# Patient Record
Sex: MALE | Race: WHITE | NOT HISPANIC OR LATINO | Employment: FULL TIME | URBAN - METROPOLITAN AREA
[De-identification: names, ages, dates, MRNs, and addresses within clinical notes are randomized per-mention and may not be internally consistent; named-entity substitution may affect disease eponyms.]

---

## 2019-10-29 ENCOUNTER — HOSPITAL ENCOUNTER (EMERGENCY)
Facility: HOSPITAL | Age: 34
Discharge: HOME | End: 2019-10-29
Attending: EMERGENCY MEDICINE | Admitting: EMERGENCY MEDICINE

## 2019-10-29 VITALS
WEIGHT: 155 LBS | DIASTOLIC BLOOD PRESSURE: 94 MMHG | RESPIRATION RATE: 16 BRPM | HEART RATE: 135 BPM | OXYGEN SATURATION: 96 % | HEIGHT: 69 IN | BODY MASS INDEX: 22.96 KG/M2 | TEMPERATURE: 97.8 F | SYSTOLIC BLOOD PRESSURE: 128 MMHG

## 2019-10-29 DIAGNOSIS — G56.31 SATURDAY NIGHT PARALYSIS OF RIGHT UPPER EXTREMITY: Primary | ICD-10-CM

## 2019-10-29 LAB
ALBUMIN SERPL-MCNC: 4.7 G/DL (ref 3.5–5.2)
ALBUMIN/GLOB SERPL: 1.6 G/DL
ALP SERPL-CCNC: 66 U/L (ref 39–117)
ALT SERPL W P-5'-P-CCNC: 27 U/L (ref 1–41)
ANION GAP SERPL CALCULATED.3IONS-SCNC: 13.9 MMOL/L (ref 5–15)
AST SERPL-CCNC: 24 U/L (ref 1–40)
BASOPHILS # BLD AUTO: 0.06 10*3/MM3 (ref 0–0.2)
BASOPHILS NFR BLD AUTO: 1.2 % (ref 0–1.5)
BILIRUB SERPL-MCNC: 0.7 MG/DL (ref 0.2–1.2)
BUN BLD-MCNC: 9 MG/DL (ref 6–20)
BUN/CREAT SERPL: 10 (ref 7–25)
CALCIUM SPEC-SCNC: 10.5 MG/DL (ref 8.6–10.5)
CHLORIDE SERPL-SCNC: 98 MMOL/L (ref 98–107)
CK SERPL-CCNC: 165 U/L (ref 20–200)
CO2 SERPL-SCNC: 26.1 MMOL/L (ref 22–29)
CREAT BLD-MCNC: 0.9 MG/DL (ref 0.76–1.27)
DEPRECATED RDW RBC AUTO: 47.2 FL (ref 37–54)
EOSINOPHIL # BLD AUTO: 0.05 10*3/MM3 (ref 0–0.4)
EOSINOPHIL NFR BLD AUTO: 1 % (ref 0.3–6.2)
ERYTHROCYTE [DISTWIDTH] IN BLOOD BY AUTOMATED COUNT: 13.9 % (ref 12.3–15.4)
GFR SERPL CREATININE-BSD FRML MDRD: 97 ML/MIN/1.73
GLOBULIN UR ELPH-MCNC: 3 GM/DL
GLUCOSE BLD-MCNC: 103 MG/DL (ref 65–99)
HCT VFR BLD AUTO: 45.2 % (ref 37.5–51)
HGB BLD-MCNC: 15.5 G/DL (ref 13–17.7)
IMM GRANULOCYTES # BLD AUTO: 0.02 10*3/MM3 (ref 0–0.05)
IMM GRANULOCYTES NFR BLD AUTO: 0.4 % (ref 0–0.5)
LYMPHOCYTES # BLD AUTO: 2.04 10*3/MM3 (ref 0.7–3.1)
LYMPHOCYTES NFR BLD AUTO: 39.5 % (ref 19.6–45.3)
MCH RBC QN AUTO: 31.6 PG (ref 26.6–33)
MCHC RBC AUTO-ENTMCNC: 34.3 G/DL (ref 31.5–35.7)
MCV RBC AUTO: 92.2 FL (ref 79–97)
MONOCYTES # BLD AUTO: 0.49 10*3/MM3 (ref 0.1–0.9)
MONOCYTES NFR BLD AUTO: 9.5 % (ref 5–12)
NEUTROPHILS # BLD AUTO: 2.51 10*3/MM3 (ref 1.7–7)
NEUTROPHILS NFR BLD AUTO: 48.4 % (ref 42.7–76)
NRBC BLD AUTO-RTO: 0 /100 WBC (ref 0–0.2)
PLATELET # BLD AUTO: 260 10*3/MM3 (ref 140–450)
PMV BLD AUTO: 9.2 FL (ref 6–12)
POTASSIUM BLD-SCNC: 4.1 MMOL/L (ref 3.5–5.2)
PROT SERPL-MCNC: 7.7 G/DL (ref 6–8.5)
RBC # BLD AUTO: 4.9 10*6/MM3 (ref 4.14–5.8)
SODIUM BLD-SCNC: 138 MMOL/L (ref 136–145)
WBC NRBC COR # BLD: 5.17 10*3/MM3 (ref 3.4–10.8)

## 2019-10-29 PROCEDURE — 82550 ASSAY OF CK (CPK): CPT | Performed by: EMERGENCY MEDICINE

## 2019-10-29 PROCEDURE — 99283 EMERGENCY DEPT VISIT LOW MDM: CPT

## 2019-10-29 PROCEDURE — 85025 COMPLETE CBC W/AUTO DIFF WBC: CPT | Performed by: EMERGENCY MEDICINE

## 2019-10-29 PROCEDURE — 80053 COMPREHEN METABOLIC PANEL: CPT | Performed by: EMERGENCY MEDICINE

## 2019-10-29 RX ORDER — MIRTAZAPINE 15 MG/1
15 TABLET, FILM COATED ORAL NIGHTLY
COMMUNITY

## 2020-07-14 ENCOUNTER — TELEPHONE (OUTPATIENT)
Dept: URGENT CARE | Facility: CLINIC | Age: 35
End: 2020-07-14

## 2020-07-14 NOTE — TELEPHONE ENCOUNTER
Pt advised of negative COVID results and current CDC quarantine guidelines.  Voices understanding.    ----- Message from Hernando Naidu MD sent at 7/14/2020  7:37 AM EDT -----  Neg test

## 2021-04-23 ENCOUNTER — HOSPITAL ENCOUNTER (EMERGENCY)
Facility: HOSPITAL | Age: 36
Discharge: HOME | End: 2021-04-23
Attending: EMERGENCY MEDICINE | Admitting: EMERGENCY MEDICINE

## 2021-04-23 VITALS
TEMPERATURE: 97.6 F | DIASTOLIC BLOOD PRESSURE: 92 MMHG | OXYGEN SATURATION: 97 % | HEIGHT: 69 IN | BODY MASS INDEX: 21.92 KG/M2 | WEIGHT: 148 LBS | SYSTOLIC BLOOD PRESSURE: 120 MMHG | RESPIRATION RATE: 18 BRPM | HEART RATE: 99 BPM

## 2021-04-23 DIAGNOSIS — R11.2 INTRACTABLE VOMITING WITH NAUSEA, UNSPECIFIED VOMITING TYPE: Primary | ICD-10-CM

## 2021-04-23 DIAGNOSIS — E87.6 HYPOKALEMIA: ICD-10-CM

## 2021-04-23 LAB
ALBUMIN SERPL-MCNC: 5 G/DL (ref 3.5–5.2)
ALBUMIN/GLOB SERPL: 1.8 G/DL
ALP SERPL-CCNC: 90 U/L (ref 39–117)
ALT SERPL W P-5'-P-CCNC: 19 U/L (ref 1–41)
ANION GAP SERPL CALCULATED.3IONS-SCNC: 17.1 MMOL/L (ref 5–15)
AST SERPL-CCNC: 17 U/L (ref 1–40)
BASOPHILS # BLD AUTO: 0.05 10*3/MM3 (ref 0–0.2)
BASOPHILS NFR BLD AUTO: 0.4 % (ref 0–1.5)
BILIRUB SERPL-MCNC: 1.2 MG/DL (ref 0–1.2)
BUN SERPL-MCNC: 9 MG/DL (ref 6–20)
BUN/CREAT SERPL: 10.7 (ref 7–25)
CALCIUM SPEC-SCNC: 9.6 MG/DL (ref 8.6–10.5)
CHLORIDE SERPL-SCNC: 103 MMOL/L (ref 98–107)
CO2 SERPL-SCNC: 24.9 MMOL/L (ref 22–29)
CREAT SERPL-MCNC: 0.84 MG/DL (ref 0.76–1.27)
DEPRECATED RDW RBC AUTO: 44.5 FL (ref 37–54)
EOSINOPHIL # BLD AUTO: 0.06 10*3/MM3 (ref 0–0.4)
EOSINOPHIL NFR BLD AUTO: 0.5 % (ref 0.3–6.2)
ERYTHROCYTE [DISTWIDTH] IN BLOOD BY AUTOMATED COUNT: 13.3 % (ref 12.3–15.4)
GFR SERPL CREATININE-BSD FRML MDRD: 105 ML/MIN/1.73
GLOBULIN UR ELPH-MCNC: 2.8 GM/DL
GLUCOSE SERPL-MCNC: 72 MG/DL (ref 65–99)
HCT VFR BLD AUTO: 50 % (ref 37.5–51)
HGB BLD-MCNC: 17.1 G/DL (ref 13–17.7)
IMM GRANULOCYTES # BLD AUTO: 0.1 10*3/MM3 (ref 0–0.05)
IMM GRANULOCYTES NFR BLD AUTO: 0.8 % (ref 0–0.5)
LYMPHOCYTES # BLD AUTO: 1.42 10*3/MM3 (ref 0.7–3.1)
LYMPHOCYTES NFR BLD AUTO: 10.9 % (ref 19.6–45.3)
MAGNESIUM SERPL-MCNC: 2 MG/DL (ref 1.6–2.6)
MCH RBC QN AUTO: 31.6 PG (ref 26.6–33)
MCHC RBC AUTO-ENTMCNC: 34.2 G/DL (ref 31.5–35.7)
MCV RBC AUTO: 92.4 FL (ref 79–97)
MONOCYTES # BLD AUTO: 0.5 10*3/MM3 (ref 0.1–0.9)
MONOCYTES NFR BLD AUTO: 3.9 % (ref 5–12)
NEUTROPHILS NFR BLD AUTO: 10.85 10*3/MM3 (ref 1.7–7)
NEUTROPHILS NFR BLD AUTO: 83.5 % (ref 42.7–76)
NRBC BLD AUTO-RTO: 0 /100 WBC (ref 0–0.2)
PLATELET # BLD AUTO: 249 10*3/MM3 (ref 140–450)
PMV BLD AUTO: 9.7 FL (ref 6–12)
POTASSIUM SERPL-SCNC: 3.3 MMOL/L (ref 3.5–5.2)
PROT SERPL-MCNC: 7.8 G/DL (ref 6–8.5)
RBC # BLD AUTO: 5.41 10*6/MM3 (ref 4.14–5.8)
SODIUM SERPL-SCNC: 145 MMOL/L (ref 136–145)
WBC # BLD AUTO: 12.98 10*3/MM3 (ref 3.4–10.8)

## 2021-04-23 PROCEDURE — 99283 EMERGENCY DEPT VISIT LOW MDM: CPT

## 2021-04-23 PROCEDURE — 85025 COMPLETE CBC W/AUTO DIFF WBC: CPT | Performed by: EMERGENCY MEDICINE

## 2021-04-23 PROCEDURE — 80053 COMPREHEN METABOLIC PANEL: CPT | Performed by: EMERGENCY MEDICINE

## 2021-04-23 PROCEDURE — 96374 THER/PROPH/DIAG INJ IV PUSH: CPT

## 2021-04-23 PROCEDURE — 83735 ASSAY OF MAGNESIUM: CPT | Performed by: EMERGENCY MEDICINE

## 2021-04-23 PROCEDURE — 25010000002 DIPHENHYDRAMINE PER 50 MG: Performed by: EMERGENCY MEDICINE

## 2021-04-23 RX ORDER — ONDANSETRON 4 MG/1
4 TABLET, ORALLY DISINTEGRATING ORAL 4 TIMES DAILY PRN
Qty: 12 TABLET | Refills: 0 | Status: SHIPPED | OUTPATIENT
Start: 2021-04-23 | End: 2022-02-09 | Stop reason: SDUPTHER

## 2021-04-23 RX ORDER — PRAMOXINE HYDROCHLORIDE 10 MG/G
AEROSOL, FOAM TOPICAL
COMMUNITY

## 2021-04-23 RX ORDER — SODIUM CHLORIDE 0.9 % (FLUSH) 0.9 %
10 SYRINGE (ML) INJECTION AS NEEDED
Status: DISCONTINUED | OUTPATIENT
Start: 2021-04-23 | End: 2021-04-23 | Stop reason: HOSPADM

## 2021-04-23 RX ORDER — DIPHENHYDRAMINE HYDROCHLORIDE 50 MG/ML
25 INJECTION INTRAMUSCULAR; INTRAVENOUS ONCE
Status: COMPLETED | OUTPATIENT
Start: 2021-04-23 | End: 2021-04-23

## 2021-04-23 RX ORDER — POTASSIUM CHLORIDE 750 MG/1
40 TABLET, FILM COATED, EXTENDED RELEASE ORAL AS NEEDED
Status: DISCONTINUED | OUTPATIENT
Start: 2021-04-23 | End: 2021-04-23 | Stop reason: HOSPADM

## 2021-04-23 RX ORDER — POTASSIUM CHLORIDE 20 MEQ/1
20 TABLET, EXTENDED RELEASE ORAL DAILY
Qty: 3 TABLET | Refills: 0 | Status: ON HOLD | OUTPATIENT
Start: 2021-04-23 | End: 2022-04-25

## 2021-04-23 RX ORDER — CYCLOBENZAPRINE HCL 10 MG
10 TABLET ORAL 3 TIMES DAILY PRN
COMMUNITY

## 2021-04-23 RX ADMIN — POTASSIUM CHLORIDE 40 MEQ: 750 TABLET, EXTENDED RELEASE ORAL at 19:26

## 2021-04-23 RX ADMIN — SODIUM CHLORIDE, POTASSIUM CHLORIDE, SODIUM LACTATE AND CALCIUM CHLORIDE 1000 ML: 600; 310; 30; 20 INJECTION, SOLUTION INTRAVENOUS at 18:21

## 2021-04-23 RX ADMIN — DIPHENHYDRAMINE HYDROCHLORIDE 25 MG: 50 INJECTION, SOLUTION INTRAMUSCULAR; INTRAVENOUS at 18:23

## 2021-04-23 NOTE — ED TRIAGE NOTES
Pt had COVID vaccine 2 days ago states today vomiting, diaphoretic. Pt aving jerking movements in triage.     Patient masked in first look triage. I was wearing mask and goggles.

## 2021-04-23 NOTE — ED PROVIDER NOTES
EMERGENCY DEPARTMENT ENCOUNTER    Room Number:  34/34  Date of encounter:  4/23/2021  PCP: System, Provider Not In  Patient Care Team:  System, Provider Not In as PCP - General   Historian: Patient    HPI:  Chief Complaint: Vomiting, twitching      Context: Mane Gunderson is a 35 y.o. male who presents to the ED c/o vomiting and twitching.  He reports that 2 days ago he received his Pfizer shot.  Yesterday about 24 hours later he started vomiting.  He reports he vomited greater than 10 times.  He reports 6 hours after that he started having twitching and somewhat involuntary movements.  He went to urgent care today who gave him Zofran ODT which seemed to help the nausea.  He reports he has only vomited twice since then.  He reports that the twitching continues.  He denies prior similar episodes.  He denies abdominal pain.    Prior record review: ER visit 10/29/2019 for Saturday night palsy.    PAST MEDICAL HISTORY  Active Ambulatory Problems     Diagnosis Date Noted   • Appendicitis 08/10/2016   • Ruptured appendicitis 08/14/2016     Resolved Ambulatory Problems     Diagnosis Date Noted   • No Resolved Ambulatory Problems     Past Medical History:   Diagnosis Date   • ADD (attention deficit disorder)    • Dyslexia    • Pancreatitis    • Posttraumatic stress disorder          PAST SURGICAL HISTORY  Past Surgical History:   Procedure Laterality Date   • APPENDECTOMY N/A 8/10/2016    Procedure: APPENDECTOMY LAPAROSCOPIC;  Surgeon: Bird Vazquez Jr., MD;  Location: [facility];  Service:          FAMILY HISTORY  History reviewed. No pertinent family history.      SOCIAL HISTORY  Social History     Socioeconomic History   • Marital status: Single     Spouse name: Not on file   • Number of children: Not on file   • Years of education: Not on file   • Highest education level: Not on file   Tobacco Use   • Smoking status: Current Every Day Smoker     Packs/day: 1.00     Types: Electronic Cigarette   • Smokeless  tobacco: Never Used   Vaping Use   • Vaping Use: Every day   Substance and Sexual Activity   • Alcohol use: Yes   • Drug use: No   • Sexual activity: Defer         ALLERGIES  Haldol [haloperidol lactate] and Codeine        REVIEW OF SYSTEMS  Review of Systems   No abdominal pain, no diarrhea, no constipation, positive nausea, positive vomiting, negative shortness of breath, negative focal weakness or numbness  All systems reviewed and negative except for those discussed in HPI.       PHYSICAL EXAM    I have reviewed the triage vital signs and nursing notes.    ED Triage Vitals [04/23/21 1748]   Temp Heart Rate Resp BP SpO2   97.6 °F (36.4 °C) 99 18 125/82 97 %      Temp src Heart Rate Source Patient Position BP Location FiO2 (%)   Tympanic -- -- -- --       Physical Exam  GENERAL: Awake, alert, no acute distress  SKIN: Warm, dry  HENT: Normocephalic, atraumatic  EYES: no scleral icterus  CV: regular rhythm, regular rate  RESPIRATORY: normal effort, lungs clear  ABDOMEN: soft, nontender, nondistended  MUSCULOSKELETAL: no deformity, some involuntary twitching of the upper extremities and head.  No twitching of the cheek with tapping on the cheek.  NEURO: alert, moves all extremities, follows commands          LAB RESULTS  Recent Results (from the past 24 hour(s))   Comprehensive Metabolic Panel    Collection Time: 04/23/21  6:21 PM    Specimen: Blood   Result Value Ref Range    Glucose 72 65 - 99 mg/dL    BUN 9 6 - 20 mg/dL    Creatinine 0.84 0.76 - 1.27 mg/dL    Sodium 145 136 - 145 mmol/L    Potassium 3.3 (L) 3.5 - 5.2 mmol/L    Chloride 103 98 - 107 mmol/L    CO2 24.9 22.0 - 29.0 mmol/L    Calcium 9.6 8.6 - 10.5 mg/dL    Total Protein 7.8 6.0 - 8.5 g/dL    Albumin 5.00 3.50 - 5.20 g/dL    ALT (SGPT) 19 1 - 41 U/L    AST (SGOT) 17 1 - 40 U/L    Alkaline Phosphatase 90 39 - 117 U/L    Total Bilirubin 1.2 0.0 - 1.2 mg/dL    eGFR Non African Amer 105 >60 mL/min/1.73    Globulin 2.8 gm/dL    A/G Ratio 1.8 g/dL     BUN/Creatinine Ratio 10.7 7.0 - 25.0    Anion Gap 17.1 (H) 5.0 - 15.0 mmol/L   Magnesium    Collection Time: 04/23/21  6:21 PM    Specimen: Blood   Result Value Ref Range    Magnesium 2.0 1.6 - 2.6 mg/dL   CBC Auto Differential    Collection Time: 04/23/21  6:21 PM    Specimen: Blood   Result Value Ref Range    WBC 12.98 (H) 3.40 - 10.80 10*3/mm3    RBC 5.41 4.14 - 5.80 10*6/mm3    Hemoglobin 17.1 13.0 - 17.7 g/dL    Hematocrit 50.0 37.5 - 51.0 %    MCV 92.4 79.0 - 97.0 fL    MCH 31.6 26.6 - 33.0 pg    MCHC 34.2 31.5 - 35.7 g/dL    RDW 13.3 12.3 - 15.4 %    RDW-SD 44.5 37.0 - 54.0 fl    MPV 9.7 6.0 - 12.0 fL    Platelets 249 140 - 450 10*3/mm3    Neutrophil % 83.5 (H) 42.7 - 76.0 %    Lymphocyte % 10.9 (L) 19.6 - 45.3 %    Monocyte % 3.9 (L) 5.0 - 12.0 %    Eosinophil % 0.5 0.3 - 6.2 %    Basophil % 0.4 0.0 - 1.5 %    Immature Grans % 0.8 (H) 0.0 - 0.5 %    Neutrophils, Absolute 10.85 (H) 1.70 - 7.00 10*3/mm3    Lymphocytes, Absolute 1.42 0.70 - 3.10 10*3/mm3    Monocytes, Absolute 0.50 0.10 - 0.90 10*3/mm3    Eosinophils, Absolute 0.06 0.00 - 0.40 10*3/mm3    Basophils, Absolute 0.05 0.00 - 0.20 10*3/mm3    Immature Grans, Absolute 0.10 (H) 0.00 - 0.05 10*3/mm3    nRBC 0.0 0.0 - 0.2 /100 WBC       Ordered the above labs and independently reviewed the results.        RADIOLOGY  No Radiology Exams Resulted Within Past 24 Hours    I ordered the above noted radiological studies. Reviewed by me and discussed with radiologist.  See dictation for official radiology interpretation.      PROCEDURES    Procedures      MEDICATIONS GIVEN IN ER    Medications   sodium chloride 0.9 % flush 10 mL (has no administration in time range)   potassium chloride (K-DUR,KLOR-CON) ER tablet 40 mEq (40 mEq Oral Given 4/23/21 1926)   lactated ringers bolus 1,000 mL (1,000 mL Intravenous New Bag 4/23/21 1821)   diphenhydrAMINE (BENADRYL) injection 25 mg (25 mg Intravenous Given 4/23/21 1823)         PROGRESS, DATA ANALYSIS, CONSULTS, AND  MEDICAL DECISION MAKING    All labs have been independently reviewed by me.  All radiology studies have been reviewed by me and discussed with radiologist dictating the report.   EKG's independently viewed and interpreted by me.  Discussion below represents my analysis of pertinent findings related to patient's condition, differential diagnosis, treatment plan and final disposition.        ED Course as of Apr 23 1927 Fri Apr 23, 2021 1816 Plan to check his electrolytes, provide IV hydration.  Giving IV Benadryl in case this is a vaccine reaction.    [TR]   1916 Potassium slightly low.   Potassium(!): 3.3 [TR]   1920 He reports his nausea and vomiting has resolved.  Ordering potassium to replace his low potassium.  His twitching has improved significantly.  He is asking for oral fluids.    [TR]      ED Course User Index  [TR] Jacoby Leahy MD           PPE: Both the patient and I wore a surgical mask throughout the entire patient encounter. I wore protective goggles.       AS OF 19:27 EDT VITALS:    BP - 120/92  HR - 99  TEMP - 97.6 °F (36.4 °C) (Tympanic)  O2 SATS - 97%        DIAGNOSIS  Final diagnoses:   Intractable vomiting with nausea, unspecified vomiting type   Hypokalemia         DISPOSITION  ED Disposition     ED Disposition Condition Comment    Discharge Stable                 Jacoby Leahy MD  04/23/21 1927

## 2021-04-23 NOTE — ED NOTES
Patient was placed in face mask in first look.  Patient was wearing a face mask throughout our encounter.  I wore protective eye protection throughout the encounter.  Hand hygiene was performed before and after patient encounter.        Thuy Coronel RN  04/23/21 5027

## 2021-04-24 NOTE — ED NOTES
Pt ambulatory c steady gait, AAOx4, ABC's intact, NAD noted at this time. Pt discharged c belongings and educational packet. PPE worn by this RN c pt wearing mask during encounters.      Reid Chen, DANIELLA  04/23/21 2000

## 2021-09-10 ENCOUNTER — APPOINTMENT (OUTPATIENT)
Dept: CT IMAGING | Facility: HOSPITAL | Age: 36
End: 2021-09-10

## 2021-09-10 ENCOUNTER — HOSPITAL ENCOUNTER (EMERGENCY)
Facility: HOSPITAL | Age: 36
Discharge: HOME | End: 2021-09-10
Attending: EMERGENCY MEDICINE | Admitting: EMERGENCY MEDICINE

## 2021-09-10 VITALS
HEIGHT: 69 IN | RESPIRATION RATE: 16 BRPM | WEIGHT: 148 LBS | SYSTOLIC BLOOD PRESSURE: 134 MMHG | BODY MASS INDEX: 21.92 KG/M2 | OXYGEN SATURATION: 97 % | DIASTOLIC BLOOD PRESSURE: 91 MMHG | HEART RATE: 95 BPM | TEMPERATURE: 98.4 F

## 2021-09-10 DIAGNOSIS — K85.20 ALCOHOL-INDUCED ACUTE PANCREATITIS, UNSPECIFIED COMPLICATION STATUS: ICD-10-CM

## 2021-09-10 DIAGNOSIS — F10.10 ALCOHOL ABUSE: Primary | ICD-10-CM

## 2021-09-10 LAB
ALBUMIN SERPL-MCNC: 5.8 G/DL (ref 3.5–5.2)
ALBUMIN/GLOB SERPL: 2.1 G/DL
ALP SERPL-CCNC: 82 U/L (ref 39–117)
ALT SERPL W P-5'-P-CCNC: 43 U/L (ref 1–41)
ANION GAP SERPL CALCULATED.3IONS-SCNC: 14.2 MMOL/L (ref 5–15)
AST SERPL-CCNC: 46 U/L (ref 1–40)
BASOPHILS # BLD AUTO: 0.08 10*3/MM3 (ref 0–0.2)
BASOPHILS NFR BLD AUTO: 0.9 % (ref 0–1.5)
BILIRUB SERPL-MCNC: 1.7 MG/DL (ref 0–1.2)
BILIRUB UR QL STRIP: NEGATIVE
BUN SERPL-MCNC: 9 MG/DL (ref 6–20)
BUN/CREAT SERPL: 10.3 (ref 7–25)
CALCIUM SPEC-SCNC: 10.2 MG/DL (ref 8.6–10.5)
CHLORIDE SERPL-SCNC: 97 MMOL/L (ref 98–107)
CLARITY UR: CLEAR
CO2 SERPL-SCNC: 24.8 MMOL/L (ref 22–29)
COLOR UR: YELLOW
CREAT SERPL-MCNC: 0.87 MG/DL (ref 0.76–1.27)
DEPRECATED RDW RBC AUTO: 45.8 FL (ref 37–54)
EOSINOPHIL # BLD AUTO: 0.32 10*3/MM3 (ref 0–0.4)
EOSINOPHIL NFR BLD AUTO: 3.7 % (ref 0.3–6.2)
ERYTHROCYTE [DISTWIDTH] IN BLOOD BY AUTOMATED COUNT: 13.4 % (ref 12.3–15.4)
GFR SERPL CREATININE-BSD FRML MDRD: 100 ML/MIN/1.73
GLOBULIN UR ELPH-MCNC: 2.8 GM/DL
GLUCOSE SERPL-MCNC: 119 MG/DL (ref 65–99)
GLUCOSE UR STRIP-MCNC: NEGATIVE MG/DL
HCT VFR BLD AUTO: 47.7 % (ref 37.5–51)
HGB BLD-MCNC: 16.6 G/DL (ref 13–17.7)
HGB UR QL STRIP.AUTO: NEGATIVE
HOLD SPECIMEN: NORMAL
HOLD SPECIMEN: NORMAL
IMM GRANULOCYTES # BLD AUTO: 0.04 10*3/MM3 (ref 0–0.05)
IMM GRANULOCYTES NFR BLD AUTO: 0.5 % (ref 0–0.5)
INR PPP: 0.96 (ref 0.9–1.1)
KETONES UR QL STRIP: NEGATIVE
LEUKOCYTE ESTERASE UR QL STRIP.AUTO: NEGATIVE
LIPASE SERPL-CCNC: 136 U/L (ref 13–60)
LYMPHOCYTES # BLD AUTO: 2.6 10*3/MM3 (ref 0.7–3.1)
LYMPHOCYTES NFR BLD AUTO: 29.7 % (ref 19.6–45.3)
MAGNESIUM SERPL-MCNC: 2.2 MG/DL (ref 1.6–2.6)
MCH RBC QN AUTO: 32 PG (ref 26.6–33)
MCHC RBC AUTO-ENTMCNC: 34.8 G/DL (ref 31.5–35.7)
MCV RBC AUTO: 92.1 FL (ref 79–97)
MONOCYTES # BLD AUTO: 0.69 10*3/MM3 (ref 0.1–0.9)
MONOCYTES NFR BLD AUTO: 7.9 % (ref 5–12)
NEUTROPHILS NFR BLD AUTO: 5.02 10*3/MM3 (ref 1.7–7)
NEUTROPHILS NFR BLD AUTO: 57.3 % (ref 42.7–76)
NITRITE UR QL STRIP: NEGATIVE
NRBC BLD AUTO-RTO: 0 /100 WBC (ref 0–0.2)
PH UR STRIP.AUTO: 6.5 [PH] (ref 5–8)
PLATELET # BLD AUTO: 251 10*3/MM3 (ref 140–450)
PMV BLD AUTO: 9.5 FL (ref 6–12)
POTASSIUM SERPL-SCNC: 3.4 MMOL/L (ref 3.5–5.2)
PROT SERPL-MCNC: 8.6 G/DL (ref 6–8.5)
PROT UR QL STRIP: NEGATIVE
PROTHROMBIN TIME: 12.6 SECONDS (ref 11.7–14.2)
RBC # BLD AUTO: 5.18 10*6/MM3 (ref 4.14–5.8)
SARS-COV-2 ORF1AB RESP QL NAA+PROBE: NOT DETECTED
SODIUM SERPL-SCNC: 136 MMOL/L (ref 136–145)
SP GR UR STRIP: <=1.005 (ref 1–1.03)
UROBILINOGEN UR QL STRIP: NORMAL
WBC # BLD AUTO: 8.75 10*3/MM3 (ref 3.4–10.8)
WHOLE BLOOD HOLD SPECIMEN: NORMAL
WHOLE BLOOD HOLD SPECIMEN: NORMAL

## 2021-09-10 PROCEDURE — 96374 THER/PROPH/DIAG INJ IV PUSH: CPT

## 2021-09-10 PROCEDURE — 25010000002 HYDROMORPHONE PER 4 MG: Performed by: EMERGENCY MEDICINE

## 2021-09-10 PROCEDURE — 25010000002 IOPAMIDOL 61 % SOLUTION: Performed by: EMERGENCY MEDICINE

## 2021-09-10 PROCEDURE — 83690 ASSAY OF LIPASE: CPT | Performed by: EMERGENCY MEDICINE

## 2021-09-10 PROCEDURE — 25010000002 ONDANSETRON PER 1 MG: Performed by: EMERGENCY MEDICINE

## 2021-09-10 PROCEDURE — 85025 COMPLETE CBC W/AUTO DIFF WBC: CPT | Performed by: NURSE PRACTITIONER

## 2021-09-10 PROCEDURE — 83735 ASSAY OF MAGNESIUM: CPT | Performed by: EMERGENCY MEDICINE

## 2021-09-10 PROCEDURE — 81003 URINALYSIS AUTO W/O SCOPE: CPT

## 2021-09-10 PROCEDURE — 99283 EMERGENCY DEPT VISIT LOW MDM: CPT

## 2021-09-10 PROCEDURE — U0004 COV-19 TEST NON-CDC HGH THRU: HCPCS | Performed by: NURSE PRACTITIONER

## 2021-09-10 PROCEDURE — 74177 CT ABD & PELVIS W/CONTRAST: CPT

## 2021-09-10 PROCEDURE — 85610 PROTHROMBIN TIME: CPT | Performed by: NURSE PRACTITIONER

## 2021-09-10 PROCEDURE — 96375 TX/PRO/DX INJ NEW DRUG ADDON: CPT

## 2021-09-10 PROCEDURE — 80053 COMPREHEN METABOLIC PANEL: CPT | Performed by: EMERGENCY MEDICINE

## 2021-09-10 PROCEDURE — C9803 HOPD COVID-19 SPEC COLLECT: HCPCS | Performed by: NURSE PRACTITIONER

## 2021-09-10 RX ORDER — HYDROCODONE BITARTRATE AND ACETAMINOPHEN 5; 325 MG/1; MG/1
1 TABLET ORAL EVERY 6 HOURS PRN
Qty: 10 TABLET | Refills: 0 | Status: SHIPPED | OUTPATIENT
Start: 2021-09-10 | End: 2022-02-09 | Stop reason: SDUPTHER

## 2021-09-10 RX ORDER — ONDANSETRON 2 MG/ML
4 INJECTION INTRAMUSCULAR; INTRAVENOUS ONCE
Status: COMPLETED | OUTPATIENT
Start: 2021-09-10 | End: 2021-09-10

## 2021-09-10 RX ORDER — HYDROMORPHONE HYDROCHLORIDE 1 MG/ML
0.5 INJECTION, SOLUTION INTRAMUSCULAR; INTRAVENOUS; SUBCUTANEOUS ONCE
Status: COMPLETED | OUTPATIENT
Start: 2021-09-10 | End: 2021-09-10

## 2021-09-10 RX ADMIN — ONDANSETRON 4 MG: 2 INJECTION INTRAMUSCULAR; INTRAVENOUS at 12:22

## 2021-09-10 RX ADMIN — IOPAMIDOL 85 ML: 612 INJECTION, SOLUTION INTRAVENOUS at 12:14

## 2021-09-10 RX ADMIN — HYDROMORPHONE HYDROCHLORIDE 0.5 MG: 1 INJECTION, SOLUTION INTRAMUSCULAR; INTRAVENOUS; SUBCUTANEOUS at 12:23

## 2021-09-10 NOTE — ED NOTES
Epigastric abd pain with Nausea and diarrhea  x4 days.   Hx of pancreatitis. Pt admits to ETOH use. Last drink 2 days ago.      Verónica Hughes, RN  09/10/21 6437

## 2021-09-10 NOTE — ED PROVIDER NOTES
EMERGENCY DEPARTMENT ENCOUNTER    Room Number:  18/18  PCP: Dominique Ontiveros APRN  Historian: Patient  History Limited By: Nothing      HPI  Chief Complaint: Abdominal pain  Context: Mane Gunderson is a 36 y.o. male who presents to the ED c/o abdominal pain.  Patient has history of alcohol abuse.  States he has been drinking for the last 3 weeks.  States his last drink was over 48 hours ago.  Patient states has been having increasing abdominal pain since then.  Pain is worse with eating and drinking.  Patient states he has had a history of pancreatitis 5 years ago and this feels similar.  Patient also has a history of appendicitis.  Patient has had 1 episode of vomiting and multiple episodes of diarrhea.  States he has been able to keep down ice chips today.  Has had no black stools or dark tarry stools.  Has had problems with withdrawal 5 years ago but states he is not having any withdrawal symptoms at this point.      Location: Epigastric  Radiation: None  Character: Burning  Duration: 4 to 5 days  Severity: Moderate  Progression: Not improving  Aggravating Factors: Eating and drinking  Alleviating Factors: N.p.o.        MEDICAL RECORD REVIEW    Patient last seen here April 2021 for intractable vomiting          PAST MEDICAL HISTORY  Active Ambulatory Problems     Diagnosis Date Noted   • Appendicitis 08/10/2016   • Ruptured appendicitis 08/14/2016     Resolved Ambulatory Problems     Diagnosis Date Noted   • No Resolved Ambulatory Problems     Past Medical History:   Diagnosis Date   • ADD (attention deficit disorder)    • Alcohol abuse    • Dyslexia    • Pancreatitis    • Posttraumatic stress disorder          PAST SURGICAL HISTORY  Past Surgical History:   Procedure Laterality Date   • APPENDECTOMY N/A 8/10/2016    Procedure: APPENDECTOMY LAPAROSCOPIC;  Surgeon: Bird Vazquez Jr., MD;  Location: [facility] OR;  Service:          FAMILY HISTORY  History reviewed. No pertinent family history.      SOCIAL  HISTORY  Social History     Socioeconomic History   • Marital status: Single     Spouse name: Not on file   • Number of children: Not on file   • Years of education: Not on file   • Highest education level: Not on file   Tobacco Use   • Smoking status: Current Every Day Smoker     Packs/day: 1.00     Types: Electronic Cigarette   • Smokeless tobacco: Never Used   Vaping Use   • Vaping Use: Every day   Substance and Sexual Activity   • Alcohol use: Yes     Comment: ONE PINT DAILY   • Drug use: No   • Sexual activity: Defer         ALLERGIES  Haldol [haloperidol lactate] and Codeine        REVIEW OF SYSTEMS  Review of Systems   Constitutional: Negative for activity change, appetite change and fever.   HENT: Negative for congestion and sore throat.    Eyes: Negative.    Respiratory: Negative for cough and shortness of breath.    Cardiovascular: Negative for chest pain and leg swelling.   Gastrointestinal: Positive for abdominal pain, nausea and vomiting. Negative for diarrhea.   Endocrine: Negative.    Genitourinary: Negative for decreased urine volume and dysuria.   Musculoskeletal: Negative for neck pain.   Skin: Negative for rash and wound.   Allergic/Immunologic: Negative.    Neurological: Negative for weakness, numbness and headaches.   Hematological: Negative.    Psychiatric/Behavioral: The patient is nervous/anxious.    All other systems reviewed and are negative.           PHYSICAL EXAM  ED Triage Vitals   Temp Heart Rate Resp BP SpO2   09/10/21 1026 09/10/21 1026 09/10/21 1026 09/10/21 1053 09/10/21 1026   98.4 °F (36.9 °C) 111 18 107/49 98 %      Temp src Heart Rate Source Patient Position BP Location FiO2 (%)   -- -- -- -- --              Physical Exam  Vitals and nursing note reviewed.   Constitutional:       General: He is not in acute distress.  HENT:      Head: Normocephalic and atraumatic.   Eyes:      Pupils: Pupils are equal, round, and reactive to light.   Cardiovascular:      Rate and Rhythm: Normal  rate and regular rhythm.      Heart sounds: Normal heart sounds.   Pulmonary:      Effort: Pulmonary effort is normal. No respiratory distress.      Breath sounds: Normal breath sounds.   Abdominal:      Palpations: Abdomen is soft.      Tenderness: There is abdominal tenderness in the epigastric area. There is no guarding or rebound.   Musculoskeletal:         General: Normal range of motion.      Cervical back: Normal range of motion and neck supple.   Skin:     General: Skin is warm and dry.   Neurological:      Mental Status: He is alert and oriented to person, place, and time.      Sensory: Sensation is intact.      Motor: No weakness.   Psychiatric:         Mood and Affect: Mood and affect normal.       Patient was wearing a face mask when I entered the room and they continued to wear a mask throughout their stay in the ED.  I wore PPE, including  gloves, face mask with shield or face mask with goggles whenever I was in the room with patient.       LAB RESULTS  Recent Results (from the past 24 hour(s))   Green Top (Gel)    Collection Time: 09/10/21 10:52 AM   Result Value Ref Range    Extra Tube Hold for add-ons.    Lavender Top    Collection Time: 09/10/21 10:52 AM   Result Value Ref Range    Extra Tube hold for add-on    Gold Top - SST    Collection Time: 09/10/21 10:52 AM   Result Value Ref Range    Extra Tube Hold for add-ons.    Light Blue Top    Collection Time: 09/10/21 10:52 AM   Result Value Ref Range    Extra Tube hold for add-on    Protime-INR    Collection Time: 09/10/21 10:52 AM    Specimen: Blood   Result Value Ref Range    Protime 12.6 11.7 - 14.2 Seconds    INR 0.96 0.90 - 1.10   CBC Auto Differential    Collection Time: 09/10/21 10:52 AM    Specimen: Blood   Result Value Ref Range    WBC 8.75 3.40 - 10.80 10*3/mm3    RBC 5.18 4.14 - 5.80 10*6/mm3    Hemoglobin 16.6 13.0 - 17.7 g/dL    Hematocrit 47.7 37.5 - 51.0 %    MCV 92.1 79.0 - 97.0 fL    MCH 32.0 26.6 - 33.0 pg    MCHC 34.8 31.5 - 35.7  g/dL    RDW 13.4 12.3 - 15.4 %    RDW-SD 45.8 37.0 - 54.0 fl    MPV 9.5 6.0 - 12.0 fL    Platelets 251 140 - 450 10*3/mm3    Neutrophil % 57.3 42.7 - 76.0 %    Lymphocyte % 29.7 19.6 - 45.3 %    Monocyte % 7.9 5.0 - 12.0 %    Eosinophil % 3.7 0.3 - 6.2 %    Basophil % 0.9 0.0 - 1.5 %    Immature Grans % 0.5 0.0 - 0.5 %    Neutrophils, Absolute 5.02 1.70 - 7.00 10*3/mm3    Lymphocytes, Absolute 2.60 0.70 - 3.10 10*3/mm3    Monocytes, Absolute 0.69 0.10 - 0.90 10*3/mm3    Eosinophils, Absolute 0.32 0.00 - 0.40 10*3/mm3    Basophils, Absolute 0.08 0.00 - 0.20 10*3/mm3    Immature Grans, Absolute 0.04 0.00 - 0.05 10*3/mm3    nRBC 0.0 0.0 - 0.2 /100 WBC   Comprehensive Metabolic Panel    Collection Time: 09/10/21 10:52 AM    Specimen: Blood   Result Value Ref Range    Glucose 119 (H) 65 - 99 mg/dL    BUN 9 6 - 20 mg/dL    Creatinine 0.87 0.76 - 1.27 mg/dL    Sodium 136 136 - 145 mmol/L    Potassium 3.4 (L) 3.5 - 5.2 mmol/L    Chloride 97 (L) 98 - 107 mmol/L    CO2 24.8 22.0 - 29.0 mmol/L    Calcium 10.2 8.6 - 10.5 mg/dL    Total Protein 8.6 (H) 6.0 - 8.5 g/dL    Albumin 5.80 (H) 3.50 - 5.20 g/dL    ALT (SGPT) 43 (H) 1 - 41 U/L    AST (SGOT) 46 (H) 1 - 40 U/L    Alkaline Phosphatase 82 39 - 117 U/L    Total Bilirubin 1.7 (H) 0.0 - 1.2 mg/dL    eGFR Non African Amer 100 >60 mL/min/1.73    Globulin 2.8 gm/dL    A/G Ratio 2.1 g/dL    BUN/Creatinine Ratio 10.3 7.0 - 25.0    Anion Gap 14.2 5.0 - 15.0 mmol/L   Lipase    Collection Time: 09/10/21 10:52 AM    Specimen: Blood   Result Value Ref Range    Lipase 136 (H) 13 - 60 U/L   Magnesium    Collection Time: 09/10/21 10:52 AM    Specimen: Blood   Result Value Ref Range    Magnesium 2.2 1.6 - 2.6 mg/dL   Urinalysis With Culture If Indicated - Urine, Clean Catch    Collection Time: 09/10/21 10:56 AM    Specimen: Urine, Clean Catch   Result Value Ref Range    Color, UA Yellow Yellow, Straw    Appearance, UA Clear Clear    pH, UA 6.5 5.0 - 8.0    Specific Gravity, UA <=1.005 1.005  - 1.030    Glucose, UA Negative Negative    Ketones, UA Negative Negative    Bilirubin, UA Negative Negative    Blood, UA Negative Negative    Protein, UA Negative Negative    Leuk Esterase, UA Negative Negative    Nitrite, UA Negative Negative    Urobilinogen, UA 0.2 E.U./dL 0.2 - 1.0 E.U./dL       Ordered the above labs and reviewed the results.        RADIOLOGY  CT Abdomen Pelvis With Contrast   Preliminary Result   1. There is likely mild acute pancreatitis. There is no peripancreatic   fluid and there are no vascular complications of pancreatitis.   2. Significantly distended urinary bladder may be of no clinical   significance. Please correlate clinically for possible cystitis.       Discussed with Dr. Jeronimo.               Ordered the above noted radiological studies. Reviewed by me in PACS. Discussed with Dr. Mathew (radiologist) regarding CT/MRI scan results.          PROCEDURES  Procedures            MEDICATIONS GIVEN IN ER  Medications   HYDROmorphone (DILAUDID) injection 0.5 mg (0.5 mg Intravenous Given 9/10/21 1223)   ondansetron (ZOFRAN) injection 4 mg (4 mg Intravenous Given 9/10/21 1222)   iopamidol (ISOVUE-300) 61 % injection 100 mL (85 mL Intravenous Given by Other 9/10/21 1214)             PROGRESS AND CONSULTS  ED Course as of Sep 10 1438   Fri Sep 10, 2021   1346 13:46 EDT  Patient presents for upper abdominal pain and does appear to have mild pancreatitis.  Patient is greater than 48 hours from his last drink.  States he does not feel like he is withdrawing.  Does have some mild liver enzyme elevations.  Patient given pain medication here.  Discussed options with patient.  Patient would like to be discharged home.  Will be given small amount of pain medication nausea medication for home.  Patient is clear liquids and advance diet slowly.  Instructed that I will be here tomorrow and he is to return if symptoms worsen.    [SL]      ED Course User Index  [SL] Saurabh Jeronimo MD            MEDICAL DECISION MAKING      MDM  Number of Diagnoses or Management Options     Amount and/or Complexity of Data Reviewed  Clinical lab tests: reviewed and ordered (Mild elevation of LFTs and lipase)  Tests in the radiology section of CPT®: reviewed and ordered (CT with mild pancreatitis)  Decide to obtain previous medical records or to obtain history from someone other than the patient: yes               DIAGNOSIS  Final diagnoses:   Alcohol abuse   Alcohol-induced acute pancreatitis, unspecified complication status           DISPOSITION  DISCHARGE    Patient discharged in stable condition.    Reviewed implications of results, diagnosis, meds, responsibility to follow up, warning signs and symptoms of possible worsening, potential complications and reasons to return to ER, including worsening pain.    Patient/Family voiced understanding of above instructions.    Discussed plan for discharge, as there is no emergent indication for admission. Patient referred to primary care provider for BP management due to today's BP. Pt/family is agreeable and understands need for follow up and repeat testing.  Pt is aware that discharge does not mean that nothing is wrong but it indicates no emergency is present that requires admission and they must continue care with follow-up as given below or physician of their choice.     FOLLOW-UP  Dominique Ontiveros, APRN  [address] [id number] [phone]    Schedule an appointment as soon as possible for a visit            Medication List      New Prescriptions    HYDROcodone-acetaminophen 5-325 MG per tablet  Commonly known as: NORCO  Take 1 tablet by mouth Every 6 (Six) Hours As Needed for Moderate Pain .           Where to Get Your Medications      These medications were sent to [id number] - [place], [place] - [address] AT [facility] [phone] [facility] [phone]   [address], [facility]  [address]    Phone: [phone]   · HYDROcodone-acetaminophen 5-325 MG per tablet             Latest Documented Vital Signs:  As of 14:38 EDT  BP- 134/91 HR- 95 Temp- 98.4 °F (36.9 °C) O2 sat- 97%                     Saurabh Jeronimo MD  09/10/21 4724

## 2021-09-12 ENCOUNTER — NURSE TRIAGE (OUTPATIENT)
Dept: CALL CENTER | Facility: HOSPITAL | Age: 36
End: 2021-09-12

## 2021-09-12 NOTE — TELEPHONE ENCOUNTER
Caller was seen in the ER and diagnosed with mild pancreatitis and given pain med at [facility] as there were no beds for admission.  He states that the ER MD told him he would refill the [place] prescription if needed.  Explained to caller that he would have to discuss it with Dr. Jeronimo if he was working today.  If there is another provider on today he would have to be reevaluated.    Reason for Disposition  • Prescription refill request for a CONTROLLED substance (e.g., narcotics, ADHD medicines)    Additional Information  • Negative: [1] Intentional drug overdose AND [2] suicidal thoughts or ideas  • Negative: Drug overdose and triager unable to answer question  • Negative: Caller requesting information unrelated to medicine  • Negative: Caller requesting information about COVID-19 Vaccine  • Negative: Caller requesting information about Emergency Contraception  • Negative: Caller requesting information about Combined Birth Control Pills  • Negative: Caller requesting information about Progestin Birth Control Pills  • Negative: Caller requesting information about Post-Op pain or medicines  • Negative: Caller requesting a prescription antibiotic (such as Penicillin) for Strep throat and has a positive culture result  • Negative: Caller requesting a prescription anti-viral med (such as Tamiflu) and has influenza (flu)  symptoms  • Negative: Immunization reaction suspected  • Negative: Rash while taking a medicine or within 3 days of stopping it  • Negative: [1] Asthma and [2] having symptoms of asthma (cough, wheezing, etc.)  • Negative: [1] Symptom of illness (e.g., headache, abdominal pain, earache, vomiting) AND [2] more than mild  • Negative: Breastfeeding questions about mother's medicines and diet  • Negative: MORE THAN A DOUBLE DOSE of a prescription or over-the-counter (OTC) drug  • Negative: [1] DOUBLE DOSE (an extra dose or lesser amount) of prescription drug AND [2] any symptoms (e.g., dizziness, nausea, pain,  "sleepiness)  • Negative: [1] DOUBLE DOSE (an extra dose or lesser amount) of over-the-counter (OTC) drug AND [2] any symptoms (e.g., dizziness, nausea, pain, sleepiness)  • Negative: Took another person's prescription drug  • Negative: [1] DOUBLE DOSE (an extra dose or lesser amount) of prescription drug AND [2] NO symptoms (Exception: a double dose of antibiotics)  • Negative: Diabetes drug error or overdose (e.g., took wrong type of insulin or took extra dose)  • Negative: [1] Prescription refill request for ESSENTIAL medicine (i.e., likelihood of harm to patient if not taken) AND [2] triager unable to refill per department policy  • Negative: [1] Prescription not at pharmacy AND [2] was prescribed by PCP recently (Exception: triager has access to EMR and prescription is recorded there. Go to Home [facility] and confirm for pharmacy.)  • Negative: [1] Pharmacy calling with prescription question AND [2] triager unable to answer question  • Negative: [1] Caller has URGENT medicine question about med that PCP or specialist prescribed AND [2] triager unable to answer question  • Negative: Medicine patch causing local rash or itching  • Negative: [1] Caller has medicine question about med NOT prescribed by PCP AND [2] triager unable to answer question (e.g., compatibility with other med, storage)  • Negative: Prescription request for new medicine (not a refill)    Answer Assessment - Initial Assessment Questions  1. NAME of MEDICATION: \"What medicine are you calling about?\"      norco  2. QUESTION: \"What is your question?\" (e.g., medication refill, side effect)      He said he would refill it if I ran out  3. PRESCRIBING HCP: \"Who prescribed it?\" Reason: if prescribed by specialist, call should be referred to that group.      Dr. Jeronimo  4. SYMPTOMS: \"Do you have any symptoms?\"      yes  5. SEVERITY: If symptoms are present, ask \"Are they mild, moderate or severe?\"      Mild to mod  6. PREGNANCY:  \"Is there any chance " "that you are pregnant?\" \"When was your last menstrual period?\"      na    Protocols used: MEDICATION QUESTION CALL-ADULT-      "

## 2022-02-09 ENCOUNTER — HOSPITAL ENCOUNTER (EMERGENCY)
Facility: HOSPITAL | Age: 37
Discharge: HOME | End: 2022-02-09
Attending: EMERGENCY MEDICINE | Admitting: EMERGENCY MEDICINE

## 2022-02-09 VITALS
HEART RATE: 86 BPM | DIASTOLIC BLOOD PRESSURE: 102 MMHG | WEIGHT: 165 LBS | RESPIRATION RATE: 18 BRPM | HEIGHT: 69 IN | TEMPERATURE: 97.5 F | OXYGEN SATURATION: 94 % | SYSTOLIC BLOOD PRESSURE: 145 MMHG | BODY MASS INDEX: 24.44 KG/M2

## 2022-02-09 DIAGNOSIS — K85.20 ALCOHOL-INDUCED ACUTE PANCREATITIS, UNSPECIFIED COMPLICATION STATUS: Primary | ICD-10-CM

## 2022-02-09 LAB
ALBUMIN SERPL-MCNC: 4.8 G/DL (ref 3.5–5.2)
ALBUMIN/GLOB SERPL: 1.7 G/DL
ALP SERPL-CCNC: 126 U/L (ref 39–117)
ALT SERPL W P-5'-P-CCNC: 28 U/L (ref 1–41)
ANION GAP SERPL CALCULATED.3IONS-SCNC: 12 MMOL/L (ref 5–15)
AST SERPL-CCNC: 29 U/L (ref 1–40)
BASOPHILS # BLD AUTO: 0.06 10*3/MM3 (ref 0–0.2)
BASOPHILS NFR BLD AUTO: 0.5 % (ref 0–1.5)
BILIRUB SERPL-MCNC: 2.4 MG/DL (ref 0–1.2)
BUN SERPL-MCNC: 10 MG/DL (ref 6–20)
BUN/CREAT SERPL: 9.9 (ref 7–25)
CALCIUM SPEC-SCNC: 9.6 MG/DL (ref 8.6–10.5)
CHLORIDE SERPL-SCNC: 94 MMOL/L (ref 98–107)
CO2 SERPL-SCNC: 23 MMOL/L (ref 22–29)
CREAT SERPL-MCNC: 1.01 MG/DL (ref 0.76–1.27)
DEPRECATED RDW RBC AUTO: 40.6 FL (ref 37–54)
EOSINOPHIL # BLD AUTO: 0.12 10*3/MM3 (ref 0–0.4)
EOSINOPHIL NFR BLD AUTO: 1.1 % (ref 0.3–6.2)
ERYTHROCYTE [DISTWIDTH] IN BLOOD BY AUTOMATED COUNT: 12.6 % (ref 12.3–15.4)
GFR SERPL CREATININE-BSD FRML MDRD: 84 ML/MIN/1.73
GLOBULIN UR ELPH-MCNC: 2.8 GM/DL
GLUCOSE SERPL-MCNC: 111 MG/DL (ref 65–99)
HCT VFR BLD AUTO: 48.8 % (ref 37.5–51)
HGB BLD-MCNC: 17.8 G/DL (ref 13–17.7)
IMM GRANULOCYTES # BLD AUTO: 0.06 10*3/MM3 (ref 0–0.05)
IMM GRANULOCYTES NFR BLD AUTO: 0.5 % (ref 0–0.5)
LIPASE SERPL-CCNC: 852 U/L (ref 13–60)
LYMPHOCYTES # BLD AUTO: 1.6 10*3/MM3 (ref 0.7–3.1)
LYMPHOCYTES NFR BLD AUTO: 14.6 % (ref 19.6–45.3)
MCH RBC QN AUTO: 32.9 PG (ref 26.6–33)
MCHC RBC AUTO-ENTMCNC: 36.5 G/DL (ref 31.5–35.7)
MCV RBC AUTO: 90.2 FL (ref 79–97)
MONOCYTES # BLD AUTO: 1.06 10*3/MM3 (ref 0.1–0.9)
MONOCYTES NFR BLD AUTO: 9.7 % (ref 5–12)
NEUTROPHILS NFR BLD AUTO: 73.6 % (ref 42.7–76)
NEUTROPHILS NFR BLD AUTO: 8.06 10*3/MM3 (ref 1.7–7)
NRBC BLD AUTO-RTO: 0 /100 WBC (ref 0–0.2)
PLATELET # BLD AUTO: 342 10*3/MM3 (ref 140–450)
PMV BLD AUTO: 8.9 FL (ref 6–12)
POTASSIUM SERPL-SCNC: 3.7 MMOL/L (ref 3.5–5.2)
PROT SERPL-MCNC: 7.6 G/DL (ref 6–8.5)
RBC # BLD AUTO: 5.41 10*6/MM3 (ref 4.14–5.8)
SODIUM SERPL-SCNC: 129 MMOL/L (ref 136–145)
WBC NRBC COR # BLD: 10.96 10*3/MM3 (ref 3.4–10.8)

## 2022-02-09 PROCEDURE — 83690 ASSAY OF LIPASE: CPT | Performed by: EMERGENCY MEDICINE

## 2022-02-09 PROCEDURE — 25010000002 ONDANSETRON PER 1 MG: Performed by: EMERGENCY MEDICINE

## 2022-02-09 PROCEDURE — 96375 TX/PRO/DX INJ NEW DRUG ADDON: CPT

## 2022-02-09 PROCEDURE — 96374 THER/PROPH/DIAG INJ IV PUSH: CPT

## 2022-02-09 PROCEDURE — 85025 COMPLETE CBC W/AUTO DIFF WBC: CPT | Performed by: EMERGENCY MEDICINE

## 2022-02-09 PROCEDURE — 80053 COMPREHEN METABOLIC PANEL: CPT | Performed by: EMERGENCY MEDICINE

## 2022-02-09 PROCEDURE — 36415 COLL VENOUS BLD VENIPUNCTURE: CPT

## 2022-02-09 PROCEDURE — 99283 EMERGENCY DEPT VISIT LOW MDM: CPT

## 2022-02-09 PROCEDURE — 25010000002 MORPHINE PER 10 MG: Performed by: EMERGENCY MEDICINE

## 2022-02-09 RX ORDER — ONDANSETRON 2 MG/ML
4 INJECTION INTRAMUSCULAR; INTRAVENOUS ONCE
Status: COMPLETED | OUTPATIENT
Start: 2022-02-09 | End: 2022-02-09

## 2022-02-09 RX ORDER — SODIUM CHLORIDE 0.9 % (FLUSH) 0.9 %
10 SYRINGE (ML) INJECTION AS NEEDED
Status: DISCONTINUED | OUTPATIENT
Start: 2022-02-09 | End: 2022-02-09 | Stop reason: HOSPADM

## 2022-02-09 RX ORDER — MORPHINE SULFATE 2 MG/ML
4 INJECTION, SOLUTION INTRAMUSCULAR; INTRAVENOUS ONCE
Status: COMPLETED | OUTPATIENT
Start: 2022-02-09 | End: 2022-02-09

## 2022-02-09 RX ORDER — HYDROCODONE BITARTRATE AND ACETAMINOPHEN 5; 325 MG/1; MG/1
1 TABLET ORAL EVERY 6 HOURS PRN
Qty: 6 TABLET | Refills: 0 | Status: ON HOLD | OUTPATIENT
Start: 2022-02-09 | End: 2022-04-25

## 2022-02-09 RX ORDER — ONDANSETRON 4 MG/1
4 TABLET, ORALLY DISINTEGRATING ORAL 4 TIMES DAILY PRN
Qty: 12 TABLET | Refills: 0 | Status: ON HOLD | OUTPATIENT
Start: 2022-02-09 | End: 2022-04-25

## 2022-02-09 RX ADMIN — MORPHINE SULFATE 4 MG: 2 INJECTION, SOLUTION INTRAMUSCULAR; INTRAVENOUS at 16:54

## 2022-02-09 RX ADMIN — ONDANSETRON 4 MG: 2 INJECTION INTRAMUSCULAR; INTRAVENOUS at 16:54

## 2022-02-09 NOTE — ED PROVIDER NOTES
EMERGENCY DEPARTMENT ENCOUNTER    Room Number:  33/33  Date seen:  2/9/2022  PCP: Dominique Ontiveros APRN  Historian: Patient      HPI:  Chief Complaint: Abdominal pain  A complete HPI/ROS/PMH/PSH/SH/FH are unobtainable due to: Nothing  Context: Mane Gunderson is a 36 y.o. male who presents to the ED c/o abdominal pain and concern for possible pancreatitis.  Patient reports he does have a history of alcohol induced pancreatitis and he has been drinking a lot recently.  He is not necessarily a daily drinker but he does drink heavily at times.  He began having abdominal pain yesterday and he also had vomiting yesterday.  He stopped drinking yesterday afternoon and he has only been eating bland foods such as frozen galen and rice cakes today.  He has not been vomiting today but he has persistent mid epigastric abdominal pain.  He denies diarrhea.  He has not had a bowel movement today.  He denies bloody emesis.  No fever or chills.  He denies any significant alcohol withdrawal previously.  He is status post appendectomy.            PAST MEDICAL HISTORY  Active Ambulatory Problems     Diagnosis Date Noted   • Appendicitis 08/10/2016   • Ruptured appendicitis 08/14/2016     Resolved Ambulatory Problems     Diagnosis Date Noted   • No Resolved Ambulatory Problems     Past Medical History:   Diagnosis Date   • ADD (attention deficit disorder)    • Alcohol abuse    • Dyslexia    • Pancreatitis    • Posttraumatic stress disorder          PAST SURGICAL HISTORY  Past Surgical History:   Procedure Laterality Date   • APPENDECTOMY N/A 8/10/2016    Procedure: APPENDECTOMY LAPAROSCOPIC;  Surgeon: Bird Vazquez Jr., MD;  Location: [facility];  Service:          FAMILY HISTORY  No family history on file.      SOCIAL HISTORY  Social History     Socioeconomic History   • Marital status: Single   Tobacco Use   • Smoking status: Current Every Day Smoker     Packs/day: 1.00     Types: Electronic Cigarette   • Smokeless tobacco: Never  Used   Vaping Use   • Vaping Use: Every day   Substance and Sexual Activity   • Alcohol use: Yes     Comment: ONE PINT DAILY   • Drug use: No   • Sexual activity: Defer         ALLERGIES  Haldol [haloperidol lactate] and Codeine        REVIEW OF SYSTEMS  Review of Systems   Review of all 14 systems is negative other than stated in the HPI above.      PHYSICAL EXAM  ED Triage Vitals   Temp Heart Rate Resp BP SpO2   02/09/22 1532 02/09/22 1532 02/09/22 1532 02/09/22 1621 02/09/22 1532   97.5 °F (36.4 °C) 88 18 (!) 145/106 99 %      Temp src Heart Rate Source Patient Position BP Location FiO2 (%)   -- -- 02/09/22 1621 02/09/22 1621 --     Lying Left arm          GENERAL: Awake and alert, no acute distress  HENT: nares patent  EYES: no scleral icterus  CV: regular rhythm, normal rate  RESPIRATORY: normal effort  ABDOMEN: soft, mild midepigastric tenderness without rebound or guarding, abdomen nondistended  MUSCULOSKELETAL: no deformity  NEURO: alert, moves all extremities, follows commands  PSYCH:  calm, cooperative  SKIN: warm, dry    Vital signs and nursing notes reviewed.          LAB RESULTS  Recent Results (from the past 24 hour(s))   Lipase    Collection Time: 02/09/22  4:18 PM    Specimen: Blood   Result Value Ref Range    Lipase 852 (H) 13 - 60 U/L   Comprehensive Metabolic Panel    Collection Time: 02/09/22  4:18 PM    Specimen: Blood   Result Value Ref Range    Glucose 111 (H) 65 - 99 mg/dL    BUN 10 6 - 20 mg/dL    Creatinine 1.01 0.76 - 1.27 mg/dL    Sodium 129 (L) 136 - 145 mmol/L    Potassium 3.7 3.5 - 5.2 mmol/L    Chloride 94 (L) 98 - 107 mmol/L    CO2 23.0 22.0 - 29.0 mmol/L    Calcium 9.6 8.6 - 10.5 mg/dL    Total Protein 7.6 6.0 - 8.5 g/dL    Albumin 4.80 3.50 - 5.20 g/dL    ALT (SGPT) 28 1 - 41 U/L    AST (SGOT) 29 1 - 40 U/L    Alkaline Phosphatase 126 (H) 39 - 117 U/L    Total Bilirubin 2.4 (H) 0.0 - 1.2 mg/dL    eGFR Non African Amer 84 >60 mL/min/1.73    Globulin 2.8 gm/dL    A/G Ratio 1.7 g/dL     BUN/Creatinine Ratio 9.9 7.0 - 25.0    Anion Gap 12.0 5.0 - 15.0 mmol/L   CBC Auto Differential    Collection Time: 02/09/22  4:18 PM    Specimen: Blood   Result Value Ref Range    WBC 10.96 (H) 3.40 - 10.80 10*3/mm3    RBC 5.41 4.14 - 5.80 10*6/mm3    Hemoglobin 17.8 (H) 13.0 - 17.7 g/dL    Hematocrit 48.8 37.5 - 51.0 %    MCV 90.2 79.0 - 97.0 fL    MCH 32.9 26.6 - 33.0 pg    MCHC 36.5 (H) 31.5 - 35.7 g/dL    RDW 12.6 12.3 - 15.4 %    RDW-SD 40.6 37.0 - 54.0 fl    MPV 8.9 6.0 - 12.0 fL    Platelets 342 140 - 450 10*3/mm3    Neutrophil % 73.6 42.7 - 76.0 %    Lymphocyte % 14.6 (L) 19.6 - 45.3 %    Monocyte % 9.7 5.0 - 12.0 %    Eosinophil % 1.1 0.3 - 6.2 %    Basophil % 0.5 0.0 - 1.5 %    Immature Grans % 0.5 0.0 - 0.5 %    Neutrophils, Absolute 8.06 (H) 1.70 - 7.00 10*3/mm3    Lymphocytes, Absolute 1.60 0.70 - 3.10 10*3/mm3    Monocytes, Absolute 1.06 (H) 0.10 - 0.90 10*3/mm3    Eosinophils, Absolute 0.12 0.00 - 0.40 10*3/mm3    Basophils, Absolute 0.06 0.00 - 0.20 10*3/mm3    Immature Grans, Absolute 0.06 (H) 0.00 - 0.05 10*3/mm3    nRBC 0.0 0.0 - 0.2 /100 WBC       Ordered the above labs and reviewed the results.        RADIOLOGY  No Radiology Exams Resulted Within Past 24 Hours    Ordered the above noted radiological studies. Reviewed by me in PACS.            PROCEDURES  Procedures              MEDICATIONS GIVEN IN ER  Medications   sodium chloride 0.9 % flush 10 mL (has no administration in time range)   morphine injection 4 mg (4 mg Intravenous Given 2/9/22 6980)   ondansetron (ZOFRAN) injection 4 mg (4 mg Intravenous Given 2/9/22 6051)                   MEDICAL DECISION MAKING, PROGRESS, and CONSULTS    All labs have been independently reviewed by me.  All radiology studies have been reviewed by me and discussed with radiologist dictating the report.   EKG's independently viewed and interpreted by me.  Discussion below represents my analysis of pertinent findings related to patient's condition,  differential diagnosis, treatment plan and final disposition.      Differential diagnosis includes but is not limited to:  Pancreatitis  Gastritis  Biliary colic  Cholecystitis  Colitis  Gastroenteritis      ED Course as of 02/09/22 1819 Wed Feb 09, 2022 1723 Patient reassessed.  His pain is well controlled at this time.  I explained that he does not fact have pancreatitis.  I recommended admission for observation, IV fluids, pain control.  He would prefer to go home.  I explained that I can prescribe him pain and nausea medication and have him utilize a clear liquid diet for the next 2 days but this may still result in him having worsening symptoms requiring reevaluation in the emergency department or even later admission to the hospital.  He is aware of this and is agreeable with plan.  Return precautions were discussed. [JR]   1818 Lipase(!): 852 [JR]   1818 WBC(!): 10.96 [JR]   1818 Hemoglobin(!): 17.8 [JR]   1818 Sodium(!): 129 [JR]   1818 Glucose(!): 111 [JR]   1818 Alkaline Phosphatase(!): 126 [JR]   1818 AST (SGOT): 29 [JR]   1818 ALT (SGPT): 28 [JR]      ED Course User Index  [JR] Ramy Jackson MD            I wore an N95 mask, face shield, and gloves during this patient encounter.  Patient also wearing a surgical mask.  Hand hygeine performed before and after seeing the patient.    DIAGNOSIS  Final diagnoses:   Alcohol-induced acute pancreatitis, unspecified complication status         DISPOSITION  DISCHARGE    Patient discharged in stable condition.    Reviewed implications of results, diagnosis, meds, responsibility to follow up, warning signs and symptoms of possible worsening, potential complications and reasons to return to ER.    Patient/Family voiced understanding of above instructions.    Discussed plan for discharge, as there is no emergent indication for admission. Patient referred to primary care provider for BP management due to today's BP. Pt/family is agreeable and understands  need for follow up and repeat testing.  Pt is aware that discharge does not mean that nothing is wrong but it indicates no emergency is present that requires admission and they must continue care with follow-up as given below or physician of their choice.     FOLLOW-UP  Dominique Ontiveros APRN  [address] [id number] [phone]    Schedule an appointment as soon as possible for a visit            Where to Get Your Medications      These medications were sent to [id number] - [place], [place] - [address] AT [facility] - [phone]  - [phone]   [address], [facility] [address]    Phone: [phone]   · HYDROcodone-acetaminophen 5-325 MG per tablet  · ondansetron ODT 4 MG disintegrating tablet        Medication List      No changes were made to your prescriptions during this visit.                   Latest Documented Vital Signs:  As of 18:18 EST  BP- (!) 145/102 HR- 86 Temp- 97.5 °F (36.4 °C) O2 sat- 94%        --    Please note that portions of this were completed with a voice recognition program.          Ramy Jackson MD  02/09/22 2233

## 2022-02-09 NOTE — ED NOTES
N/V, abd pain, constipation since yesterday. Hx of pancreatitis. ETOH use. Last drink yesterday 1800.     Patient was placed in face mask during triage process. Patient was wearing facemask when I entered the room and throughout our encounter. I wore full protective equipment throughout this patient encounter including a face mask, eye protection, and gloves. Hand hygiene was performed before donning protective equipment and again following doffing of PPE after leaving the room.       Verónica Hughes, RN  02/09/22 1190

## 2022-02-09 NOTE — DISCHARGE INSTRUCTIONS
Please abstain from alcohol and drink only clear liquids for the next 1 to 2 days until your symptoms have improved.  You may take the pain and nausea medication as needed.  If symptoms worsen, return to the ER for repeat evaluation.  
No

## 2022-04-25 ENCOUNTER — HOSPITAL ENCOUNTER (INPATIENT)
Facility: HOSPITAL | Age: 37
LOS: 5 days | Discharge: HOME | End: 2022-04-30
Attending: EMERGENCY MEDICINE | Admitting: INTERNAL MEDICINE

## 2022-04-25 ENCOUNTER — APPOINTMENT (OUTPATIENT)
Dept: CT IMAGING | Facility: HOSPITAL | Age: 37
End: 2022-04-25

## 2022-04-25 DIAGNOSIS — F10.939 ALCOHOL WITHDRAWAL SYNDROME WITH COMPLICATION: ICD-10-CM

## 2022-04-25 DIAGNOSIS — K85.20 ALCOHOL-INDUCED ACUTE PANCREATITIS, UNSPECIFIED COMPLICATION STATUS: Primary | ICD-10-CM

## 2022-04-25 DIAGNOSIS — K85.20 ALCOHOL-INDUCED ACUTE PANCREATITIS WITHOUT INFECTION OR NECROSIS: ICD-10-CM

## 2022-04-25 PROBLEM — F41.9 ANXIETY: Status: ACTIVE | Noted: 2022-04-25

## 2022-04-25 PROBLEM — G47.00 INSOMNIA: Status: ACTIVE | Noted: 2022-04-25

## 2022-04-25 PROBLEM — E87.6 HYPOKALEMIA: Status: ACTIVE | Noted: 2022-04-25

## 2022-04-25 PROBLEM — K92.0 HEMATEMESIS WITH NAUSEA: Status: ACTIVE | Noted: 2022-04-25

## 2022-04-25 PROBLEM — F10.932 ALCOHOL WITHDRAWAL SYNDROME WITH PERCEPTUAL DISTURBANCE: Status: ACTIVE | Noted: 2022-04-25

## 2022-04-25 PROBLEM — K64.9 HEMORRHOIDS: Status: ACTIVE | Noted: 2021-02-18

## 2022-04-25 PROBLEM — E83.42 HYPOMAGNESEMIA: Status: ACTIVE | Noted: 2022-04-25

## 2022-04-25 PROBLEM — F90.9 ATTENTION DEFICIT HYPERACTIVITY DISORDER (ADHD): Status: ACTIVE | Noted: 2022-04-25

## 2022-04-25 PROBLEM — M54.9 CHRONIC BACK PAIN: Status: ACTIVE | Noted: 2021-02-18

## 2022-04-25 PROBLEM — G89.29 CHRONIC BACK PAIN: Status: ACTIVE | Noted: 2021-02-18

## 2022-04-25 PROBLEM — G43.909 MIGRAINE HEADACHE: Status: ACTIVE | Noted: 2021-02-18

## 2022-04-25 LAB
ALBUMIN SERPL-MCNC: 4.8 G/DL (ref 3.5–5.2)
ALBUMIN/GLOB SERPL: 2.4 G/DL
ALP SERPL-CCNC: 118 U/L (ref 39–117)
ALT SERPL W P-5'-P-CCNC: 46 U/L (ref 1–41)
AMPHET+METHAMPHET UR QL: NEGATIVE
ANION GAP SERPL CALCULATED.3IONS-SCNC: 14 MMOL/L (ref 5–15)
ANION GAP SERPL CALCULATED.3IONS-SCNC: 19 MMOL/L (ref 5–15)
AST SERPL-CCNC: 49 U/L (ref 1–40)
BARBITURATES UR QL SCN: NEGATIVE
BASOPHILS # BLD AUTO: 0.04 10*3/MM3 (ref 0–0.2)
BASOPHILS NFR BLD AUTO: 0.2 % (ref 0–1.5)
BENZODIAZ UR QL SCN: NEGATIVE
BILIRUB SERPL-MCNC: 1.4 MG/DL (ref 0–1.2)
BUN SERPL-MCNC: 6 MG/DL (ref 6–20)
BUN SERPL-MCNC: 8 MG/DL (ref 6–20)
BUN/CREAT SERPL: 9.2 (ref 7–25)
BUN/CREAT SERPL: 9.8 (ref 7–25)
CALCIUM SPEC-SCNC: 8.6 MG/DL (ref 8.6–10.5)
CALCIUM SPEC-SCNC: 9.1 MG/DL (ref 8.6–10.5)
CANNABINOIDS SERPL QL: POSITIVE
CHLORIDE SERPL-SCNC: 94 MMOL/L (ref 98–107)
CHLORIDE SERPL-SCNC: 99 MMOL/L (ref 98–107)
CO2 SERPL-SCNC: 20 MMOL/L (ref 22–29)
CO2 SERPL-SCNC: 22 MMOL/L (ref 22–29)
COCAINE UR QL: NEGATIVE
CREAT SERPL-MCNC: 0.65 MG/DL (ref 0.76–1.27)
CREAT SERPL-MCNC: 0.82 MG/DL (ref 0.76–1.27)
DEPRECATED RDW RBC AUTO: 42.8 FL (ref 37–54)
DEPRECATED RDW RBC AUTO: 45.9 FL (ref 37–54)
EGFRCR SERPLBLD CKD-EPI 2021: 117.5 ML/MIN/1.73
EGFRCR SERPLBLD CKD-EPI 2021: 126 ML/MIN/1.73
EOSINOPHIL # BLD AUTO: 0.02 10*3/MM3 (ref 0–0.4)
EOSINOPHIL NFR BLD AUTO: 0.1 % (ref 0.3–6.2)
ERYTHROCYTE [DISTWIDTH] IN BLOOD BY AUTOMATED COUNT: 13.1 % (ref 12.3–15.4)
ERYTHROCYTE [DISTWIDTH] IN BLOOD BY AUTOMATED COUNT: 13.3 % (ref 12.3–15.4)
ETHANOL BLD-MCNC: <10 MG/DL (ref 0–10)
ETHANOL UR QL: <0.01 %
GLOBULIN UR ELPH-MCNC: 2 GM/DL
GLUCOSE SERPL-MCNC: 120 MG/DL (ref 65–99)
GLUCOSE SERPL-MCNC: 139 MG/DL (ref 65–99)
HCT VFR BLD AUTO: 38.5 % (ref 37.5–51)
HCT VFR BLD AUTO: 43.5 % (ref 37.5–51)
HGB BLD-MCNC: 13.8 G/DL (ref 13–17.7)
HGB BLD-MCNC: 15.3 G/DL (ref 13–17.7)
HOLD SPECIMEN: NORMAL
IMM GRANULOCYTES # BLD AUTO: 0.08 10*3/MM3 (ref 0–0.05)
IMM GRANULOCYTES NFR BLD AUTO: 0.5 % (ref 0–0.5)
LIPASE SERPL-CCNC: 789 U/L (ref 13–60)
LYMPHOCYTES # BLD AUTO: 1.28 10*3/MM3 (ref 0.7–3.1)
LYMPHOCYTES NFR BLD AUTO: 7.6 % (ref 19.6–45.3)
MAGNESIUM SERPL-MCNC: 1.3 MG/DL (ref 1.6–2.6)
MCH RBC QN AUTO: 31.9 PG (ref 26.6–33)
MCH RBC QN AUTO: 32.8 PG (ref 26.6–33)
MCHC RBC AUTO-ENTMCNC: 35.2 G/DL (ref 31.5–35.7)
MCHC RBC AUTO-ENTMCNC: 35.8 G/DL (ref 31.5–35.7)
MCV RBC AUTO: 89.1 FL (ref 79–97)
MCV RBC AUTO: 93.1 FL (ref 79–97)
METHADONE UR QL SCN: NEGATIVE
MONOCYTES # BLD AUTO: 1.01 10*3/MM3 (ref 0.1–0.9)
MONOCYTES NFR BLD AUTO: 6 % (ref 5–12)
NEUTROPHILS NFR BLD AUTO: 14.38 10*3/MM3 (ref 1.7–7)
NEUTROPHILS NFR BLD AUTO: 85.6 % (ref 42.7–76)
NRBC BLD AUTO-RTO: 0 /100 WBC (ref 0–0.2)
OPIATES UR QL: NEGATIVE
OXYCODONE UR QL SCN: NEGATIVE
PLATELET # BLD AUTO: 210 10*3/MM3 (ref 140–450)
PLATELET # BLD AUTO: 257 10*3/MM3 (ref 140–450)
PMV BLD AUTO: 8.9 FL (ref 6–12)
PMV BLD AUTO: 9.1 FL (ref 6–12)
POTASSIUM SERPL-SCNC: 3.2 MMOL/L (ref 3.5–5.2)
POTASSIUM SERPL-SCNC: 3.3 MMOL/L (ref 3.5–5.2)
PROT SERPL-MCNC: 6.8 G/DL (ref 6–8.5)
QT INTERVAL: 445 MS
RBC # BLD AUTO: 4.32 10*6/MM3 (ref 4.14–5.8)
RBC # BLD AUTO: 4.67 10*6/MM3 (ref 4.14–5.8)
SARS-COV-2 RNA RESP QL NAA+PROBE: NOT DETECTED
SODIUM SERPL-SCNC: 133 MMOL/L (ref 136–145)
SODIUM SERPL-SCNC: 135 MMOL/L (ref 136–145)
WBC NRBC COR # BLD: 12.64 10*3/MM3 (ref 3.4–10.8)
WBC NRBC COR # BLD: 16.81 10*3/MM3 (ref 3.4–10.8)
WHOLE BLOOD HOLD SPECIMEN: NORMAL

## 2022-04-25 PROCEDURE — 25010000002 HYDROMORPHONE PER 4 MG: Performed by: NURSE PRACTITIONER

## 2022-04-25 PROCEDURE — 36415 COLL VENOUS BLD VENIPUNCTURE: CPT

## 2022-04-25 PROCEDURE — 25010000002 ONDANSETRON PER 1 MG: Performed by: PHYSICIAN ASSISTANT

## 2022-04-25 PROCEDURE — 25010000002 THIAMINE PER 100 MG: Performed by: PHYSICIAN ASSISTANT

## 2022-04-25 PROCEDURE — 80053 COMPREHEN METABOLIC PANEL: CPT | Performed by: PHYSICIAN ASSISTANT

## 2022-04-25 PROCEDURE — 99285 EMERGENCY DEPT VISIT HI MDM: CPT

## 2022-04-25 PROCEDURE — 93005 ELECTROCARDIOGRAM TRACING: CPT | Performed by: PHYSICIAN ASSISTANT

## 2022-04-25 PROCEDURE — 25010000002 IOPAMIDOL 61 % SOLUTION: Performed by: EMERGENCY MEDICINE

## 2022-04-25 PROCEDURE — 25010000002 HYDROMORPHONE 1 MG/ML SOLUTION: Performed by: EMERGENCY MEDICINE

## 2022-04-25 PROCEDURE — 83690 ASSAY OF LIPASE: CPT | Performed by: PHYSICIAN ASSISTANT

## 2022-04-25 PROCEDURE — 90791 PSYCH DIAGNOSTIC EVALUATION: CPT

## 2022-04-25 PROCEDURE — 25010000002 PROCHLORPERAZINE 10 MG/2ML SOLUTION: Performed by: NURSE PRACTITIONER

## 2022-04-25 PROCEDURE — 74177 CT ABD & PELVIS W/CONTRAST: CPT

## 2022-04-25 PROCEDURE — 25010000002 LORAZEPAM PER 2 MG: Performed by: EMERGENCY MEDICINE

## 2022-04-25 PROCEDURE — 80307 DRUG TEST PRSMV CHEM ANLYZR: CPT | Performed by: PHYSICIAN ASSISTANT

## 2022-04-25 PROCEDURE — 85025 COMPLETE CBC W/AUTO DIFF WBC: CPT | Performed by: PHYSICIAN ASSISTANT

## 2022-04-25 PROCEDURE — 93010 ELECTROCARDIOGRAM REPORT: CPT | Performed by: INTERNAL MEDICINE

## 2022-04-25 PROCEDURE — U0003 INFECTIOUS AGENT DETECTION BY NUCLEIC ACID (DNA OR RNA); SEVERE ACUTE RESPIRATORY SYNDROME CORONAVIRUS 2 (SARS-COV-2) (CORONAVIRUS DISEASE [COVID-19]), AMPLIFIED PROBE TECHNIQUE, MAKING USE OF HIGH THROUGHPUT TECHNOLOGIES AS DESCRIBED BY CMS-2020-01-R: HCPCS | Performed by: PHYSICIAN ASSISTANT

## 2022-04-25 PROCEDURE — 0 MAGNESIUM SULFATE 4 GM/100ML SOLUTION: Performed by: INTERNAL MEDICINE

## 2022-04-25 PROCEDURE — 82077 ASSAY SPEC XCP UR&BREATH IA: CPT | Performed by: PHYSICIAN ASSISTANT

## 2022-04-25 PROCEDURE — 25010000002 LORAZEPAM PER 2 MG: Performed by: NURSE PRACTITIONER

## 2022-04-25 PROCEDURE — 63710000001 PROMETHAZINE PER 25 MG: Performed by: EMERGENCY MEDICINE

## 2022-04-25 PROCEDURE — 83735 ASSAY OF MAGNESIUM: CPT | Performed by: PHYSICIAN ASSISTANT

## 2022-04-25 PROCEDURE — 85027 COMPLETE CBC AUTOMATED: CPT | Performed by: NURSE PRACTITIONER

## 2022-04-25 RX ORDER — PROPRANOLOL HYDROCHLORIDE 20 MG/1
20 TABLET ORAL 3 TIMES DAILY
COMMUNITY

## 2022-04-25 RX ORDER — PANTOPRAZOLE SODIUM 40 MG/10ML
40 INJECTION, POWDER, LYOPHILIZED, FOR SOLUTION INTRAVENOUS
Status: DISCONTINUED | OUTPATIENT
Start: 2022-04-25 | End: 2022-04-29

## 2022-04-25 RX ORDER — LORAZEPAM 2 MG/ML
1 INJECTION INTRAMUSCULAR
Status: DISCONTINUED | OUTPATIENT
Start: 2022-04-25 | End: 2022-04-30 | Stop reason: HOSPADM

## 2022-04-25 RX ORDER — VENLAFAXINE HYDROCHLORIDE 150 MG/1
150 CAPSULE, EXTENDED RELEASE ORAL EVERY MORNING
COMMUNITY

## 2022-04-25 RX ORDER — PROMETHAZINE HYDROCHLORIDE 25 MG/1
25 TABLET ORAL ONCE
Status: COMPLETED | OUTPATIENT
Start: 2022-04-25 | End: 2022-04-25

## 2022-04-25 RX ORDER — SODIUM CHLORIDE 0.9 % (FLUSH) 0.9 %
10 SYRINGE (ML) INJECTION AS NEEDED
Status: DISCONTINUED | OUTPATIENT
Start: 2022-04-25 | End: 2022-04-30 | Stop reason: HOSPADM

## 2022-04-25 RX ORDER — CALCIUM CARBONATE 200(500)MG
2 TABLET,CHEWABLE ORAL 2 TIMES DAILY PRN
Status: DISCONTINUED | OUTPATIENT
Start: 2022-04-25 | End: 2022-04-30 | Stop reason: HOSPADM

## 2022-04-25 RX ORDER — HYDRALAZINE HYDROCHLORIDE 25 MG/1
25 TABLET, FILM COATED ORAL EVERY 6 HOURS PRN
Status: DISCONTINUED | OUTPATIENT
Start: 2022-04-25 | End: 2022-04-30 | Stop reason: HOSPADM

## 2022-04-25 RX ORDER — NITROGLYCERIN 0.4 MG/1
0.4 TABLET SUBLINGUAL
Status: DISCONTINUED | OUTPATIENT
Start: 2022-04-25 | End: 2022-04-30 | Stop reason: HOSPADM

## 2022-04-25 RX ORDER — DEXTROAMPHETAMINE SACCHARATE, AMPHETAMINE ASPARTATE, DEXTROAMPHETAMINE SULFATE AND AMPHETAMINE SULFATE 1.25; 1.25; 1.25; 1.25 MG/1; MG/1; MG/1; MG/1
15 TABLET ORAL EVERY 12 HOURS SCHEDULED
Status: DISCONTINUED | OUTPATIENT
Start: 2022-04-25 | End: 2022-04-25

## 2022-04-25 RX ORDER — FOLIC ACID 1 MG/1
1 TABLET ORAL DAILY
Status: COMPLETED | OUTPATIENT
Start: 2022-04-26 | End: 2022-04-28

## 2022-04-25 RX ORDER — HYOSCYAMINE SULFATE 0.125 MG
125 TABLET,DISINTEGRATING ORAL EVERY 4 HOURS PRN
Status: DISCONTINUED | OUTPATIENT
Start: 2022-04-25 | End: 2022-04-30 | Stop reason: HOSPADM

## 2022-04-25 RX ORDER — LORAZEPAM 2 MG/ML
2 INJECTION INTRAMUSCULAR
Status: DISCONTINUED | OUTPATIENT
Start: 2022-04-25 | End: 2022-04-30 | Stop reason: HOSPADM

## 2022-04-25 RX ORDER — LORAZEPAM 2 MG/ML
2 INJECTION INTRAMUSCULAR AS NEEDED
Status: ACTIVE | OUTPATIENT
Start: 2022-04-25 | End: 2022-04-25

## 2022-04-25 RX ORDER — SODIUM CHLORIDE 0.9 % (FLUSH) 0.9 %
10 SYRINGE (ML) INJECTION EVERY 12 HOURS SCHEDULED
Status: DISCONTINUED | OUTPATIENT
Start: 2022-04-25 | End: 2022-04-30 | Stop reason: HOSPADM

## 2022-04-25 RX ORDER — ACETAMINOPHEN 160 MG/5ML
650 SOLUTION ORAL EVERY 4 HOURS PRN
Status: DISCONTINUED | OUTPATIENT
Start: 2022-04-25 | End: 2022-04-30 | Stop reason: HOSPADM

## 2022-04-25 RX ORDER — LORAZEPAM 1 MG/1
1 TABLET ORAL
Status: DISCONTINUED | OUTPATIENT
Start: 2022-04-25 | End: 2022-04-30 | Stop reason: HOSPADM

## 2022-04-25 RX ORDER — LORAZEPAM 2 MG/ML
2 INJECTION INTRAMUSCULAR ONCE
Status: COMPLETED | OUTPATIENT
Start: 2022-04-25 | End: 2022-04-25

## 2022-04-25 RX ORDER — DOCUSATE SODIUM 100 MG/1
100 CAPSULE, LIQUID FILLED ORAL 2 TIMES DAILY
Status: DISCONTINUED | OUTPATIENT
Start: 2022-04-26 | End: 2022-04-30 | Stop reason: HOSPADM

## 2022-04-25 RX ORDER — DEXTROAMPHETAMINE SACCHARATE, AMPHETAMINE ASPARTATE, DEXTROAMPHETAMINE SULFATE AND AMPHETAMINE SULFATE 1.25; 1.25; 1.25; 1.25 MG/1; MG/1; MG/1; MG/1
7.5 TABLET ORAL EVERY 12 HOURS SCHEDULED
Status: DISCONTINUED | OUTPATIENT
Start: 2022-04-25 | End: 2022-04-28

## 2022-04-25 RX ORDER — HYOSCYAMINE SULFATE 0.125 MG
125 TABLET,DISINTEGRATING ORAL EVERY 4 HOURS PRN
COMMUNITY

## 2022-04-25 RX ORDER — PROCHLORPERAZINE EDISYLATE 5 MG/ML
5 INJECTION INTRAMUSCULAR; INTRAVENOUS EVERY 6 HOURS PRN
Status: DISCONTINUED | OUTPATIENT
Start: 2022-04-25 | End: 2022-04-30 | Stop reason: HOSPADM

## 2022-04-25 RX ORDER — PRAMOXINE HYDROCHLORIDE 10 MG/G
AEROSOL, FOAM TOPICAL
Status: DISCONTINUED | OUTPATIENT
Start: 2022-04-25 | End: 2022-04-30 | Stop reason: HOSPADM

## 2022-04-25 RX ORDER — ONDANSETRON 2 MG/ML
8 INJECTION INTRAMUSCULAR; INTRAVENOUS ONCE
Status: COMPLETED | OUTPATIENT
Start: 2022-04-25 | End: 2022-04-25

## 2022-04-25 RX ORDER — VENLAFAXINE HYDROCHLORIDE 150 MG/1
150 CAPSULE, EXTENDED RELEASE ORAL EVERY MORNING
Status: DISCONTINUED | OUTPATIENT
Start: 2022-04-26 | End: 2022-04-30 | Stop reason: HOSPADM

## 2022-04-25 RX ORDER — POTASSIUM CHLORIDE 750 MG/1
40 TABLET, FILM COATED, EXTENDED RELEASE ORAL AS NEEDED
Status: DISCONTINUED | OUTPATIENT
Start: 2022-04-25 | End: 2022-04-30 | Stop reason: HOSPADM

## 2022-04-25 RX ORDER — CYCLOBENZAPRINE HCL 10 MG
10 TABLET ORAL 3 TIMES DAILY PRN
Status: DISCONTINUED | OUTPATIENT
Start: 2022-04-25 | End: 2022-04-30 | Stop reason: HOSPADM

## 2022-04-25 RX ORDER — ACETAMINOPHEN 325 MG/1
650 TABLET ORAL EVERY 4 HOURS PRN
Status: DISCONTINUED | OUTPATIENT
Start: 2022-04-25 | End: 2022-04-30 | Stop reason: HOSPADM

## 2022-04-25 RX ORDER — POTASSIUM CHLORIDE 1.5 G/1.77G
40 POWDER, FOR SOLUTION ORAL AS NEEDED
Status: DISCONTINUED | OUTPATIENT
Start: 2022-04-25 | End: 2022-04-30 | Stop reason: HOSPADM

## 2022-04-25 RX ORDER — SODIUM CHLORIDE 9 MG/ML
50 INJECTION, SOLUTION INTRAVENOUS CONTINUOUS
Status: DISCONTINUED | OUTPATIENT
Start: 2022-04-25 | End: 2022-04-30 | Stop reason: HOSPADM

## 2022-04-25 RX ORDER — MAGNESIUM SULFATE HEPTAHYDRATE 40 MG/ML
4 INJECTION, SOLUTION INTRAVENOUS ONCE
Status: COMPLETED | OUTPATIENT
Start: 2022-04-25 | End: 2022-04-25

## 2022-04-25 RX ORDER — POTASSIUM CHLORIDE 7.45 MG/ML
10 INJECTION INTRAVENOUS
Status: DISCONTINUED | OUTPATIENT
Start: 2022-04-25 | End: 2022-04-30 | Stop reason: HOSPADM

## 2022-04-25 RX ORDER — PROPRANOLOL HYDROCHLORIDE 20 MG/1
20 TABLET ORAL 3 TIMES DAILY
Status: DISCONTINUED | OUTPATIENT
Start: 2022-04-25 | End: 2022-04-30 | Stop reason: HOSPADM

## 2022-04-25 RX ORDER — ACETAMINOPHEN 650 MG/1
650 SUPPOSITORY RECTAL EVERY 4 HOURS PRN
Status: DISCONTINUED | OUTPATIENT
Start: 2022-04-25 | End: 2022-04-30 | Stop reason: HOSPADM

## 2022-04-25 RX ORDER — LORAZEPAM 1 MG/1
2 TABLET ORAL
Status: DISCONTINUED | OUTPATIENT
Start: 2022-04-25 | End: 2022-04-30 | Stop reason: HOSPADM

## 2022-04-25 RX ORDER — DIPHENOXYLATE HYDROCHLORIDE AND ATROPINE SULFATE 2.5; .025 MG/1; MG/1
1 TABLET ORAL DAILY
Status: COMPLETED | OUTPATIENT
Start: 2022-04-26 | End: 2022-04-28

## 2022-04-25 RX ORDER — HYDROMORPHONE HYDROCHLORIDE 1 MG/ML
0.5 INJECTION, SOLUTION INTRAMUSCULAR; INTRAVENOUS; SUBCUTANEOUS
Status: DISCONTINUED | OUTPATIENT
Start: 2022-04-25 | End: 2022-04-27

## 2022-04-25 RX ORDER — MIRTAZAPINE 15 MG/1
15 TABLET, FILM COATED ORAL NIGHTLY
Status: DISCONTINUED | OUTPATIENT
Start: 2022-04-25 | End: 2022-04-30 | Stop reason: HOSPADM

## 2022-04-25 RX ADMIN — HYDRALAZINE HYDROCHLORIDE 25 MG: 25 TABLET, FILM COATED ORAL at 23:18

## 2022-04-25 RX ADMIN — MAGNESIUM SULFATE 4 G: 4 INJECTION INTRAVENOUS at 16:26

## 2022-04-25 RX ADMIN — LORAZEPAM 1 MG: 2 INJECTION INTRAMUSCULAR; INTRAVENOUS at 21:57

## 2022-04-25 RX ADMIN — PROPRANOLOL HYDROCHLORIDE 20 MG: 20 TABLET ORAL at 16:26

## 2022-04-25 RX ADMIN — LORAZEPAM 1 MG: 2 INJECTION INTRAMUSCULAR; INTRAVENOUS at 13:35

## 2022-04-25 RX ADMIN — HYDROMORPHONE HYDROCHLORIDE 0.5 MG: 1 INJECTION, SOLUTION INTRAMUSCULAR; INTRAVENOUS; SUBCUTANEOUS at 14:27

## 2022-04-25 RX ADMIN — ONDANSETRON 8 MG: 2 INJECTION INTRAMUSCULAR; INTRAVENOUS at 02:43

## 2022-04-25 RX ADMIN — HYDROMORPHONE HYDROCHLORIDE 0.5 MG: 1 INJECTION, SOLUTION INTRAMUSCULAR; INTRAVENOUS; SUBCUTANEOUS at 07:50

## 2022-04-25 RX ADMIN — PROMETHAZINE HYDROCHLORIDE 25 MG: 25 TABLET ORAL at 05:31

## 2022-04-25 RX ADMIN — DEXTROAMPHETAMINE SACCHARATE, AMPHETAMINE ASPARTATE, DEXTROAMPHETAMINE SULFATE AND AMPHETAMINE SULFATE 7.5 MG: 1.25; 1.25; 1.25; 1.25 TABLET ORAL at 20:13

## 2022-04-25 RX ADMIN — PANTOPRAZOLE SODIUM 40 MG: 40 INJECTION, POWDER, FOR SOLUTION INTRAVENOUS at 16:26

## 2022-04-25 RX ADMIN — PROCHLORPERAZINE EDISYLATE 5 MG: 5 INJECTION INTRAMUSCULAR; INTRAVENOUS at 15:00

## 2022-04-25 RX ADMIN — PROCHLORPERAZINE EDISYLATE 5 MG: 5 INJECTION INTRAMUSCULAR; INTRAVENOUS at 08:05

## 2022-04-25 RX ADMIN — HYDROMORPHONE HYDROCHLORIDE 0.5 MG: 1 INJECTION, SOLUTION INTRAMUSCULAR; INTRAVENOUS; SUBCUTANEOUS at 09:49

## 2022-04-25 RX ADMIN — HYDROMORPHONE HYDROCHLORIDE 0.5 MG: 1 INJECTION, SOLUTION INTRAMUSCULAR; INTRAVENOUS; SUBCUTANEOUS at 05:45

## 2022-04-25 RX ADMIN — LORAZEPAM 2 MG: 2 INJECTION INTRAMUSCULAR; INTRAVENOUS at 02:11

## 2022-04-25 RX ADMIN — HYDROMORPHONE HYDROCHLORIDE 1 MG: 1 INJECTION, SOLUTION INTRAMUSCULAR; INTRAVENOUS; SUBCUTANEOUS at 03:40

## 2022-04-25 RX ADMIN — HYDROMORPHONE HYDROCHLORIDE 1 MG: 1 INJECTION, SOLUTION INTRAMUSCULAR; INTRAVENOUS; SUBCUTANEOUS at 02:45

## 2022-04-25 RX ADMIN — SODIUM CHLORIDE 100 ML/HR: 9 INJECTION, SOLUTION INTRAVENOUS at 06:42

## 2022-04-25 RX ADMIN — LORAZEPAM 1 MG: 2 INJECTION INTRAMUSCULAR; INTRAVENOUS at 16:36

## 2022-04-25 RX ADMIN — THIAMINE HYDROCHLORIDE 1000 ML/HR: 100 INJECTION, SOLUTION INTRAMUSCULAR; INTRAVENOUS at 03:04

## 2022-04-25 RX ADMIN — HYDROMORPHONE HYDROCHLORIDE 0.5 MG: 1 INJECTION, SOLUTION INTRAMUSCULAR; INTRAVENOUS; SUBCUTANEOUS at 20:13

## 2022-04-25 RX ADMIN — HYDRALAZINE HYDROCHLORIDE 25 MG: 25 TABLET, FILM COATED ORAL at 17:27

## 2022-04-25 RX ADMIN — Medication 10 ML: at 08:05

## 2022-04-25 RX ADMIN — HYDROMORPHONE HYDROCHLORIDE 0.5 MG: 1 INJECTION, SOLUTION INTRAMUSCULAR; INTRAVENOUS; SUBCUTANEOUS at 11:49

## 2022-04-25 RX ADMIN — Medication 10 ML: at 20:14

## 2022-04-25 RX ADMIN — PROPRANOLOL HYDROCHLORIDE 20 MG: 20 TABLET ORAL at 20:12

## 2022-04-25 RX ADMIN — MIRTAZAPINE 15 MG: 15 TABLET, FILM COATED ORAL at 20:13

## 2022-04-25 RX ADMIN — IOPAMIDOL 85 ML: 612 INJECTION, SOLUTION INTRAVENOUS at 03:04

## 2022-04-25 RX ADMIN — SODIUM CHLORIDE, POTASSIUM CHLORIDE, SODIUM LACTATE AND CALCIUM CHLORIDE 1000 ML: 600; 310; 30; 20 INJECTION, SOLUTION INTRAVENOUS at 04:36

## 2022-04-25 RX ADMIN — LORAZEPAM 2 MG: 2 INJECTION INTRAMUSCULAR; INTRAVENOUS at 05:31

## 2022-04-26 ENCOUNTER — INPATIENT HOSPITAL (OUTPATIENT)
Dept: URBAN - METROPOLITAN AREA HOSPITAL 113 | Facility: HOSPITAL | Age: 37
End: 2022-04-26

## 2022-04-26 DIAGNOSIS — R10.84 GENERALIZED ABDOMINAL PAIN: ICD-10-CM

## 2022-04-26 DIAGNOSIS — K59.00 CONSTIPATION, UNSPECIFIED: ICD-10-CM

## 2022-04-26 DIAGNOSIS — K85.20 ALCOHOL INDUCED ACUTE PANCREATITIS WITHOUT NECROSIS OR INFEC: ICD-10-CM

## 2022-04-26 LAB
ALBUMIN SERPL-MCNC: 3.9 G/DL (ref 3.5–5.2)
ALBUMIN/GLOB SERPL: 1.9 G/DL
ALP SERPL-CCNC: 106 U/L (ref 39–117)
ALT SERPL W P-5'-P-CCNC: 26 U/L (ref 1–41)
ANION GAP SERPL CALCULATED.3IONS-SCNC: 10 MMOL/L (ref 5–15)
AST SERPL-CCNC: 29 U/L (ref 1–40)
BILIRUB SERPL-MCNC: 1.4 MG/DL (ref 0–1.2)
BUN SERPL-MCNC: 6 MG/DL (ref 6–20)
BUN/CREAT SERPL: 8.2 (ref 7–25)
CALCIUM SPEC-SCNC: 8.7 MG/DL (ref 8.6–10.5)
CHLORIDE SERPL-SCNC: 99 MMOL/L (ref 98–107)
CO2 SERPL-SCNC: 26 MMOL/L (ref 22–29)
CREAT SERPL-MCNC: 0.73 MG/DL (ref 0.76–1.27)
DEPRECATED RDW RBC AUTO: 41.3 FL (ref 37–54)
EGFRCR SERPLBLD CKD-EPI 2021: 121.7 ML/MIN/1.73
ERYTHROCYTE [DISTWIDTH] IN BLOOD BY AUTOMATED COUNT: 12.7 % (ref 12.3–15.4)
GLOBULIN UR ELPH-MCNC: 2.1 GM/DL
GLUCOSE SERPL-MCNC: 86 MG/DL (ref 65–99)
HCT VFR BLD AUTO: 38.6 % (ref 37.5–51)
HGB BLD-MCNC: 13.8 G/DL (ref 13–17.7)
MAGNESIUM SERPL-MCNC: 2.3 MG/DL (ref 1.6–2.6)
MCH RBC QN AUTO: 31.8 PG (ref 26.6–33)
MCHC RBC AUTO-ENTMCNC: 35.8 G/DL (ref 31.5–35.7)
MCV RBC AUTO: 88.9 FL (ref 79–97)
PHOSPHATE SERPL-MCNC: 2.8 MG/DL (ref 2.5–4.5)
PLATELET # BLD AUTO: 170 10*3/MM3 (ref 140–450)
PMV BLD AUTO: 9.1 FL (ref 6–12)
POTASSIUM SERPL-SCNC: 4.7 MMOL/L (ref 3.5–5.2)
PROT SERPL-MCNC: 6 G/DL (ref 6–8.5)
RBC # BLD AUTO: 4.34 10*6/MM3 (ref 4.14–5.8)
SODIUM SERPL-SCNC: 135 MMOL/L (ref 136–145)
WBC NRBC COR # BLD: 7.45 10*3/MM3 (ref 3.4–10.8)

## 2022-04-26 PROCEDURE — 99222 1ST HOSP IP/OBS MODERATE 55: CPT | Performed by: INTERNAL MEDICINE

## 2022-04-26 PROCEDURE — 80053 COMPREHEN METABOLIC PANEL: CPT | Performed by: INTERNAL MEDICINE

## 2022-04-26 PROCEDURE — 25010000002 LORAZEPAM PER 2 MG: Performed by: NURSE PRACTITIONER

## 2022-04-26 PROCEDURE — 85027 COMPLETE CBC AUTOMATED: CPT | Performed by: INTERNAL MEDICINE

## 2022-04-26 PROCEDURE — 25010000002 PROCHLORPERAZINE 10 MG/2ML SOLUTION: Performed by: NURSE PRACTITIONER

## 2022-04-26 PROCEDURE — 25010000002 HYDROMORPHONE PER 4 MG: Performed by: NURSE PRACTITIONER

## 2022-04-26 PROCEDURE — 84100 ASSAY OF PHOSPHORUS: CPT | Performed by: INTERNAL MEDICINE

## 2022-04-26 PROCEDURE — 83735 ASSAY OF MAGNESIUM: CPT | Performed by: INTERNAL MEDICINE

## 2022-04-26 RX ADMIN — DEXTROAMPHETAMINE SACCHARATE, AMPHETAMINE ASPARTATE, DEXTROAMPHETAMINE SULFATE AND AMPHETAMINE SULFATE 7.5 MG: 1.25; 1.25; 1.25; 1.25 TABLET ORAL at 20:58

## 2022-04-26 RX ADMIN — MIRTAZAPINE 15 MG: 15 TABLET, FILM COATED ORAL at 20:58

## 2022-04-26 RX ADMIN — SODIUM CHLORIDE 125 ML/HR: 9 INJECTION, SOLUTION INTRAVENOUS at 04:35

## 2022-04-26 RX ADMIN — HYDRALAZINE HYDROCHLORIDE 25 MG: 25 TABLET, FILM COATED ORAL at 23:40

## 2022-04-26 RX ADMIN — HYDROMORPHONE HYDROCHLORIDE 0.5 MG: 1 INJECTION, SOLUTION INTRAMUSCULAR; INTRAVENOUS; SUBCUTANEOUS at 14:24

## 2022-04-26 RX ADMIN — DOCUSATE SODIUM 100 MG: 100 CAPSULE, LIQUID FILLED ORAL at 20:59

## 2022-04-26 RX ADMIN — DOCUSATE SODIUM 100 MG: 100 CAPSULE, LIQUID FILLED ORAL at 00:50

## 2022-04-26 RX ADMIN — HYDROMORPHONE HYDROCHLORIDE 0.5 MG: 1 INJECTION, SOLUTION INTRAMUSCULAR; INTRAVENOUS; SUBCUTANEOUS at 04:23

## 2022-04-26 RX ADMIN — POTASSIUM CHLORIDE 40 MEQ: 10 TABLET, EXTENDED RELEASE ORAL at 00:50

## 2022-04-26 RX ADMIN — LORAZEPAM 1 MG: 2 INJECTION INTRAMUSCULAR; INTRAVENOUS at 23:40

## 2022-04-26 RX ADMIN — POTASSIUM CHLORIDE 40 MEQ: 10 TABLET, EXTENDED RELEASE ORAL at 04:27

## 2022-04-26 RX ADMIN — Medication 10 ML: at 08:46

## 2022-04-26 RX ADMIN — PANTOPRAZOLE SODIUM 40 MG: 40 INJECTION, POWDER, FOR SOLUTION INTRAVENOUS at 17:24

## 2022-04-26 RX ADMIN — DEXTROAMPHETAMINE SACCHARATE, AMPHETAMINE ASPARTATE, DEXTROAMPHETAMINE SULFATE AND AMPHETAMINE SULFATE 7.5 MG: 1.25; 1.25; 1.25; 1.25 TABLET ORAL at 08:45

## 2022-04-26 RX ADMIN — PANTOPRAZOLE SODIUM 40 MG: 40 INJECTION, POWDER, FOR SOLUTION INTRAVENOUS at 06:50

## 2022-04-26 RX ADMIN — PROPRANOLOL HYDROCHLORIDE 20 MG: 20 TABLET ORAL at 08:45

## 2022-04-26 RX ADMIN — FOLIC ACID 1 MG: 1 TABLET ORAL at 08:46

## 2022-04-26 RX ADMIN — Medication 1 TABLET: at 08:46

## 2022-04-26 RX ADMIN — HYDRALAZINE HYDROCHLORIDE 25 MG: 25 TABLET, FILM COATED ORAL at 17:19

## 2022-04-26 RX ADMIN — HYDROMORPHONE HYDROCHLORIDE 0.5 MG: 1 INJECTION, SOLUTION INTRAMUSCULAR; INTRAVENOUS; SUBCUTANEOUS at 07:02

## 2022-04-26 RX ADMIN — PROPRANOLOL HYDROCHLORIDE 20 MG: 20 TABLET ORAL at 17:24

## 2022-04-26 RX ADMIN — Medication 10 ML: at 20:59

## 2022-04-26 RX ADMIN — HYDROMORPHONE HYDROCHLORIDE 0.5 MG: 1 INJECTION, SOLUTION INTRAMUSCULAR; INTRAVENOUS; SUBCUTANEOUS at 20:59

## 2022-04-26 RX ADMIN — LORAZEPAM 2 MG: 2 INJECTION INTRAMUSCULAR; INTRAVENOUS at 16:10

## 2022-04-26 RX ADMIN — Medication 100 MG: at 08:46

## 2022-04-26 RX ADMIN — DOCUSATE SODIUM 100 MG: 100 CAPSULE, LIQUID FILLED ORAL at 08:46

## 2022-04-26 RX ADMIN — PROPRANOLOL HYDROCHLORIDE 20 MG: 20 TABLET ORAL at 20:58

## 2022-04-26 RX ADMIN — HYDROMORPHONE HYDROCHLORIDE 0.5 MG: 1 INJECTION, SOLUTION INTRAMUSCULAR; INTRAVENOUS; SUBCUTANEOUS at 00:50

## 2022-04-26 RX ADMIN — PROCHLORPERAZINE EDISYLATE 5 MG: 5 INJECTION INTRAMUSCULAR; INTRAVENOUS at 04:23

## 2022-04-26 RX ADMIN — VENLAFAXINE HYDROCHLORIDE 150 MG: 150 CAPSULE, EXTENDED RELEASE ORAL at 06:50

## 2022-04-27 ENCOUNTER — INPATIENT HOSPITAL (OUTPATIENT)
Dept: URBAN - METROPOLITAN AREA HOSPITAL 113 | Facility: HOSPITAL | Age: 37
End: 2022-04-27

## 2022-04-27 DIAGNOSIS — K59.00 CONSTIPATION, UNSPECIFIED: ICD-10-CM

## 2022-04-27 DIAGNOSIS — K85.20 ALCOHOL INDUCED ACUTE PANCREATITIS WITHOUT NECROSIS OR INFEC: ICD-10-CM

## 2022-04-27 DIAGNOSIS — R10.84 GENERALIZED ABDOMINAL PAIN: ICD-10-CM

## 2022-04-27 LAB
ALBUMIN SERPL-MCNC: 3.8 G/DL (ref 3.5–5.2)
ALBUMIN/GLOB SERPL: 1.4 G/DL
ALP SERPL-CCNC: 98 U/L (ref 39–117)
ALT SERPL W P-5'-P-CCNC: 23 U/L (ref 1–41)
ANION GAP SERPL CALCULATED.3IONS-SCNC: 11.2 MMOL/L (ref 5–15)
AST SERPL-CCNC: 27 U/L (ref 1–40)
BILIRUB SERPL-MCNC: 0.8 MG/DL (ref 0–1.2)
BUN SERPL-MCNC: 6 MG/DL (ref 6–20)
BUN/CREAT SERPL: 7.9 (ref 7–25)
CALCIUM SPEC-SCNC: 8.7 MG/DL (ref 8.6–10.5)
CHLORIDE SERPL-SCNC: 100 MMOL/L (ref 98–107)
CO2 SERPL-SCNC: 25.8 MMOL/L (ref 22–29)
CREAT SERPL-MCNC: 0.76 MG/DL (ref 0.76–1.27)
DEPRECATED RDW RBC AUTO: 43.7 FL (ref 37–54)
EGFRCR SERPLBLD CKD-EPI 2021: 120.2 ML/MIN/1.73
ERYTHROCYTE [DISTWIDTH] IN BLOOD BY AUTOMATED COUNT: 12.9 % (ref 12.3–15.4)
GLOBULIN UR ELPH-MCNC: 2.8 GM/DL
GLUCOSE SERPL-MCNC: 75 MG/DL (ref 65–99)
HCT VFR BLD AUTO: 37.7 % (ref 37.5–51)
HGB BLD-MCNC: 13 G/DL (ref 13–17.7)
MCH RBC QN AUTO: 32.1 PG (ref 26.6–33)
MCHC RBC AUTO-ENTMCNC: 34.5 G/DL (ref 31.5–35.7)
MCV RBC AUTO: 93.1 FL (ref 79–97)
PLATELET # BLD AUTO: 167 10*3/MM3 (ref 140–450)
PMV BLD AUTO: 9.6 FL (ref 6–12)
POTASSIUM SERPL-SCNC: 3.5 MMOL/L (ref 3.5–5.2)
PROT SERPL-MCNC: 6.6 G/DL (ref 6–8.5)
RBC # BLD AUTO: 4.05 10*6/MM3 (ref 4.14–5.8)
SODIUM SERPL-SCNC: 137 MMOL/L (ref 136–145)
WBC NRBC COR # BLD: 6.25 10*3/MM3 (ref 3.4–10.8)

## 2022-04-27 PROCEDURE — 99232 SBSQ HOSP IP/OBS MODERATE 35: CPT | Performed by: INTERNAL MEDICINE

## 2022-04-27 PROCEDURE — 25010000002 LORAZEPAM PER 2 MG: Performed by: NURSE PRACTITIONER

## 2022-04-27 PROCEDURE — 25010000002 HYDROMORPHONE PER 4 MG: Performed by: NURSE PRACTITIONER

## 2022-04-27 PROCEDURE — 85027 COMPLETE CBC AUTOMATED: CPT | Performed by: INTERNAL MEDICINE

## 2022-04-27 PROCEDURE — 80053 COMPREHEN METABOLIC PANEL: CPT | Performed by: INTERNAL MEDICINE

## 2022-04-27 PROCEDURE — 25010000002 HYDROMORPHONE PER 4 MG: Performed by: INTERNAL MEDICINE

## 2022-04-27 RX ORDER — HYDROMORPHONE HYDROCHLORIDE 1 MG/ML
0.25 INJECTION, SOLUTION INTRAMUSCULAR; INTRAVENOUS; SUBCUTANEOUS
Status: DISCONTINUED | OUTPATIENT
Start: 2022-04-27 | End: 2022-04-30

## 2022-04-27 RX ORDER — CHLORDIAZEPOXIDE HYDROCHLORIDE 25 MG/1
25 CAPSULE, GELATIN COATED ORAL 3 TIMES DAILY
Status: DISCONTINUED | OUTPATIENT
Start: 2022-04-27 | End: 2022-04-27

## 2022-04-27 RX ORDER — CHLORDIAZEPOXIDE HYDROCHLORIDE 25 MG/1
50 CAPSULE, GELATIN COATED ORAL 3 TIMES DAILY
Status: DISCONTINUED | OUTPATIENT
Start: 2022-04-27 | End: 2022-04-28

## 2022-04-27 RX ADMIN — LORAZEPAM 2 MG: 1 TABLET ORAL at 13:42

## 2022-04-27 RX ADMIN — DEXTROAMPHETAMINE SACCHARATE, AMPHETAMINE ASPARTATE, DEXTROAMPHETAMINE SULFATE AND AMPHETAMINE SULFATE 7.5 MG: 1.25; 1.25; 1.25; 1.25 TABLET ORAL at 08:18

## 2022-04-27 RX ADMIN — LORAZEPAM 2 MG: 2 INJECTION INTRAMUSCULAR; INTRAVENOUS at 03:47

## 2022-04-27 RX ADMIN — CHLORDIAZEPOXIDE HYDROCHLORIDE 50 MG: 25 CAPSULE ORAL at 21:29

## 2022-04-27 RX ADMIN — LORAZEPAM 2 MG: 2 INJECTION INTRAMUSCULAR; INTRAVENOUS at 23:41

## 2022-04-27 RX ADMIN — PROPRANOLOL HYDROCHLORIDE 20 MG: 20 TABLET ORAL at 21:29

## 2022-04-27 RX ADMIN — DEXTROAMPHETAMINE SACCHARATE, AMPHETAMINE ASPARTATE, DEXTROAMPHETAMINE SULFATE AND AMPHETAMINE SULFATE 7.5 MG: 1.25; 1.25; 1.25; 1.25 TABLET ORAL at 21:29

## 2022-04-27 RX ADMIN — HYDROMORPHONE HYDROCHLORIDE 0.5 MG: 1 INJECTION, SOLUTION INTRAMUSCULAR; INTRAVENOUS; SUBCUTANEOUS at 03:47

## 2022-04-27 RX ADMIN — MIRTAZAPINE 15 MG: 15 TABLET, FILM COATED ORAL at 21:29

## 2022-04-27 RX ADMIN — HYDRALAZINE HYDROCHLORIDE 25 MG: 25 TABLET, FILM COATED ORAL at 08:18

## 2022-04-27 RX ADMIN — LORAZEPAM 2 MG: 2 INJECTION INTRAMUSCULAR; INTRAVENOUS at 12:02

## 2022-04-27 RX ADMIN — LORAZEPAM 2 MG: 1 TABLET ORAL at 17:47

## 2022-04-27 RX ADMIN — Medication 100 MG: at 08:17

## 2022-04-27 RX ADMIN — PROPRANOLOL HYDROCHLORIDE 20 MG: 20 TABLET ORAL at 08:17

## 2022-04-27 RX ADMIN — DOCUSATE SODIUM 100 MG: 100 CAPSULE, LIQUID FILLED ORAL at 08:17

## 2022-04-27 RX ADMIN — SODIUM CHLORIDE 125 ML/HR: 9 INJECTION, SOLUTION INTRAVENOUS at 00:22

## 2022-04-27 RX ADMIN — LORAZEPAM 2 MG: 2 INJECTION INTRAMUSCULAR; INTRAVENOUS at 21:29

## 2022-04-27 RX ADMIN — Medication 10 ML: at 08:18

## 2022-04-27 RX ADMIN — Medication 1 TABLET: at 08:17

## 2022-04-27 RX ADMIN — CHLORDIAZEPOXIDE HYDROCHLORIDE 50 MG: 25 CAPSULE ORAL at 14:30

## 2022-04-27 RX ADMIN — FOLIC ACID 1 MG: 1 TABLET ORAL at 08:17

## 2022-04-27 RX ADMIN — HYDROMORPHONE HYDROCHLORIDE 0.25 MG: 1 INJECTION, SOLUTION INTRAMUSCULAR; INTRAVENOUS; SUBCUTANEOUS at 15:59

## 2022-04-27 RX ADMIN — CHLORDIAZEPOXIDE HYDROCHLORIDE 25 MG: 25 CAPSULE ORAL at 08:18

## 2022-04-27 RX ADMIN — LORAZEPAM 2 MG: 2 INJECTION INTRAMUSCULAR; INTRAVENOUS at 06:24

## 2022-04-27 RX ADMIN — PANTOPRAZOLE SODIUM 40 MG: 40 INJECTION, POWDER, FOR SOLUTION INTRAVENOUS at 06:24

## 2022-04-27 RX ADMIN — VENLAFAXINE HYDROCHLORIDE 150 MG: 150 CAPSULE, EXTENDED RELEASE ORAL at 06:25

## 2022-04-27 RX ADMIN — PROPRANOLOL HYDROCHLORIDE 20 MG: 20 TABLET ORAL at 15:59

## 2022-04-28 ENCOUNTER — INPATIENT HOSPITAL (OUTPATIENT)
Dept: URBAN - METROPOLITAN AREA HOSPITAL 113 | Facility: HOSPITAL | Age: 37
End: 2022-04-28

## 2022-04-28 DIAGNOSIS — K85.20 ALCOHOL INDUCED ACUTE PANCREATITIS WITHOUT NECROSIS OR INFEC: ICD-10-CM

## 2022-04-28 DIAGNOSIS — K59.00 CONSTIPATION, UNSPECIFIED: ICD-10-CM

## 2022-04-28 DIAGNOSIS — R10.84 GENERALIZED ABDOMINAL PAIN: ICD-10-CM

## 2022-04-28 LAB
ALBUMIN SERPL-MCNC: 3.6 G/DL (ref 3.5–5.2)
ALBUMIN SERPL-MCNC: 3.7 G/DL (ref 3.5–5.2)
ALBUMIN/GLOB SERPL: 1.3 G/DL
ALBUMIN/GLOB SERPL: 1.3 G/DL
ALP SERPL-CCNC: 82 U/L (ref 39–117)
ALP SERPL-CCNC: 87 U/L (ref 39–117)
ALT SERPL W P-5'-P-CCNC: 17 U/L (ref 1–41)
ALT SERPL W P-5'-P-CCNC: 19 U/L (ref 1–41)
ANION GAP SERPL CALCULATED.3IONS-SCNC: 11.6 MMOL/L (ref 5–15)
ANION GAP SERPL CALCULATED.3IONS-SCNC: 8.8 MMOL/L (ref 5–15)
AST SERPL-CCNC: 20 U/L (ref 1–40)
AST SERPL-CCNC: 23 U/L (ref 1–40)
BILIRUB SERPL-MCNC: 0.4 MG/DL (ref 0–1.2)
BILIRUB SERPL-MCNC: 0.5 MG/DL (ref 0–1.2)
BUN SERPL-MCNC: 3 MG/DL (ref 6–20)
BUN SERPL-MCNC: 4 MG/DL (ref 6–20)
BUN/CREAT SERPL: 4.3 (ref 7–25)
BUN/CREAT SERPL: 6 (ref 7–25)
CALCIUM SPEC-SCNC: 8.6 MG/DL (ref 8.6–10.5)
CALCIUM SPEC-SCNC: 8.9 MG/DL (ref 8.6–10.5)
CHLORIDE SERPL-SCNC: 104 MMOL/L (ref 98–107)
CHLORIDE SERPL-SCNC: 105 MMOL/L (ref 98–107)
CO2 SERPL-SCNC: 23.2 MMOL/L (ref 22–29)
CO2 SERPL-SCNC: 23.4 MMOL/L (ref 22–29)
CREAT SERPL-MCNC: 0.67 MG/DL (ref 0.76–1.27)
CREAT SERPL-MCNC: 0.69 MG/DL (ref 0.76–1.27)
DEPRECATED RDW RBC AUTO: 42 FL (ref 37–54)
DEPRECATED RDW RBC AUTO: 43.7 FL (ref 37–54)
EGFRCR SERPLBLD CKD-EPI 2021: 123.8 ML/MIN/1.73
EGFRCR SERPLBLD CKD-EPI 2021: 124.9 ML/MIN/1.73
ERYTHROCYTE [DISTWIDTH] IN BLOOD BY AUTOMATED COUNT: 13 % (ref 12.3–15.4)
ERYTHROCYTE [DISTWIDTH] IN BLOOD BY AUTOMATED COUNT: 13.1 % (ref 12.3–15.4)
GLOBULIN UR ELPH-MCNC: 2.8 GM/DL
GLOBULIN UR ELPH-MCNC: 2.9 GM/DL
GLUCOSE SERPL-MCNC: 121 MG/DL (ref 65–99)
GLUCOSE SERPL-MCNC: 124 MG/DL (ref 65–99)
HCT VFR BLD AUTO: 33.9 % (ref 37.5–51)
HCT VFR BLD AUTO: 36.3 % (ref 37.5–51)
HGB BLD-MCNC: 12.2 G/DL (ref 13–17.7)
HGB BLD-MCNC: 12.6 G/DL (ref 13–17.7)
MCH RBC QN AUTO: 31.7 PG (ref 26.6–33)
MCH RBC QN AUTO: 32.1 PG (ref 26.6–33)
MCHC RBC AUTO-ENTMCNC: 34.7 G/DL (ref 31.5–35.7)
MCHC RBC AUTO-ENTMCNC: 36 G/DL (ref 31.5–35.7)
MCV RBC AUTO: 89.2 FL (ref 79–97)
MCV RBC AUTO: 91.4 FL (ref 79–97)
PLATELET # BLD AUTO: 174 10*3/MM3 (ref 140–450)
PLATELET # BLD AUTO: 178 10*3/MM3 (ref 140–450)
PMV BLD AUTO: 8.8 FL (ref 6–12)
PMV BLD AUTO: 9.3 FL (ref 6–12)
POTASSIUM SERPL-SCNC: 2.9 MMOL/L (ref 3.5–5.2)
POTASSIUM SERPL-SCNC: 3.7 MMOL/L (ref 3.5–5.2)
PROT SERPL-MCNC: 6.4 G/DL (ref 6–8.5)
PROT SERPL-MCNC: 6.6 G/DL (ref 6–8.5)
RBC # BLD AUTO: 3.8 10*6/MM3 (ref 4.14–5.8)
RBC # BLD AUTO: 3.97 10*6/MM3 (ref 4.14–5.8)
SODIUM SERPL-SCNC: 137 MMOL/L (ref 136–145)
SODIUM SERPL-SCNC: 139 MMOL/L (ref 136–145)
WBC NRBC COR # BLD: 4.7 10*3/MM3 (ref 3.4–10.8)
WBC NRBC COR # BLD: 5.45 10*3/MM3 (ref 3.4–10.8)

## 2022-04-28 PROCEDURE — 25010000002 HYDROMORPHONE PER 4 MG: Performed by: INTERNAL MEDICINE

## 2022-04-28 PROCEDURE — 25010000002 LORAZEPAM PER 2 MG: Performed by: NURSE PRACTITIONER

## 2022-04-28 PROCEDURE — 80053 COMPREHEN METABOLIC PANEL: CPT | Performed by: INTERNAL MEDICINE

## 2022-04-28 PROCEDURE — 0 POTASSIUM CHLORIDE 10 MEQ/100ML SOLUTION: Performed by: INTERNAL MEDICINE

## 2022-04-28 PROCEDURE — 85027 COMPLETE CBC AUTOMATED: CPT | Performed by: INTERNAL MEDICINE

## 2022-04-28 PROCEDURE — 99233 SBSQ HOSP IP/OBS HIGH 50: CPT | Performed by: INTERNAL MEDICINE

## 2022-04-28 PROCEDURE — 25010000002 PROCHLORPERAZINE 10 MG/2ML SOLUTION: Performed by: NURSE PRACTITIONER

## 2022-04-28 RX ORDER — DEXTROAMPHETAMINE SACCHARATE, AMPHETAMINE ASPARTATE, DEXTROAMPHETAMINE SULFATE AND AMPHETAMINE SULFATE 1.25; 1.25; 1.25; 1.25 MG/1; MG/1; MG/1; MG/1
7.5 TABLET ORAL DAILY
Status: DISCONTINUED | OUTPATIENT
Start: 2022-04-29 | End: 2022-04-30 | Stop reason: HOSPADM

## 2022-04-28 RX ORDER — CHLORDIAZEPOXIDE HYDROCHLORIDE 25 MG/1
25 CAPSULE, GELATIN COATED ORAL 3 TIMES DAILY
Status: DISCONTINUED | OUTPATIENT
Start: 2022-04-28 | End: 2022-04-29

## 2022-04-28 RX ADMIN — PROPRANOLOL HYDROCHLORIDE 20 MG: 20 TABLET ORAL at 20:38

## 2022-04-28 RX ADMIN — LORAZEPAM 2 MG: 2 INJECTION INTRAMUSCULAR; INTRAVENOUS at 01:38

## 2022-04-28 RX ADMIN — CHLORDIAZEPOXIDE HYDROCHLORIDE 50 MG: 25 CAPSULE ORAL at 14:27

## 2022-04-28 RX ADMIN — VENLAFAXINE HYDROCHLORIDE 150 MG: 150 CAPSULE, EXTENDED RELEASE ORAL at 14:27

## 2022-04-28 RX ADMIN — HYDROMORPHONE HYDROCHLORIDE 0.25 MG: 1 INJECTION, SOLUTION INTRAMUSCULAR; INTRAVENOUS; SUBCUTANEOUS at 20:57

## 2022-04-28 RX ADMIN — FOLIC ACID 1 MG: 1 TABLET ORAL at 14:27

## 2022-04-28 RX ADMIN — HYDROMORPHONE HYDROCHLORIDE 0.25 MG: 1 INJECTION, SOLUTION INTRAMUSCULAR; INTRAVENOUS; SUBCUTANEOUS at 14:26

## 2022-04-28 RX ADMIN — CYCLOBENZAPRINE 10 MG: 10 TABLET, FILM COATED ORAL at 17:46

## 2022-04-28 RX ADMIN — LORAZEPAM 2 MG: 2 INJECTION INTRAMUSCULAR; INTRAVENOUS at 00:10

## 2022-04-28 RX ADMIN — Medication 1 TABLET: at 14:27

## 2022-04-28 RX ADMIN — PROCHLORPERAZINE EDISYLATE 5 MG: 5 INJECTION INTRAMUSCULAR; INTRAVENOUS at 20:51

## 2022-04-28 RX ADMIN — CHLORDIAZEPOXIDE HYDROCHLORIDE 25 MG: 25 CAPSULE ORAL at 20:38

## 2022-04-28 RX ADMIN — PROPRANOLOL HYDROCHLORIDE 20 MG: 20 TABLET ORAL at 14:27

## 2022-04-28 RX ADMIN — PANTOPRAZOLE SODIUM 40 MG: 40 INJECTION, POWDER, FOR SOLUTION INTRAVENOUS at 17:38

## 2022-04-28 RX ADMIN — Medication 10 ML: at 20:39

## 2022-04-28 RX ADMIN — SODIUM CHLORIDE 125 ML/HR: 9 INJECTION, SOLUTION INTRAVENOUS at 01:32

## 2022-04-28 RX ADMIN — SODIUM CHLORIDE 125 ML/HR: 9 INJECTION, SOLUTION INTRAVENOUS at 17:38

## 2022-04-28 RX ADMIN — POTASSIUM CHLORIDE 40 MEQ: 10 TABLET, EXTENDED RELEASE ORAL at 14:27

## 2022-04-28 RX ADMIN — DOCUSATE SODIUM 100 MG: 100 CAPSULE, LIQUID FILLED ORAL at 20:37

## 2022-04-28 RX ADMIN — POTASSIUM CHLORIDE 10 MEQ: 7.46 INJECTION, SOLUTION INTRAVENOUS at 09:18

## 2022-04-28 RX ADMIN — Medication 100 MG: at 14:27

## 2022-04-28 RX ADMIN — CYCLOBENZAPRINE 10 MG: 10 TABLET, FILM COATED ORAL at 01:20

## 2022-04-28 RX ADMIN — POTASSIUM CHLORIDE 40 MEQ: 10 TABLET, EXTENDED RELEASE ORAL at 17:38

## 2022-04-28 RX ADMIN — ACETAMINOPHEN 650 MG: 325 TABLET ORAL at 17:45

## 2022-04-28 RX ADMIN — LORAZEPAM 2 MG: 2 INJECTION INTRAMUSCULAR; INTRAVENOUS at 00:45

## 2022-04-28 RX ADMIN — POTASSIUM CHLORIDE 10 MEQ: 7.46 INJECTION, SOLUTION INTRAVENOUS at 11:36

## 2022-04-28 RX ADMIN — PANTOPRAZOLE SODIUM 40 MG: 40 INJECTION, POWDER, FOR SOLUTION INTRAVENOUS at 06:55

## 2022-04-28 RX ADMIN — MIRTAZAPINE 15 MG: 15 TABLET, FILM COATED ORAL at 20:38

## 2022-04-29 ENCOUNTER — INPATIENT HOSPITAL (OUTPATIENT)
Dept: URBAN - METROPOLITAN AREA HOSPITAL 113 | Facility: HOSPITAL | Age: 37
End: 2022-04-29

## 2022-04-29 DIAGNOSIS — K85.20 ALCOHOL INDUCED ACUTE PANCREATITIS WITHOUT NECROSIS OR INFEC: ICD-10-CM

## 2022-04-29 DIAGNOSIS — K59.00 CONSTIPATION, UNSPECIFIED: ICD-10-CM

## 2022-04-29 DIAGNOSIS — R10.84 GENERALIZED ABDOMINAL PAIN: ICD-10-CM

## 2022-04-29 LAB — TROPONIN T SERPL-MCNC: <0.01 NG/ML (ref 0–0.03)

## 2022-04-29 PROCEDURE — 93010 ELECTROCARDIOGRAM REPORT: CPT | Performed by: INTERNAL MEDICINE

## 2022-04-29 PROCEDURE — 25010000002 HYDROMORPHONE PER 4 MG: Performed by: INTERNAL MEDICINE

## 2022-04-29 PROCEDURE — 93005 ELECTROCARDIOGRAM TRACING: CPT | Performed by: INTERNAL MEDICINE

## 2022-04-29 PROCEDURE — 84484 ASSAY OF TROPONIN QUANT: CPT | Performed by: INTERNAL MEDICINE

## 2022-04-29 PROCEDURE — 99232 SBSQ HOSP IP/OBS MODERATE 35: CPT | Performed by: INTERNAL MEDICINE

## 2022-04-29 RX ORDER — PANTOPRAZOLE SODIUM 40 MG/1
40 TABLET, DELAYED RELEASE ORAL
Status: DISCONTINUED | OUTPATIENT
Start: 2022-04-29 | End: 2022-04-30 | Stop reason: HOSPADM

## 2022-04-29 RX ORDER — CHLORDIAZEPOXIDE HYDROCHLORIDE 25 MG/1
25 CAPSULE, GELATIN COATED ORAL NIGHTLY
Status: DISCONTINUED | OUTPATIENT
Start: 2022-04-29 | End: 2022-04-29

## 2022-04-29 RX ADMIN — SODIUM CHLORIDE 50 ML/HR: 9 INJECTION, SOLUTION INTRAVENOUS at 22:26

## 2022-04-29 RX ADMIN — HYDROMORPHONE HYDROCHLORIDE 0.25 MG: 1 INJECTION, SOLUTION INTRAMUSCULAR; INTRAVENOUS; SUBCUTANEOUS at 10:55

## 2022-04-29 RX ADMIN — CYCLOBENZAPRINE 10 MG: 10 TABLET, FILM COATED ORAL at 22:27

## 2022-04-29 RX ADMIN — HYDROMORPHONE HYDROCHLORIDE 0.25 MG: 1 INJECTION, SOLUTION INTRAMUSCULAR; INTRAVENOUS; SUBCUTANEOUS at 12:53

## 2022-04-29 RX ADMIN — HYDROMORPHONE HYDROCHLORIDE 0.25 MG: 1 INJECTION, SOLUTION INTRAMUSCULAR; INTRAVENOUS; SUBCUTANEOUS at 19:50

## 2022-04-29 RX ADMIN — PROPRANOLOL HYDROCHLORIDE 20 MG: 20 TABLET ORAL at 19:57

## 2022-04-29 RX ADMIN — Medication 10 ML: at 20:00

## 2022-04-29 RX ADMIN — MIRTAZAPINE 15 MG: 15 TABLET, FILM COATED ORAL at 20:00

## 2022-04-29 RX ADMIN — DOCUSATE SODIUM 100 MG: 100 CAPSULE, LIQUID FILLED ORAL at 08:01

## 2022-04-29 RX ADMIN — PROPRANOLOL HYDROCHLORIDE 20 MG: 20 TABLET ORAL at 16:02

## 2022-04-29 RX ADMIN — SODIUM CHLORIDE 125 ML/HR: 9 INJECTION, SOLUTION INTRAVENOUS at 02:06

## 2022-04-29 RX ADMIN — Medication 10 ML: at 08:01

## 2022-04-29 RX ADMIN — HYDROMORPHONE HYDROCHLORIDE 0.25 MG: 1 INJECTION, SOLUTION INTRAMUSCULAR; INTRAVENOUS; SUBCUTANEOUS at 22:42

## 2022-04-29 RX ADMIN — DOCUSATE SODIUM 100 MG: 100 CAPSULE, LIQUID FILLED ORAL at 19:59

## 2022-04-29 RX ADMIN — DEXTROAMPHETAMINE SACCHARATE, AMPHETAMINE ASPARTATE, DEXTROAMPHETAMINE SULFATE AND AMPHETAMINE SULFATE 7.5 MG: 1.25; 1.25; 1.25; 1.25 TABLET ORAL at 08:01

## 2022-04-29 RX ADMIN — PANTOPRAZOLE SODIUM 40 MG: 40 INJECTION, POWDER, FOR SOLUTION INTRAVENOUS at 06:32

## 2022-04-29 RX ADMIN — ACETAMINOPHEN 650 MG: 325 TABLET ORAL at 23:54

## 2022-04-29 RX ADMIN — CYCLOBENZAPRINE 10 MG: 10 TABLET, FILM COATED ORAL at 08:00

## 2022-04-29 RX ADMIN — CHLORDIAZEPOXIDE HYDROCHLORIDE 25 MG: 25 CAPSULE ORAL at 08:00

## 2022-04-29 RX ADMIN — HYDROMORPHONE HYDROCHLORIDE 0.25 MG: 1 INJECTION, SOLUTION INTRAMUSCULAR; INTRAVENOUS; SUBCUTANEOUS at 16:02

## 2022-04-29 RX ADMIN — PROPRANOLOL HYDROCHLORIDE 20 MG: 20 TABLET ORAL at 08:01

## 2022-04-29 RX ADMIN — ANTACID TABLETS 2 TABLET: 500 TABLET, CHEWABLE ORAL at 10:03

## 2022-04-29 RX ADMIN — VENLAFAXINE HYDROCHLORIDE 150 MG: 150 CAPSULE, EXTENDED RELEASE ORAL at 06:32

## 2022-04-29 RX ADMIN — PANTOPRAZOLE SODIUM 40 MG: 40 TABLET, DELAYED RELEASE ORAL at 16:02

## 2022-04-30 ENCOUNTER — READMISSION MANAGEMENT (OUTPATIENT)
Dept: CALL CENTER | Facility: HOSPITAL | Age: 37
End: 2022-04-30

## 2022-04-30 VITALS
HEIGHT: 69 IN | SYSTOLIC BLOOD PRESSURE: 134 MMHG | TEMPERATURE: 97.8 F | BODY MASS INDEX: 24.44 KG/M2 | DIASTOLIC BLOOD PRESSURE: 79 MMHG | OXYGEN SATURATION: 100 % | HEART RATE: 76 BPM | RESPIRATION RATE: 18 BRPM | WEIGHT: 165 LBS

## 2022-04-30 LAB
ALBUMIN SERPL-MCNC: 4.3 G/DL (ref 3.5–5.2)
ALBUMIN/GLOB SERPL: 1.4 G/DL
ALP SERPL-CCNC: 90 U/L (ref 39–117)
ALT SERPL W P-5'-P-CCNC: 28 U/L (ref 1–41)
ANION GAP SERPL CALCULATED.3IONS-SCNC: 14.2 MMOL/L (ref 5–15)
AST SERPL-CCNC: 30 U/L (ref 1–40)
BILIRUB SERPL-MCNC: 0.4 MG/DL (ref 0–1.2)
BUN SERPL-MCNC: 4 MG/DL (ref 6–20)
BUN/CREAT SERPL: 5 (ref 7–25)
CALCIUM SPEC-SCNC: 9.1 MG/DL (ref 8.6–10.5)
CHLORIDE SERPL-SCNC: 98 MMOL/L (ref 98–107)
CO2 SERPL-SCNC: 26.8 MMOL/L (ref 22–29)
CREAT SERPL-MCNC: 0.8 MG/DL (ref 0.76–1.27)
DEPRECATED RDW RBC AUTO: 44.4 FL (ref 37–54)
EGFRCR SERPLBLD CKD-EPI 2021: 118.4 ML/MIN/1.73
ERYTHROCYTE [DISTWIDTH] IN BLOOD BY AUTOMATED COUNT: 12.7 % (ref 12.3–15.4)
GLOBULIN UR ELPH-MCNC: 3 GM/DL
GLUCOSE SERPL-MCNC: 108 MG/DL (ref 65–99)
HCT VFR BLD AUTO: 38 % (ref 37.5–51)
HGB BLD-MCNC: 12.8 G/DL (ref 13–17.7)
MCH RBC QN AUTO: 32 PG (ref 26.6–33)
MCHC RBC AUTO-ENTMCNC: 33.7 G/DL (ref 31.5–35.7)
MCV RBC AUTO: 95 FL (ref 79–97)
PLATELET # BLD AUTO: 195 10*3/MM3 (ref 140–450)
PMV BLD AUTO: 9.2 FL (ref 6–12)
POTASSIUM SERPL-SCNC: 3.3 MMOL/L (ref 3.5–5.2)
PROT SERPL-MCNC: 7.3 G/DL (ref 6–8.5)
QT INTERVAL: 382 MS
RBC # BLD AUTO: 4 10*6/MM3 (ref 4.14–5.8)
SODIUM SERPL-SCNC: 139 MMOL/L (ref 136–145)
WBC NRBC COR # BLD: 4.87 10*3/MM3 (ref 3.4–10.8)

## 2022-04-30 PROCEDURE — 25010000002 HYDROMORPHONE PER 4 MG: Performed by: INTERNAL MEDICINE

## 2022-04-30 PROCEDURE — 80053 COMPREHEN METABOLIC PANEL: CPT | Performed by: STUDENT IN AN ORGANIZED HEALTH CARE EDUCATION/TRAINING PROGRAM

## 2022-04-30 PROCEDURE — 85027 COMPLETE CBC AUTOMATED: CPT | Performed by: STUDENT IN AN ORGANIZED HEALTH CARE EDUCATION/TRAINING PROGRAM

## 2022-04-30 RX ORDER — PANTOPRAZOLE SODIUM 40 MG/1
40 TABLET, DELAYED RELEASE ORAL
Qty: 60 TABLET | Refills: 0 | Status: SHIPPED | OUTPATIENT
Start: 2022-04-30 | End: 2022-05-30

## 2022-04-30 RX ORDER — OXYCODONE AND ACETAMINOPHEN 7.5; 325 MG/1; MG/1
1 TABLET ORAL EVERY 6 HOURS PRN
Status: DISCONTINUED | OUTPATIENT
Start: 2022-04-30 | End: 2022-04-30 | Stop reason: HOSPADM

## 2022-04-30 RX ORDER — POTASSIUM CHLORIDE 750 MG/1
40 TABLET, FILM COATED, EXTENDED RELEASE ORAL ONCE
Status: COMPLETED | OUTPATIENT
Start: 2022-04-30 | End: 2022-04-30

## 2022-04-30 RX ORDER — OXYCODONE HYDROCHLORIDE AND ACETAMINOPHEN 5; 325 MG/1; MG/1
1 TABLET ORAL EVERY 6 HOURS PRN
Qty: 12 TABLET | Refills: 0 | Status: SHIPPED | OUTPATIENT
Start: 2022-04-30 | End: 2022-05-03

## 2022-04-30 RX ORDER — PSEUDOEPHEDRINE HCL 30 MG
100 TABLET ORAL 2 TIMES DAILY
Qty: 60 CAPSULE | Refills: 0 | Status: SHIPPED | OUTPATIENT
Start: 2022-04-30 | End: 2022-05-30

## 2022-04-30 RX ADMIN — PROPRANOLOL HYDROCHLORIDE 20 MG: 20 TABLET ORAL at 08:57

## 2022-04-30 RX ADMIN — VENLAFAXINE HYDROCHLORIDE 150 MG: 150 CAPSULE, EXTENDED RELEASE ORAL at 06:32

## 2022-04-30 RX ADMIN — HYDROMORPHONE HYDROCHLORIDE 0.25 MG: 1 INJECTION, SOLUTION INTRAMUSCULAR; INTRAVENOUS; SUBCUTANEOUS at 05:05

## 2022-04-30 RX ADMIN — HYDROMORPHONE HYDROCHLORIDE 0.25 MG: 1 INJECTION, SOLUTION INTRAMUSCULAR; INTRAVENOUS; SUBCUTANEOUS at 07:14

## 2022-04-30 RX ADMIN — ANTACID TABLETS 2 TABLET: 500 TABLET, CHEWABLE ORAL at 07:11

## 2022-04-30 RX ADMIN — DEXTROAMPHETAMINE SACCHARATE, AMPHETAMINE ASPARTATE, DEXTROAMPHETAMINE SULFATE AND AMPHETAMINE SULFATE 7.5 MG: 1.25; 1.25; 1.25; 1.25 TABLET ORAL at 08:57

## 2022-04-30 RX ADMIN — OXYCODONE HYDROCHLORIDE AND ACETAMINOPHEN 1 TABLET: 7.5; 325 TABLET ORAL at 09:01

## 2022-04-30 RX ADMIN — HYDROMORPHONE HYDROCHLORIDE 0.25 MG: 1 INJECTION, SOLUTION INTRAMUSCULAR; INTRAVENOUS; SUBCUTANEOUS at 01:55

## 2022-04-30 RX ADMIN — DOCUSATE SODIUM 100 MG: 100 CAPSULE, LIQUID FILLED ORAL at 08:57

## 2022-04-30 RX ADMIN — POTASSIUM CHLORIDE 40 MEQ: 10 TABLET, EXTENDED RELEASE ORAL at 08:58

## 2022-04-30 RX ADMIN — PANTOPRAZOLE SODIUM 40 MG: 40 TABLET, DELAYED RELEASE ORAL at 06:31

## 2022-05-02 NOTE — PROGRESS NOTES
"Enter Query Response Below      Query Response:     - clinically significant hematemesis             If applicable, please update the problem list.         Patient: Mane Gunderson        : 1985  Account: [id number]           Admit Date: 2022        Options to Respond to Query:    1. Access the Encounter     a. From the To-Do Side bar, click Respond With Note.     b. Click New Note     c. Answer query within the yellow box.                d. Update the Problem List if applicable.         This patient admitted with alcohol induced acute on chronic pancreatitis was diagnosed with hematemesis. H&P noted hematemesis was described as\" streaky blood after a few episodes of vomiting.\" Treatment included IV protonix.    Please clarify hematemesis as:    - clinically significant hematemesis  - hematemesis of no clinical significance  - other, please specify_______   - unable to clinically determine    By submitting this query, we are merely seeking further clarification of documentation to accurately reflect all conditions that you are monitoring, evaluating, treating or that extend the hospitalization or utilize additional resources of care. Please utilize your independent clinical judgment when addressing the question(s) above.     This query and your response, once completed, will be entered into the legal medical record.    Sincerely,  Lorelei Gonzalez RN  Clinical Documentation Integrity Program  Ph. [phone]  [email or web address]      "

## 2022-05-02 NOTE — PAYOR COMM NOTE
"Reji Otero (36 y.o. Male)     ATTN: DISCHARGE SUMMARY TO REVIEW: [id number]     DEPT: [phone],  [phone]               Date of Birth   1985    Social Security Number       Address   [address] [place] [address]    Home Phone   [phone]    MRN   [id number]       [employer]ian   Religion    Marital Status   Single                            Admission Date   22    Admission Type   Emergency    Admitting Provider   Henrique Smith MD    Attending Provider       Department, Room/Bed   [address], E648/1       Discharge Date   2022    Discharge Disposition   Home or Self Care    Discharge Destination                               Attending Provider: (none)   Allergies: Haldol [Haloperidol Lactate], Codeine    Isolation: None   Infection: None   Code Status: Prior   Advance Care Planning Activity    Ht: 175.3 cm (69\")   Wt: 74.8 kg (165 lb)    Admission Cmt: None   Principal Problem: 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]                 Active Insurance as of 2022     Primary Coverage     Payor Plan Insurance Group Employer/Plan Group    [employer] PPO [id number]     Payor Plan Address Payor Plan Phone Number Payor Plan Fax Number Effective Dates    [address] [phone]  2018 - None Entered    [place] [address]       Subscriber Name Subscriber Birth Date Member ID       REJI OTERO 1985 [id number]                 Emergency Contacts      (Rel.) Home Phone Work Phone Mobile Phone    Dara Otero (Mother) [phone] -- --             Discharge Summary      Alfredo Torres MD at 22 1029              Patient Name: Reji Otero  : 1985  MRN: [id number]    Date of Admission: 2022  Date of Discharge:  2022  Primary Care Physician: Dominique Ontiveros APRN      Chief Complaint:   Abdominal Pain and Vomiting      Discharge Diagnoses "     Active Hospital Problems    Diagnosis  POA   • **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]  Yes   • 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        Hospital Course        Mr. Gunderson is a 36 y.o. with a history of alcohol abuse, previous pancreatitis pain is long-haul, who presents with a couple day history of abdominal pain as well as nausea and vomiting.  Patient was admitted for alcohol induced acute on chronic pancreatitis..  Started on CIWA protocol for alcohol withdrawals, IV fluids, pain management.  Symptomatically improved, however continues to have abdominal pain tat discharge.  Discharged home on p.o. pain meds for 3 more days.  GI, and psychiatry was consulted.  Patient was counseled on ongoing alcohol use can lead to worsening chronic pancreatitis and eventually may need surgery from complications, which can lead to serious comorbidities including death.  Psychiatry was consulted.  Patient was offered residential or intensive outpatient programming for alcohol use cessation, however patient refused referrals at this point.  Patient is  be able to make his own decisions per psychiatry.  Patient stated that he is going to be living with his parents who is going to provide support to him with alcohol cessation.      Notify your primary care provider if you experience chest pain, difficulty breathing, fevers/chills, nausea or vomiting, bleeding in stool, excessive diarrhea, numbness or weakness or tingling in any part of your body.      Day of Discharge     Subjective:  Patient seen this morning.  Continues to complain of abdominal pain, controlled with complex admission.  No nausea, vomiting.  Able to tolerate p.o. food. Had bowel movement yesterday.       Physical Exam:  Temp:  [97.8 °F (36.6  °C)] 97.8 °F (36.6 °C)  Heart Rate:  [69-86] 76  Resp:  [16-18] 18  BP: (134-152)/() 134/79  Body mass index is 24.37 kg/m².  Physical Exam    General: Alert and oriented x3, no acute distress  HEENT: Normocephalic, atraumatic  CV: Regular rate and rhythm, no murmurs rubs or gallops  Lungs: Clear to auscultation bilaterally, no crackles or wheezes  Abdomen: Soft,nondistended, + tenderness to palpation  Extremities: No significant peripheral edema , no cyanosis     Consultants     Consult Orders (all) (From admission, onward)     Start     Ordered    04/28/22 1620  Inpatient Psychiatrist Consult  Once        Specialty:  Psychiatry  Provider:  Michael Angelo III, MD    04/28/22 1620    04/25/22 1613  Inpatient Gastroenterology Consult  Once        Specialty:  Gastroenterology  Provider:  Oleksandr Dillon MD    04/25/22 1613    04/25/22 1457  Inpatient Access Center Consult  Once        Provider:  (Not yet assigned)    04/25/22 1457    04/25/22 0414  [facility] (on-call MD unless specified) Details  Once,   Status:  Canceled        Specialty:  Hospitalist  Provider:  (Not yet assigned)    04/25/22 0413              Procedures     * Surgery not found *      Imaging Results (All)     Procedure Component Value Units Date/Time    CT Abdomen Pelvis With Contrast [[id number]] Collected: 04/25/22 0408     Updated: 04/25/22 0408    Narrative:        Patient: REJI OTERO  Time Out: 04:07  Exam(s): CT ABDOMEN + PELVIS With Contrast     EXAM:    CT Abdomen and Pelvis With Intravenous Contrast    CLINICAL HISTORY:     Reason for exam: abdominal pain. ETOH Hx. R o complications of   pancreatitis.    TECHNIQUE:    Axial computed tomography images of the abdomen and pelvis with   intravenous contrast.  CTDI is 16.8 mGy and DLP is 875.6 mGy-cm.  This CT   exam was performed according to the principle of ALARA (As Low As   Reasonably Achievable) by using one or more of the following dose   reduction techniques: automated  exposure control, adjustment of the mA   and or kV according to patient size, and or use of iterative   reconstruction technique.    COMPARISON:    September 10, 2021    FINDINGS:    Lung bases:  Unremarkable.  No mass.  No consolidation.     ABDOMEN:    Liver:  Unremarkable.  No mass.    Gallbladder and bile ducts:  Unremarkable.  No calcified stones.  No   ductal dilation.    Pancreas:  There is a moderate to large amount of edema surrounding the   pancreas and extending down over the retroperitoneum consistent with   acute pancreatitis.  No pancreatic necrosis or pseudocyst is identified.    Spleen:  Unremarkable.  No splenomegaly.    Adrenals:  Unremarkable.  No mass.    Kidneys and ureters:  Unremarkable.  No solid mass.  No hydronephrosis.    Stomach and bowel:  Unremarkable.  No obstruction.  No mucosal   thickening.     PELVIS:    Appendix:  Bowel loops are nondilated.  The appendix appears to have   been removed.  No acute inflammatory changes are seen involving the bowel.      Bladder:  Unremarkable.  No mass.    Reproductive:  Unremarkable as visualized.     ABDOMEN and PELVIS:    Intraperitoneal space:  There is fatty infiltration of the liver and   hepatomegaly with the liver measuring 19 cm craniocaudad.  There is a   trace amount of free fluid adjacent to the right liver.  There is a small   amount of free fluid in the pelvis.  No free air.    Bones joints:  No acute fracture.  No dislocation.    Soft tissues:  Unremarkable.    Vasculature:  Unremarkable.  No abdominal aortic aneurysm.    Lymph nodes:  Unremarkable.  No enlarged lymph nodes.    IMPRESSION:         There is a moderate to large amount of edema surrounding the pancreas   and extending down over the retroperitoneum consistent with acute   pancreatitis.  No pancreatic necrosis or pseudocyst is identified.      Impression:          Electronically signed by Glen Yoo MD on 04-25-22 at 0407            Pertinent Labs     Results from last  7 days   Lab Units 04/30/22 0554 04/28/22 2215 04/28/22 0527 04/27/22  0541   WBC 10*3/mm3 4.87 5.45 4.70 6.25   HEMOGLOBIN g/dL 12.8* 12.6* 12.2* 13.0   PLATELETS 10*3/mm3 195 174 178 167     Results from last 7 days   Lab Units 04/30/22 0554 04/28/22 2215 04/28/22 0527 04/27/22  0541   SODIUM mmol/L 139 137 139 137   POTASSIUM mmol/L 3.3* 3.7 2.9* 3.5   CHLORIDE mmol/L 98 105 104 100   CO2 mmol/L 26.8 23.2 23.4 25.8   BUN mg/dL 4* 3* 4* 6   CREATININE mg/dL 0.80 0.69* 0.67* 0.76   GLUCOSE mg/dL 108* 121* 124* 75   Estimated Creatinine Clearance: 136.4 mL/min (by C-G formula based on SCr of 0.8 mg/dL).  Results from last 7 days   Lab Units 04/30/22 0554 04/28/22 2215 04/28/22 0527 04/27/22  0541   ALBUMIN g/dL 4.30 3.60 3.70 3.80   BILIRUBIN mg/dL 0.4 0.5 0.4 0.8   ALK PHOS U/L 90 82 87 98   AST (SGOT) U/L 30 23 20 27   ALT (SGPT) U/L 28 17 19 23     Results from last 7 days   Lab Units 04/30/22 0554 04/28/22 2215 04/28/22 0527 04/27/22  0541 04/26/22  0700 04/25/22  0535 04/25/22  0158   CALCIUM mg/dL 9.1 8.9 8.6 8.7 8.7   < > 9.1   ALBUMIN g/dL 4.30 3.60 3.70 3.80 3.90  --  4.80   MAGNESIUM mg/dL  --   --   --   --  2.3  --  1.3*   PHOSPHORUS mg/dL  --   --   --   --  2.8  --   --     < > = values in this interval not displayed.     Results from last 7 days   Lab Units 04/25/22  0158   LIPASE U/L 789*     Results from last 7 days   Lab Units 04/29/22 2019   TROPONIN T ng/mL <0.010           Invalid input(s): LDLCALC      Results from last 7 days   Lab Units 04/25/22  0310   COVID19  Not Detected       Test Results Pending at Discharge       Discharge Details        Discharge Medications      New Medications      Instructions Start Date   docusate sodium 100 MG capsule   100 mg, Oral, 2 Times Daily, Hold for loose stools.      oxyCODONE-acetaminophen 5-325 MG per tablet  Commonly known as: Percocet   1 tablet, Oral, Every 6 Hours PRN      pantoprazole 40 MG EC tablet  Commonly known as: PROTONIX   40  mg, Oral, 2 Times Daily Before Meals         Continue These Medications      Instructions Start Date   amphetamine-dextroamphetamine 10 MG tablet  Commonly known as: ADDERALL   15 mg, Oral, 2 Times Daily      cyclobenzaprine 10 MG tablet  Commonly known as: FLEXERIL   10 mg, Oral, 3 Times Daily PRN      DEPLIN 7.5 PO   1 capsule, Oral, Daily      hyoscyamine sulfate 0.125 MG tablet dispersible disintegrating tablet  Commonly known as: ANASPAZ   125 mcg, Translingual, Every 4 Hours PRN      mirtazapine 15 MG tablet  Commonly known as: REMERON   15 mg, Oral, Nightly      Pramoxine HCl (Perianal) 1 % foam   Rectal, Every 2 Hours PRN      propranolol 20 MG tablet  Commonly known as: INDERAL   20 mg, Oral, 3 Times Daily      venlafaxine  MG 24 hr capsule  Commonly known as: EFFEXOR-XR   150 mg, Oral, Every Morning             Allergies   Allergen Reactions   • Haldol [Haloperidol Lactate] Other (See Comments)     seizure   • Codeine Itching       Discharge Disposition:  Home or Self Care      Discharge Diet:  No active diet order      Discharge Activity:       CODE STATUS:    Code Status and Medical Interventions:   Ordered at: 04/25/22 0508     Code Status (Patient has no pulse and is not breathing):    CPR (Attempt to Resuscitate)     Medical Interventions (Patient has pulse or is breathing):    Full Support       No future appointments.   Follow-up Information     Dominique Ontiveros APRN Follow up in 1 week(s).    Specialty: Family Medicine  Contact information:  [address] [place] [id number] [phone]                         Time Spent on Discharge:  Greater than 30 minutes      Alfredo Torres MD  [facility]  04/30/22  17:08 EDT        Electronically signed by Alfredo Torres MD at 04/30/22 0173         Janet Terry, RN       Case Management   Case Management/Social Work      Signed   Date of Service:  04/30/22 1020   Creation Time:   04/30/22 1031              Signed                Show:Clear all  []Manual[x]Template[]Copied    Added by:  [x]Janet Terry RN      []Wyattver for details    Case Management Discharge Note        Final Note: Pt discharged home.   CHRISTIANO Terry RN            Selected Continued Care - Discharged on 4/30/2022 Admission date: 4/25/2022 - Discharge disposition: Home or Self Care     Destination    No services have been selected for the patient.                 Durable Medical Equipment    No services have been selected for the patient.                 Dialysis/Infusion    No services have been selected for the patient.                 Home Medical Care    No services have been selected for the patient.                 Therapy    No services have been selected for the patient.                 Community Resources    No services have been selected for the patient.                 Community & DME    No services have been selected for the patient.                      Transportation Services  Private: Car     Final Discharge Disposition Code: 01 - home or self-care

## 2022-05-02 NOTE — PAYOR COMM NOTE
"Mane Otero (36 y.o. Male)     ATTN: CONTINUED STAY CLINICALS FOR  04/29:  [id number]    PLEASE REPLY WITH APPROVED DAYS: [facility] DEPT: [phone],  [phone]              Date of Birth   1985    Social Security Number       Address   [address] [place] [address] [address] [place] [address]    Home Phone   [phone]    MRN   [id number]       Nondenominational   Jainism    Marital Status   Single                            Admission Date   4/25/22    Admission Type   Emergency    Admitting Provider   Henrique Smith MD    Attending Provider       Department, Room/Bed   [address], E648/1       Discharge Date   4/30/2022    Discharge Disposition   Home or Self Care    Discharge Destination                               Attending Provider: (none)   Allergies: Haldol [Haloperidol Lactate], Codeine    Isolation: None   Infection: None   Code Status: Prior   Advance Care Planning Activity    Ht: 175.3 cm (69\")   Wt: 74.8 kg (165 lb)    Admission Cmt: None   Principal Problem: 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]                 Active Insurance as of 4/25/2022     Primary Coverage     Payor Plan Insurance Group Employer/Plan Group    [employer] BLUE [employer]OSS [employer] BLUE [employer] PPO [id number]     Payor Plan Address Payor Plan Phone Number Payor Plan Fax Number Effective Dates    [phone]  8/31/2018 - None Entered    [place] [address]       Subscriber Name Subscriber Birth Date Member ID       MANE OTERO 1985 [id number]                 Emergency Contacts      (Rel.) Home Phone Work Phone Mobile Phone    Dara Otero (Mother) [phone] -- --            Vital Signs (last 3 days) before discharge     Date/Time Temp Temp src Pulse Resp BP Patient Position SpO2    04/30/22 0729 -- -- 76 18 134/79 Sitting --    04/30/22 0713 -- -- 71 -- 142/102 -- 100    04/30/22 0632 -- -- 79 -- 137/91 -- --    04/30/22 0505 -- -- 80 -- 135/91 Sitting " --    04/30/22 0155 -- -- 70 -- 152/102 Lying 98    04/29/22 2243 -- -- 69 16 145/101 Sitting 100    04/29/22 1954 97.8 (36.6) Oral 86 18 149/97 Sitting 100    04/29/22 0700 -- -- 82 16 137/95 Sitting --    04/29/22 0632 -- -- 93 -- -- -- --    04/29/22 0630 -- -- 63 -- 131/103 Sitting 98    04/28/22 2215 97.9 (36.6) Oral 86 16 133/90 Lying --    04/28/22 2038 -- -- 105 -- -- -- --    04/28/22 1907 98.2 (36.8) Oral 83 16 140/96 Lying 100    04/28/22 0900 97.6 (36.4) Oral -- -- 142/95 -- --    04/27/22 2300 -- -- -- -- 131/95 -- --    04/27/22 2100 99.3 (37.4) Oral 96 18 131/95 Sitting 98    04/27/22 1904 98.3 (36.8) Oral 88 20 153/95 Sitting 97    04/27/22 1055 -- -- -- -- 139/92 Lying --    04/27/22 0722 98.8 (37.1) Oral 97 18 166/137  Lying --    BP: RN notified at 04/27/22 0722    04/27/22 0625 -- -- 94 -- 149/98 -- --          Oxygen Therapy (last 3 days) before discharge     Date/Time SpO2 Device (Oxygen Therapy) Flow (L/min) Oxygen Concentration (%) ETCO2 (mmHg)    04/30/22 0713 100 -- -- -- --    04/30/22 0155 98 -- -- -- --    04/29/22 2243 100 room air -- -- --    04/29/22 1954 100 room air -- -- --    04/29/22 1315 -- room air -- -- --    04/29/22 0752 -- room air -- -- --    04/29/22 0630 98 -- -- -- --    04/28/22 2215 -- room air -- -- --    04/28/22 1907 100 room air -- -- --    04/28/22 0918 -- room air -- -- --    04/28/22 0900 -- room air -- -- --    04/28/22 0046 -- room air -- -- --    04/27/22 2300 -- room air -- -- --    04/27/22 2112 -- room air -- -- --    04/27/22 2100 98 room air -- -- --    04/27/22 1904 97 room air -- -- --    04/27/22 1442 -- room air -- -- --    04/27/22 0722 -- room air -- -- --        Intake & Output (last 3 days)       04/29 0701 04/30 0700 04/30 0701 05/01 0700 05/01 0701 05/02 0700 05/02 0701  05/03 0700    P.O. 600       Total Intake(mL/kg) 600 (8)       Net +600               Urine Unmeasured Occurrence 3 x              Medication Administration Report for  Mane Gunderson as of 05/02/22 0719   Legend:    Given Hold Not Given Due Canceled Entry Other Actions    Time Time (Time) Time  Time-Action       Discontinued     Completed     Future     MAR Hold     Linked           Medications 04/28/22 04/29/22 04/30/22   Completed Medications    thiamine (VITAMIN B-1) tablet 100 mg  Dose: 100 mg  Freq: Daily Route: PO  Start: 04/26/22 0900   End: 04/28/22 1427 1427-Given              And  multivitamin (THERAGRAN) tablet 1 tablet  Dose: 1 tablet  Freq: Daily Route: PO  Start: 04/26/22 0900   End: 04/28/22 1427   Admin Instructions:         1427-Given              And  folic acid (FOLVITE) tablet 1 mg  Dose: 1 mg  Freq: Daily Route: PO  Start: 04/26/22 0900   End: 04/28/22 1427 1427-Given               HYDROmorphone (DILAUDID) injection 1 mg  Dose: 1 mg  Freq: Once Route: IV  Start: 04/25/22 0331   End: 04/25/22 0340   Admin Instructions:         Caution: Look alike/sound alike drug alert    If given for pain, use the following pain scale:  Mild Pain = Pain Score of 1-3, CPOT 1-2  Moderate Pain = Pain Score of 4-6, CPOT 3-4  Severe Pain = Pain Score of 7-10, CPOT 5-8          HYDROmorphone (DILAUDID) injection 1 mg  Dose: 1 mg  Freq: Once Route: IV  Start: 04/25/22 0220   End: 04/25/22 0245   Admin Instructions:         Caution: Look alike/sound alike drug alert    If given for pain, use the following pain scale:  Mild Pain = Pain Score of 1-3, CPOT 1-2  Moderate Pain = Pain Score of 4-6, CPOT 3-4  Severe Pain = Pain Score of 7-10, CPOT 5-8          iopamidol (ISOVUE-300) 61 % injection 100 mL  Dose: 100 mL  Freq: Once in Imaging Route: IV  Start: 04/25/22 0305   End: 04/25/22 0304          lactated ringers bolus 1,000 mL  Dose: 1,000 mL  Freq: Once Route: IV  Start: 04/25/22 0417   End: 04/25/22 0624          LORazepam (ATIVAN) injection 2 mg  Dose: 2 mg  Freq: Once Route: IV  Start: 04/25/22 0512   End: 04/25/22 0531   Admin Instructions:      Caution: Look alike/sound  alike drug alert. Dilute 1:1 with normal saline.          LORazepam (ATIVAN) injection 2 mg  Dose: 2 mg  Freq: Once Route: IV  Start: 04/25/22 0209   End: 04/25/22 0211   Admin Instructions:      Caution: Look alike/sound alike drug alert. Dilute 1:1 with normal saline.          magnesium sulfate 4g/100mL (PREMIX) infusion  Dose: 4 g  Freq: Once Route: IV  Start: 04/25/22 1500   End: 04/25/22 2026          ondansetron (ZOFRAN) injection 8 mg  Dose: 8 mg  Freq: Once Route: IV  Start: 04/25/22 0220   End: 04/25/22 0243          potassium chloride (K-DUR,KLOR-CON) ER tablet 40 mEq  Dose: 40 mEq  Freq: Once Route: PO  Start: 04/30/22 0845   End: 04/30/22 0858   Admin Instructions:   Swallow whole; do not crush, split, or chew.      0858-Given             promethazine (PHENERGAN) tablet 25 mg  Dose: 25 mg  Freq: Once Route: PO  Start: 04/25/22 0512   End: 04/25/22 0531   Admin Instructions:               thiamine (B-1) 100 mg, folic acid 1 mg in sodium chloride 0.9 % 1,000 mL infusion  Dose: 1,000 mL/hr  Freq: Once Route: IV  Start: 04/25/22 0211   End: 04/25/22 0423         Discontinued Medications  Medications 04/28/22 04/29/22 04/30/22       acetaminophen (TYLENOL) tablet 650 mg  Dose: 650 mg  Freq: Every 4 Hours PRN Route: PO  PRN Reason: Mild Pain   Start: 04/25/22 0506   End: 04/30/22 1229   Admin Instructions:   Do not exceed 4 grams of acetaminophen in a 24 hr period. Max dose of 2gm for AST/ALT greater than 120 units/L    If given for fever, use fever parameter: fever greater than 100.4 °F.    If given for pain, use the following pain scale:   Mild Pain = Pain Score of 1-3, CPOT 1-2  Moderate Pain = Pain Score of 4-6, CPOT 3-4  Severe Pain = Pain Score of 7-10, CPOT 5-8    1745-Given          6754-Given             Or  acetaminophen (TYLENOL) 160 MG/5ML solution 650 mg  Dose: 650 mg  Freq: Every 4 Hours PRN Route: PO  PRN Reason: Mild Pain   Start: 04/25/22 0506   End: 04/30/22 1229   Admin Instructions:   Do  not exceed 4 grams of acetaminophen in a 24 hr period. Max dose of 2gm for AST/ALT greater than 120 units/L    If given for fever, use fever parameter: fever greater than 100.4 °F.    If given for pain, use the following pain scale:   Mild Pain = Pain Score of 1-3, CPOT 1-2  Moderate Pain = Pain Score of 4-6, CPOT 3-4  Severe Pain = Pain Score of 7-10, CPOT 5-8    1745-Not Given:  See Alt          2354-Not Given:  See Alt             Or  acetaminophen (TYLENOL) suppository 650 mg  Dose: 650 mg  Freq: Every 4 Hours PRN Route: RE  PRN Reason: Mild Pain   Start: 04/25/22 0506   End: 04/30/22 1229   Admin Instructions:   Do not exceed 4 grams of acetaminophen in a 24 hr period. Max dose of 2gm for AST/ALT greater than 120 units/L    If given for fever, use fever parameter: fever greater than 100.4 °F.    If given for pain, use the following pain scale:   Mild Pain = Pain Score of 1-3, CPOT 1-2  Moderate Pain = Pain Score of 4-6, CPOT 3-4  Severe Pain = Pain Score of 7-10, CPOT 5-8    1745-Not Given:  See Alt          2354-Not Given:  See Alt              amphetamine-dextroamphetamine (ADDERALL) tablet 15 mg  Dose: 15 mg  Freq: Every 12 Hours Scheduled Route: PO  Start: 04/25/22 1430   End: 04/25/22 1432   Admin Instructions:               amphetamine-dextroamphetamine (ADDERALL) tablet 7.5 mg  Dose: 7.5 mg  Freq: Daily Route: PO  Start: 04/29/22 0900   End: 04/30/22 1229   Admin Instructions:          0801-Given          0857-Given             amphetamine-dextroamphetamine (ADDERALL) tablet 7.5 mg  Dose: 7.5 mg  Freq: Every 12 Hours Scheduled Route: PO  Start: 04/25/22 2100   End: 04/28/22 1620   Admin Instructions:         (0900)-Not Given               calcium carbonate (TUMS) chewable tablet 500 mg (200 mg elemental)  Dose: 2 tablet  Freq: 2 Times Daily PRN Route: PO  PRN Reason: Heartburn  Start: 04/25/22 0506   End: 04/30/22 1229   Admin Instructions:   One tablet contains 200 mg elemental calcium.  Take with food.      1003-Given          0711-Given             chlordiazePOXIDE (LIBRIUM) capsule 25 mg  Dose: 25 mg  Freq: Nightly Route: PO  Start: 04/29/22 2100   End: 04/29/22 1224   Admin Instructions:      Caution: Look alike/sound alike drug alert.          chlordiazePOXIDE (LIBRIUM) capsule 25 mg  Dose: 25 mg  Freq: 3 Times Daily Route: PO  Start: 04/28/22 2100   End: 04/29/22 1213   Admin Instructions:      Caution: Look alike/sound alike drug alert.    2038-Given          0800-Given              chlordiazePOXIDE (LIBRIUM) capsule 25 mg  Dose: 25 mg  Freq: 3 Times Daily Route: PO  Start: 04/27/22 0830   End: 04/27/22 1348   Admin Instructions:      Caution: Look alike/sound alike drug alert.          chlordiazePOXIDE (LIBRIUM) capsule 50 mg  Dose: 50 mg  Freq: 3 Times Daily Route: PO  Start: 04/27/22 1445   End: 04/28/22 1605   Admin Instructions:      Caution: Look alike/sound alike drug alert.    (0900)-Not Given     1427-Given     1600-Canceled Entry             cyclobenzaprine (FLEXERIL) tablet 10 mg  Dose: 10 mg  Freq: 3 Times Daily PRN Route: PO  PRN Reason: Muscle Spasms  Start: 04/25/22 1334   End: 04/30/22 1229    0120-Given     1746-Given         0800-Given     2227-Given             docusate sodium (COLACE) capsule 100 mg  Dose: 100 mg  Freq: 2 Times Daily Route: PO  Start: 04/26/22 0030   End: 04/30/22 1229   Admin Instructions:   Swallow whole.  Do not open, crush, or chew capsule.    (0900)-Not Given     2037-Given         0801-Given     1959-Given         0857-Given             hydrALAZINE (APRESOLINE) tablet 25 mg  Dose: 25 mg  Freq: Every 6 Hours PRN Route: PO  PRN Comment: SBP over 180 or DBP over 100  Start: 04/25/22 1648   End: 04/30/22 1229   Admin Instructions:   Caution: Look alike/sound alike drug alert          HYDROmorphone (DILAUDID) injection 0.25 mg  Dose: 0.25 mg  Freq: Every 2 Hours PRN Route: IV  PRN Reason: Severe Pain   Start: 04/27/22 1348   End: 04/30/22 0740   Admin Instructions:   If  given for pain, use the following pain scale:  Mild Pain = Pain Score of 1-3, CPOT 1-2  Moderate Pain = Pain Score of 4-6, CPOT 3-4  Severe Pain = Pain Score of 7-10, CPOT 5-8    1426-Given     2057-Given         1055-Given     1253-Given     1602-Given       1950-Given     2242-Given         0155-Given     0505-Given     0714-Given           HYDROmorphone (DILAUDID) injection 0.5 mg  Dose: 0.5 mg  Freq: Every 2 Hours PRN Route: IV  PRN Reason: Severe Pain   Start: 04/25/22 0506   End: 04/27/22 1349   Admin Instructions:   If given for pain, use the following pain scale:  Mild Pain = Pain Score of 1-3, CPOT 1-2  Moderate Pain = Pain Score of 4-6, CPOT 3-4  Severe Pain = Pain Score of 7-10, CPOT 5-8          hyoscyamine sulfate (ANASPAZ) disintegrating tablet 0.125 mg  Dose: 125 mcg  Freq: Every 4 Hours PRN Route: PO  PRN Reason: Cramping  Start: 04/25/22 1334   End: 04/30/22 1229          LORazepam (ATIVAN) tablet 1 mg  Dose: 1 mg  Freq: Every 2 Hours PRN Route: PO  PRN Reason: Withdrawal  PRN Comment: For CIWA-Ar 8-10  Start: 04/25/22 0508   End: 04/30/22 1229   Admin Instructions:   Reassess 2 Hours After Administration     Caution: Look alike/sound alike drug alert    0010-Not Given:  See Alt     0045-Not Given:  See Alt     0138-Not Given:  See Alt       0139-Not Given:  See Alt              Or  LORazepam (ATIVAN) injection 1 mg  Dose: 1 mg  Freq: Every 2 Hours PRN Route: IV  PRN Reason: Withdrawal  PRN Comment: For CIWA-Ar 8-10  Start: 04/25/22 0508   End: 04/30/22 1229   Admin Instructions:   Reassess 2 Hours After Administration     Caution: Look alike/sound alike drug alert. Dilute 1:1 with normal saline.    0010-Not Given:  See Alt     0045-Not Given:  See Alt     0138-Not Given:  See Alt       0139-Not Given:  See Alt              Or  LORazepam (ATIVAN) tablet 2 mg  Dose: 2 mg  Freq: Every 1 Hour PRN Route: PO  PRN Reason: Withdrawal  PRN Comment: For Victor M 11-15  Start: 04/25/22 0508   End: 04/30/22  1229   Admin Instructions:   Reassess 1 Hour After Administration     Caution: Look alike/sound alike drug alert    0010-Not Given:  See Alt     0045-Not Given:  See Alt     0138-Not Given:  See Alt       0139-Not Given:  See Alt              Or  LORazepam (ATIVAN) injection 2 mg  Dose: 2 mg  Freq: Every 1 Hour PRN Route: IV  PRN Reason: Withdrawal  PRN Comment: For CIWA-Ar 11-15  Start: 04/25/22 0508   End: 04/30/22 1229   Admin Instructions:   Reassess 1 Hour After Administration     Caution: Look alike/sound alike drug alert. Dilute 1:1 with normal saline.    0010-Not Given:  See Alt     0045-Not Given:  See Alt     0138-Not Given:  See Alt       0139-Not Given:  See Alt              Or  LORazepam (ATIVAN) injection 2 mg  Dose: 2 mg  Freq: Every 15 Minutes PRN Route: IV  PRN Reason: Anxiety  PRN Comment: For CIWA-Ar Greater Than 15.  Repeat Dose in 15 Minutes if CIWA-Ar Does Not Decrease  Start: 04/25/22 0508   End: 04/30/22 1229   Admin Instructions:   Reassess 15 Minutes After Each Administration.  If CIWA-Ar Does Not Decrease Contact Provider To Discuss Transfer to Higher Level of Care.     Caution: Look alike/sound alike drug alert. Dilute 1:1 with normal saline.    0010-Not Given:  See Alt     0045-Not Given:  See Alt     0138-Given       0139-Not Given:  See Alt              Or  LORazepam (ATIVAN) injection 2 mg  Dose: 2 mg  Freq: Every 15 Minutes PRN Route: IM  PRN Reason: Withdrawal  PRN Comment: If Unable to Administer IV - For CIWA-Ar Greater Than 15.  Repeat Dose in 15 Minutes if CIWA-Ar Does Not Decrease  Start: 04/25/22 0508   End: 04/30/22 1229   Admin Instructions:   Reassess 15 Minutes After Each Administration.  If CIWA-Ar Does Not Decrease Contact Provider To Discuss Transfer to Higher Level of Care.     Caution: Look alike/sound alike drug alert. Dilute 1:1 with normal saline.    0010-Given     0045-Given     0138-Not Given:  See Alt       0139-Canceled Entry               LORazepam (ATIVAN)  injection 2 mg  Dose: 2 mg  Freq: As Needed Route: IV  PRN Reason: Withdrawal  Start: 04/25/22 0209   End: 04/25/22 0400   Admin Instructions:      Caution: Look alike/sound alike drug alert. Dilute 1:1 with normal saline.          mirtazapine (REMERON) tablet 15 mg  Dose: 15 mg  Freq: Nightly Route: PO  Start: 04/25/22 2100   End: 04/30/22 1229 2038-Given          1958-Canceled Entry     1959-Canceled Entry     2000-Given            nitroglycerin (NITROSTAT) SL tablet 0.4 mg  Dose: 0.4 mg  Freq: Every 5 Minutes PRN Route: SL  PRN Reason: Chest Pain  PRN Comment: Only if SBP Greater Than 100  Start: 04/25/22 0506   End: 04/30/22 1229   Admin Instructions:   If Pain Unrelieved After 3 Doses Notify MD          oxyCODONE-acetaminophen (PERCOCET) 7.5-325 MG per tablet 1 tablet  Dose: 1 tablet  Freq: Every 6 Hours PRN Route: PO  PRN Reason: Moderate Pain   Start: 04/30/22 0748   End: 04/30/22 1229   Admin Instructions:   [CHASE]    Do not exceed 4 grams of acetaminophen in a 24 hr period. Max dose of 2gm for AST/ALT greater than 120 units/L        If given for pain, use the following pain scale:   Mild Pain = Pain Score of 1-3, CPOT 1-2  Moderate Pain = Pain Score of 4-6, CPOT 3-4  Severe Pain = Pain Score of 7-10, CPOT 5-8      0901-Given             pantoprazole (PROTONIX) EC tablet 40 mg  Dose: 40 mg  Freq: 2 Times Daily Before Meals Route: PO  Start: 04/29/22 1730   End: 04/30/22 1229   Admin Instructions:   Swallow whole; do not crush, split, or chew.     1602-Given     1730-Canceled Entry         0631-Given             pantoprazole (PROTONIX) injection 40 mg  Dose: 40 mg  Freq: 2 Times Daily Before Meals Route: IV  Indications of Use: Gastrointestinal (GI) Bleed  Start: 04/25/22 1545   End: 04/29/22 1213    0655-Given     1738-Given         0632-Given              potassium & sodium phosphates (PHOS-NAK) 280-160-250 MG packet - for Phosphorus less than 1.25 mg/dL  Dose: 2 packet  Freq: Every 6 Hours PRN Route:  PO  PRN Comment: Phosphorus less than 1.25 mg/dL  Start: 04/25/22 1457   End: 04/30/22 1229   Admin Instructions:   Phosphorus replacement:     If Phos    < 1.25 mg/dL   : Give Neutraphos 2 packets by mouth every 6 hours x 2 doses. Recheck Phosphorus level 6 hours after replacement complete.         Or  potassium & sodium phosphates (PHOS-NAK) 280-160-250 MG packet - for Phosphorus 1.25 - 2.5 mg/dL  Dose: 2 packet  Freq: Every 6 Hours PRN Route: PO  PRN Comment: Phosphorus 1.25 - 2.5 mg/dL  Start: 04/25/22 1457   End: 04/30/22 1229   Admin Instructions:   Phosphorus replacement:       If Phos 1.25 - 2.5  mg/dL: Give Neutraphos 2 packets by mouth every 6 hours x 1 dose. Recheck Phosphorus level 6 hours after replacement complete.          potassium chloride (K-DUR,KLOR-CON) ER tablet 40 mEq  Dose: 40 mEq  Freq: As Needed Route: PO  PRN Comment: Potassium Replacement.  See Admin Instructions  Start: 04/25/22 1457   End: 04/30/22 1229   Admin Instructions:   Potassium 3.1 or Less Give KCl 40 mEq q4h x3 Doses   Potassium 3.2 - 3.6 Give KCl 40 mEq q4h x2 Doses     Check Potassium 4 Hours After Last Dose Given   Check Magnesium if Potassium Level Remains Low After Replacement   DO NOT GIVE if CrCl is Less Than 30 mL/minute or Urine Output Less Than 30 mL/hr  Swallow whole; do not crush, split, or chew.    0918-Not Given:  See Alt     1136-Not Given:  See Alt     1427-Given       1738-Given              Or  potassium chloride (KLOR-CON) packet 40 mEq  Dose: 40 mEq  Freq: As Needed Route: PO  PRN Comment: potassium replacement, see admin instructions  Start: 04/25/22 1457   End: 04/30/22 1229   Admin Instructions:   Potassium 3.1 or Less Give KCl 40 mEq q4h x3 Doses   Potassium 3.2 - 3.6 Give KCl 40 mEq q4h x2 Doses     Check Potassium 4 Hours After Last Dose Given   Check Magnesium if Potassium Level Remains Low After Replacement   DO NOT GIVE if CrCl is Less Than 30 mL/minute or Urine Output Less Than 30 mL/hr    0918-Not  Given:  See Alt     1136-Not Given:  See Alt     1427-Not Given:  See Alt       1738-Not Given:  See Alt              Or  potassium chloride 10 mEq in 100 mL IVPB  Dose: 10 mEq  Freq: Every 1 Hour PRN Route: IV  PRN Comment: Potassium Replacement - See Admin Instructions  Start: 04/25/22 1457   End: 04/30/22 1229   Admin Instructions:   Peripheral or Central IV  Potassium 3.1 or Less Give KCl 10 mEq/100 mL NS IV q1h x6 Doses  Potassium 3.2 - 3.6 Give KCl 10 mEq/100 mL NS q1h x4 Doses    Check Potassium 4 Hours After Last Dose Given  Check Magnesium if Potassium Remains Low After Replacement  DO NOT GIVE if CrCl is Less Than 30 mL/minute or Urine Output Less Than 30 mL/hr.     Rates Greater Than 10 mEq/hr Require ECG Monitoring.  OUTPATIENT/NON-MONITORED UNITS: Potassium Chloride standard bolus infusion rate is a maximum of 10 mEq/hr on unmonitored patients    MONITORED UNITS: Potassium Chloride standard bolus infusion rate is a maximum of 20 mEq/hr on ECG monitored patients ONLY      0918-New Bag     1136-New Bag     1427-Not Given:  See Alt       1738-Not Given:  See Alt               Pramoxine HCl (Perianal) 1 % foam  Freq: Every 2 Hours PRN Route: RE  PRN Reason: Hemorrhoids  Start: 04/25/22 1334   End: 04/30/22 1229   Admin Instructions:                 prochlorperazine (COMPAZINE) injection 5 mg  Dose: 5 mg  Freq: Every 6 Hours PRN Route: IV  PRN Reasons: Nausea,Vomiting  Start: 04/25/22 0507   End: 04/30/22 1229 2051-Given               propranolol (INDERAL) tablet 20 mg  Dose: 20 mg  Freq: 3 Times Daily Route: PO  Start: 04/25/22 1600   End: 04/30/22 1229    (0900)-Not Given     1427-Given     1600-Canceled Entry       2038-Given          0801-Given     1602-Given     1957-Given        0857-Given             sodium chloride 0.9 % flush 10 mL  Dose: 10 mL  Freq: As Needed Route: IV  PRN Reason: Line Care  Start: 04/25/22 0505   End: 04/30/22 1229          sodium chloride 0.9 % flush 10 mL  Dose: 10  mL  Freq: Every 12 Hours Scheduled Route: IV  Start: 04/25/22 0900   End: 04/30/22 1229    1017-Canceled Entry     2039-Given         0801-Given     2000-Given         0900-Canceled Entry             sodium chloride 0.9 % infusion  Rate: 50 mL/hr Dose: 50 mL/hr  Freq: Continuous Route: IV  Start: 04/25/22 0510   End: 04/30/22 1229    0132-New Bag     1738-New Bag         0206-New Bag     1247-Rate/Dose Change     2226-New Bag            venlafaxine XR (EFFEXOR-XR) 24 hr capsule 150 mg  Dose: 150 mg  Freq: Every Morning Route: PO  Start: 04/26/22 0700   End: 04/30/22 1229   Admin Instructions:   Swallow whole; do not crush or chew.    1427-Given          0632-Given          0632-Given                   Lab Results (last 72 hours)     Procedure Component Value Units Date/Time    Comprehensive Metabolic Panel [[id number]]  (Abnormal) Collected: 04/30/22 0554    Specimen: Blood Updated: 04/30/22 0743     Glucose 108 mg/dL      BUN 4 mg/dL      Creatinine 0.80 mg/dL      Sodium 139 mmol/L      Potassium 3.3 mmol/L      Chloride 98 mmol/L      CO2 26.8 mmol/L      Calcium 9.1 mg/dL      Total Protein 7.3 g/dL      Albumin 4.30 g/dL      ALT (SGPT) 28 U/L      AST (SGOT) 30 U/L      Alkaline Phosphatase 90 U/L      Total Bilirubin 0.4 mg/dL      Globulin 3.0 gm/dL      A/G Ratio 1.4 g/dL      BUN/Creatinine Ratio 5.0     Anion Gap 14.2 mmol/L      eGFR 118.4 mL/min/1.73      Comment: National Kidney Foundation and American Society of Nephrology (ASN) Task Force recommended calculation based on the Chronic Kidney Disease Epidemiology Collaboration (CKD-EPI) equation refit without adjustment for race.       Narrative:      GFR Normal >60  Chronic Kidney Disease <60  Kidney Failure <15      CBC (No Diff) [[id number]]  (Abnormal) Collected: 04/30/22 0554    Specimen: Blood Updated: 04/30/22 0702     WBC 4.87 10*3/mm3      RBC 4.00 10*6/mm3      Hemoglobin 12.8 g/dL      Hematocrit 38.0 %      MCV 95.0 fL      MCH 32.0 pg       MCHC 33.7 g/dL      RDW 12.7 %      RDW-SD 44.4 fl      MPV 9.2 fL      Platelets 195 10*3/mm3     Troponin [[id number]]  (Normal) Collected: 04/29/22 2019    Specimen: Blood Updated: 04/29/22 2109     Troponin T <0.010 ng/mL     Narrative:      Troponin T Reference Range:  <= 0.03 ng/mL-   Negative for AMI  >0.03 ng/mL-     Abnormal for myocardial necrosis.  Clinicians would have to utilize clinical acumen, EKG, Troponin and serial changes to determine if it is an Acute Myocardial Infarction or myocardial injury due to an underlying chronic condition.       Results may be falsely decreased if patient taking Biotin.      Comprehensive Metabolic Panel [[id number]]  (Abnormal) Collected: 04/28/22 2215    Specimen: Blood Updated: 04/28/22 2306     Glucose 121 mg/dL      BUN 3 mg/dL      Creatinine 0.69 mg/dL      Sodium 137 mmol/L      Potassium 3.7 mmol/L      Chloride 105 mmol/L      CO2 23.2 mmol/L      Calcium 8.9 mg/dL      Total Protein 6.4 g/dL      Albumin 3.60 g/dL      ALT (SGPT) 17 U/L      AST (SGOT) 23 U/L      Alkaline Phosphatase 82 U/L      Total Bilirubin 0.5 mg/dL      Globulin 2.8 gm/dL      A/G Ratio 1.3 g/dL      BUN/Creatinine Ratio 4.3     Anion Gap 8.8 mmol/L      eGFR 123.8 mL/min/1.73      Comment: National Kidney Foundation and American Society of Nephrology (ASN) Task Force recommended calculation based on the Chronic Kidney Disease Epidemiology Collaboration (CKD-EPI) equation refit without adjustment for race.       Narrative:      GFR Normal >60  Chronic Kidney Disease <60  Kidney Failure <15      CBC (No Diff) [[id number]]  (Abnormal) Collected: 04/28/22 2215    Specimen: Blood Updated: 04/28/22 2224     WBC 5.45 10*3/mm3      RBC 3.97 10*6/mm3      Hemoglobin 12.6 g/dL      Hematocrit 36.3 %      MCV 91.4 fL      MCH 31.7 pg      MCHC 34.7 g/dL      RDW 13.1 %      RDW-SD 43.7 fl      MPV 8.8 fL      Platelets 174 10*3/mm3     Comprehensive Metabolic Panel [[id number]]  (Abnormal)  Collected: 04/28/22 0527    Specimen: Blood Updated: 04/28/22 0711     Glucose 124 mg/dL      BUN 4 mg/dL      Creatinine 0.67 mg/dL      Sodium 139 mmol/L      Potassium 2.9 mmol/L      Chloride 104 mmol/L      CO2 23.4 mmol/L      Calcium 8.6 mg/dL      Total Protein 6.6 g/dL      Albumin 3.70 g/dL      ALT (SGPT) 19 U/L      AST (SGOT) 20 U/L      Alkaline Phosphatase 87 U/L      Total Bilirubin 0.4 mg/dL      Globulin 2.9 gm/dL      A/G Ratio 1.3 g/dL      BUN/Creatinine Ratio 6.0     Anion Gap 11.6 mmol/L      eGFR 124.9 mL/min/1.73      Comment: National Kidney Foundation and American Society of Nephrology (ASN) Task Force recommended calculation based on the Chronic Kidney Disease Epidemiology Collaboration (CKD-EPI) equation refit without adjustment for race.       Narrative:      GFR Normal >60  Chronic Kidney Disease <60  Kidney Failure <15      CBC (No Diff) [[id number]]  (Abnormal) Collected: 04/28/22 0527    Specimen: Blood Updated: 04/28/22 0642     WBC 4.70 10*3/mm3      RBC 3.80 10*6/mm3      Hemoglobin 12.2 g/dL      Hematocrit 33.9 %      MCV 89.2 fL      MCH 32.1 pg      MCHC 36.0 g/dL      RDW 13.0 %      RDW-SD 42.0 fl      MPV 9.3 fL      Platelets 178 10*3/mm3           Orders (last 72 hrs)      Start     Ordered    04/30/22 0845  potassium chloride (K-DUR,KLOR-CON) ER tablet 40 mEq  Once         04/30/22 0745    04/30/22 0843  Discharge patient  Once         04/30/22 0847    04/30/22 0748  oxyCODONE-acetaminophen (PERCOCET) 7.5-325 MG per tablet 1 tablet  Every 6 Hours PRN,   Status:  Discontinued         04/30/22 0749    04/30/22 0600  Comprehensive Metabolic Panel  Morning Draw         04/29/22 1903    04/30/22 0600  CBC (No Diff)  Daily,   Status:  Canceled       04/29/22 1903    04/30/22 0000  docusate sodium 100 MG capsule  2 Times Daily         04/30/22 0847    04/30/22 0000  pantoprazole (PROTONIX) 40 MG EC tablet  2 Times Daily Before Meals         04/30/22 0847    04/30/22 0000   oxyCODONE-acetaminophen (Percocet) 5-325 MG per tablet  Every 6 Hours PRN         04/30/22 0847    04/29/22 2100  chlordiazePOXIDE (LIBRIUM) capsule 25 mg  Nightly,   Status:  Discontinued         04/29/22 1213    04/29/22 1954  ECG 12 Lead  STAT         04/29/22 1953 04/29/22 1953  Troponin  STAT         04/29/22 1953 04/29/22 1730  pantoprazole (PROTONIX) EC tablet 40 mg  2 Times Daily Before Meals,   Status:  Discontinued         04/29/22 1213    04/29/22 0900  amphetamine-dextroamphetamine (ADDERALL) tablet 7.5 mg  Daily,   Status:  Discontinued         04/28/22 1620    04/29/22 0842  Diet Regular; Low Fat  Diet Effective Now,   Status:  Canceled         04/29/22 0841    04/28/22 2100  chlordiazePOXIDE (LIBRIUM) capsule 25 mg  3 Times Daily,   Status:  Discontinued         04/28/22 1605    04/27/22 1348  HYDROmorphone (DILAUDID) injection 0.25 mg  Every 2 Hours PRN,   Status:  Discontinued         04/27/22 1349    04/27/22 1000  Strict Intake & Output  Every Hour,   Status:  Canceled       04/27/22 0915    04/26/22 0700  venlafaxine XR (EFFEXOR-XR) 24 hr capsule 150 mg  Every Morning,   Status:  Discontinued         04/25/22 1334    04/26/22 0030  docusate sodium (COLACE) capsule 100 mg  2 Times Daily,   Status:  Discontinued         04/25/22 2336    04/25/22 2100  mirtazapine (REMERON) tablet 15 mg  Nightly,   Status:  Discontinued         04/25/22 1334    04/25/22 1800  Strict Intake & Output  Every Hour,   Status:  Canceled       04/25/22 1702    04/25/22 1648  hydrALAZINE (APRESOLINE) tablet 25 mg  Every 6 Hours PRN,   Status:  Discontinued         04/25/22 1648    04/25/22 1600  propranolol (INDERAL) tablet 20 mg  3 Times Daily,   Status:  Discontinued         04/25/22 1334    04/25/22 1545  pantoprazole (PROTONIX) injection 40 mg  2 Times Daily Before Meals,   Status:  Discontinued         04/25/22 1451    04/25/22 1458  Patient Currently On Electrolyte Replacement Protocol - Please Refer to MAR for  "Protocol Details  Misc Nursing Order (Specify)  Daily,   Status:  Canceled      Comments: Patient Currently On Electrolyte Replacement Protocol - Please Refer to MAR for Protocol Details    04/25/22 1457 04/25/22 1457  potassium & sodium phosphates (PHOS-NAK) 280-160-250 MG packet - for Phosphorus less than 1.25 mg/dL  Every 6 Hours PRN,   Status:  Discontinued        \"Or\" Linked Group Details    04/25/22 1457 04/25/22 1457  potassium & sodium phosphates (PHOS-NAK) 280-160-250 MG packet - for Phosphorus 1.25 - 2.5 mg/dL  Every 6 Hours PRN,   Status:  Discontinued        \"Or\" Linked Group Details    04/25/22 1457 04/25/22 1457  potassium chloride (K-DUR,KLOR-CON) ER tablet 40 mEq  As Needed,   Status:  Discontinued        \"Or\" Linked Group Details    04/25/22 1457 04/25/22 1457  potassium chloride (KLOR-CON) packet 40 mEq  As Needed,   Status:  Discontinued        \"Or\" Linked Group Details    04/25/22 1457 04/25/22 1457  potassium chloride 10 mEq in 100 mL IVPB  Every 1 Hour PRN,   Status:  Discontinued        \"Or\" Linked Group Details    04/25/22 1457    04/25/22 1457  Magnesium  As Needed,   Status:  Canceled       04/25/22 1457 04/25/22 1457  Potassium  As Needed,   Status:  Canceled       04/25/22 1457 04/25/22 1334  Pramoxine HCl (Perianal) 1 % foam  Every 2 Hours PRN,   Status:  Discontinued         04/25/22 1334 04/25/22 1334  hyoscyamine sulfate (ANASPAZ) disintegrating tablet 0.125 mg  Every 4 Hours PRN,   Status:  Discontinued         04/25/22 1334 04/25/22 1334  cyclobenzaprine (FLEXERIL) tablet 10 mg  3 Times Daily PRN,   Status:  Discontinued         04/25/22 1334 04/25/22 0900  sodium chloride 0.9 % flush 10 mL  Every 12 Hours Scheduled,   Status:  Discontinued         04/25/22 0508    04/25/22 0800  Vital Signs  Every 4 Hours,   Status:  Canceled       04/25/22 0508 04/25/22 0510  sodium chloride 0.9 % infusion  Continuous,   Status:  Discontinued         04/25/22 0508 " "   04/25/22 0508  LORazepam (ATIVAN) tablet 1 mg  Every 2 Hours PRN,   Status:  Discontinued        \"Or\" Linked Group Details    04/25/22 0509 04/25/22 0508  LORazepam (ATIVAN) injection 1 mg  Every 2 Hours PRN,   Status:  Discontinued        \"Or\" Linked Group Details    04/25/22 0509 04/25/22 0508  LORazepam (ATIVAN) tablet 2 mg  Every 1 Hour PRN,   Status:  Discontinued        \"Or\" Linked Group Details    04/25/22 0509 04/25/22 0508  LORazepam (ATIVAN) injection 2 mg  Every 1 Hour PRN,   Status:  Discontinued        \"Or\" Linked Group Details    04/25/22 0509 04/25/22 0508  LORazepam (ATIVAN) injection 2 mg  Every 15 Minutes PRN,   Status:  Discontinued        \"Or\" Linked Group Details    04/25/22 0509 04/25/22 0508  LORazepam (ATIVAN) injection 2 mg  Every 15 Minutes PRN,   Status:  Discontinued        \"Or\" Linked Group Details    04/25/22 0509 04/25/22 0507  prochlorperazine (COMPAZINE) injection 5 mg  Every 6 Hours PRN,   Status:  Discontinued         04/25/22 0508 04/25/22 0506  calcium carbonate (TUMS) chewable tablet 500 mg (200 mg elemental)  2 Times Daily PRN,   Status:  Discontinued         04/25/22 0508 04/25/22 0506  acetaminophen (TYLENOL) tablet 650 mg  Every 4 Hours PRN,   Status:  Discontinued        \"Or\" Linked Group Details    04/25/22 0508 04/25/22 0506  acetaminophen (TYLENOL) 160 MG/5ML solution 650 mg  Every 4 Hours PRN,   Status:  Discontinued        \"Or\" Linked Group Details    04/25/22 0508 04/25/22 0506  acetaminophen (TYLENOL) suppository 650 mg  Every 4 Hours PRN,   Status:  Discontinued        \"Or\" Linked Group Details    04/25/22 0508 04/25/22 0506  Oxygen Therapy- Nasal Cannula; Titrate for SPO2: 90% - 95%  Continuous PRN,   Status:  Canceled      Comments: If Patient Develops Unresponsiveness, Acute Dyspnea, Cyanosis or Suspected Hypoxemia Start Continuous Pulse Ox Monitoring, Apply Oxygen & Notify Provider    04/25/22 0508 04/25/22 0506  " nitroglycerin (NITROSTAT) SL tablet 0.4 mg  Every 5 Minutes PRN,   Status:  Discontinued         22 0508    22 0506  ECG 12 Lead  As Needed,   Status:  Canceled      Comments: Nurse to Release if Patient Expericences Acute Chest Pain or Dysrhythmias    22 0508    22 0506  Potassium  As Needed,   Status:  Canceled      Comments: For Ventricular Arrhythmias      22 0508    22 0506  Magnesium  As Needed,   Status:  Canceled      Comments: For Ventricular Arrhythmias      22 0508    22 0506  Troponin  As Needed,   Status:  Canceled      Comments: For Chest Pain      22 0508    22 0506  Blood Gas, Arterial -  As Needed,   Status:  Canceled      Comments: Per O2 PolicyNotify Physician      22 0508    22 0506  Intake & Output  Every Shift,   Status:  Canceled       22 0508    22 0505  sodium chloride 0.9 % flush 10 mL  As Needed,   Status:  Discontinued         22 0508    --  propranolol (INDERAL) 20 MG tablet  3 Times Daily         22 0641    --  hyoscyamine sulfate (ANASPAZ) 0.125 MG tablet dispersible disintegrating tablet  Every 4 Hours PRN         22 0641    --  venlafaxine XR (EFFEXOR-XR) 150 MG 24 hr capsule  Every Morning         22 0641    --  SCANNED - TELEMETRY           22 0000    --  SCANNED - TELEMETRY           22 0000                   Physician Progress Notes (last 72 hours)      Henrique Smith MD at 22 1210              Name: Mane Gunderson ADMIT: 2022   : 1985  PCP: Dominique Ontiveros APRN    MRN: [id number] LOS: 4 days   AGE/SEX: 36 y.o. male  ROOM: [facility]/   Subjective   Chief Complaint   Patient presents with   • Abdominal Pain   • Vomiting     Pain is better  Tolerating liquids  On IVFs  More severe w/d the past 2 days  +delirium and hallucinations  These are resolved today  On ativan and librium    ROS  No f/c  No n/v  No cp/palp  No soa/cough    Objective    Vital Signs  Temp:  [97.9 °F (36.6 °C)-98.2 °F (36.8 °C)] 97.9 °F (36.6 °C)  Heart Rate:  [] 82  Resp:  [16] 16  BP: (131-140)/() 137/95  SpO2:  [98 %-100 %] 98 %  on   ;   Device (Oxygen Therapy): room air  Body mass index is 24.37 kg/m².    Ill appearing  Confused  drowsy  Physical Exam  Constitutional:       Appearance: He is not ill-appearing.   HENT:      Head: Normocephalic and atraumatic.   Eyes:      General: No scleral icterus.  Cardiovascular:      Rate and Rhythm: Normal rate and regular rhythm.      Heart sounds: Normal heart sounds.   Pulmonary:      Effort: Pulmonary effort is normal. No respiratory distress.      Breath sounds: Normal breath sounds.   Abdominal:      General: There is no distension.      Palpations: Abdomen is soft.      Tenderness: There is no abdominal tenderness.   Musculoskeletal:      Cervical back: Neck supple.   Neurological:      Mental Status: He is alert.         Results Review:       I reviewed the patient's new clinical results.  Results from last 7 days   Lab Units 04/28/22 2215 04/28/22 0527 04/27/22  0541 04/26/22  0700   WBC 10*3/mm3 5.45 4.70 6.25 7.45   HEMOGLOBIN g/dL 12.6* 12.2* 13.0 13.8   PLATELETS 10*3/mm3 174 178 167 170     Results from last 7 days   Lab Units 04/28/22 2215 04/28/22 0527 04/27/22  0541 04/26/22  0700   SODIUM mmol/L 137 139 137 135*   POTASSIUM mmol/L 3.7 2.9* 3.5 4.7   CHLORIDE mmol/L 105 104 100 99   CO2 mmol/L 23.2 23.4 25.8 26.0   BUN mg/dL 3* 4* 6 6   CREATININE mg/dL 0.69* 0.67* 0.76 0.73*   GLUCOSE mg/dL 121* 124* 75 86   Estimated Creatinine Clearance: 158.1 mL/min (A) (by C-G formula based on SCr of 0.69 mg/dL (L)).  Results from last 7 days   Lab Units 04/28/22 2215 04/28/22 0527 04/27/22  0541 04/26/22  0700   ALBUMIN g/dL 3.60 3.70 3.80 3.90   BILIRUBIN mg/dL 0.5 0.4 0.8 1.4*   ALK PHOS U/L 82 87 98 106   AST (SGOT) U/L 23 20 27 29   ALT (SGPT) U/L 17 19 23 26     Results from last 7 days   Lab Units  04/28/22  2215 04/28/22  0527 04/27/22  0541 04/26/22  0700 04/25/22  0535 04/25/22  0158   CALCIUM mg/dL 8.9 8.6 8.7 8.7   < > 9.1   ALBUMIN g/dL 3.60 3.70 3.80 3.90  --  4.80   MAGNESIUM mg/dL  --   --   --  2.3  --  1.3*   PHOSPHORUS mg/dL  --   --   --  2.8  --   --     < > = values in this interval not displayed.         Coag     HbA1C   Lab Results   Component Value Date    HGBA1C 4.8 03/29/2022    HGBA1C 4.7 02/19/2019    HGBA1C 4.4 05/25/2018     Infection     Radiology(recent) No radiology results for the last day  No results found for: TROPONINT, TROPONINI, BNP  No components found for: TSH;2    amphetamine-dextroamphetamine, 7.5 mg, Oral, Daily  chlordiazePOXIDE, 25 mg, Oral, TID  docusate sodium, 100 mg, Oral, BID  mirtazapine, 15 mg, Oral, Nightly  pantoprazole, 40 mg, Intravenous, BID AC  propranolol, 20 mg, Oral, TID  sodium chloride, 10 mL, Intravenous, Q12H  venlafaxine XR, 150 mg, Oral, QAM      sodium chloride, 125 mL/hr, Last Rate: 125 mL/hr (04/29/22 0206)    Diet Regular; Low Fat      Assessment/Plan     Active Hospital Problems    Diagnosis  POA   • **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]  Yes   • 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         · IVFs- can decrease, advance diet  · Counseling on alcohol cessation  · PRN agents for nausea and pain  · Ativan based on ciwa protocol for alcohol w/d. Withdrawal worsened 2 days agowith delirium requiring sitter and more ativan. Better today with resolution of hallucinations    · Electrolyte replacement  · His hematemesis was just streaky blood after a few episodes of vomiting.  We will have on IV PPI and no recurrence  · Will consult Psychiatry with his history of depression, possible bipolar, possible ADHD as he is on multiple  Psychiatric medications including Adderall (have decreased this with his agitation)    VTE Prophylaxis - SCDs. Also is ambulating well  Code Status - Full code.      DW RN  DW Dr. Dillon  DW his mother on the phone (on )      Greater than 36 minutes spent with greater than 50% counseling and coordinating care    Dispo- suspect will be medically stable for discharge tomorrow     Henrique Smith MD  [facility]  22  13:11 EDT    Electronically signed by Henrique Smith MD at 22 1212     Oleksandr Dillon MD at 22 0941           [facility]     Progress Note    Patient Name: Mane Gunderson  : 1985  MRN: [id number]  Primary Care Physician:  Dominique Ontiveros APRN  Date of admission: 2022    Subjective   Subjective     Chief Complaint: pancreatitis         Patient Reports feeling better, hungry for food. Little or no abdominal pain     Review of Systems   Neurological: Positive for weakness.   Psychiatric/Behavioral: Positive for decreased concentration.       Objective   Objective     Vitals:   Temp:  [97.9 °F (36.6 °C)-98.2 °F (36.8 °C)] 97.9 °F (36.6 °C)  Heart Rate:  [] 82  Resp:  [16] 16  BP: (131-140)/() 137/95    Physical Exam  HENT:      Right Ear: External ear normal.      Left Ear: External ear normal.      Mouth/Throat:      Pharynx: Oropharynx is clear.   Eyes:      Conjunctiva/sclera: Conjunctivae normal.   Cardiovascular:      Rate and Rhythm: Normal rate.      Pulses: Normal pulses.   Pulmonary:      Effort: Pulmonary effort is normal.   Abdominal:      General: Abdomen is flat.   Skin:     General: Skin is warm.   Neurological:      General: No focal deficit present.      Mental Status: He is alert.   Psychiatric:         Mood and Affect: Mood normal.          Result Review    Result Review:  I have personally reviewed the results from the time of this admission to 2022 09:41 EDT and agree with these findings:  []   Laboratory  []  Microbiology  []  Radiology  []  EKG/Telemetry   []  Cardiology/Vascular   []  Pathology  []  Old records  []  Other:      Assessment/Plan   Assessment / Plan     Brief Patient Summary:  Mane Gunderson is a 36 y.o. male   Acute etoh pancreatitis  etoh abuse  Abdominal pain  Constipation consider possible pelvic floor dysfunction , irritable bowel syndrome      Active Hospital Problems:  Active Hospital Problems    Diagnosis    • **Alcohol-induced acute pancreatitis    • Alcohol withdrawal syndrome with perceptual disturbance (HCC)    • Hypokalemia    • Hypomagnesemia    • Hematemesis with nausea      Plan: Agree with iv fluids and supportive care for pancreatitis  Patient is aware continue etoh use could lead to chronic pancreatitis,  Necrosis of pancreas possibly requiring surgery and having potentially deadly complications  I urge patient to continue treatment with therapist and any formal etoh addiction program  Available  Advance diet to regular low fat.    Given the defecatory issues, plan anorectal mannometry as outpatient after discharge.         DVT prophylaxis:  Mechanical DVT prophylaxis orders are present.    CODE STATUS:    Code Status (Patient has no pulse and is not breathing): CPR (Attempt to Resuscitate)  Medical Interventions (Patient has pulse or is breathing): Full Support    Disposition:  I expect patient to be discharged soon    Oleksandr Dillon MD             Electronically signed by Oleksandr Dillon MD at 04/29/22 1201          Consult Notes (last 72 hours)      Michael Angelo III, MD at 04/29/22 1217      Consult Orders    1. Inpatient Psychiatrist Consult [[id number]] ordered by Henrique Smith MD at 04/28/22 1620               IDENTIFYING INFORMATION: The patient is a 36-year-old white male with a history of alcohol dependence admitted with alcohol induced pancreatitis    CHIEF COMPLAINT: None given    INFORMANT: Patient and chart    RELIABILITY:  "Limited    HISTORY OF PRESENT ILLNESS: The patient is a 36-year-old white male with a history of alcohol dependence.  He has been admitted previously with alcohol induced pancreatitis and presented on 4/25/2022 with recurrence of this condition related to his use of alcohol which he quantifies at approximately 1 pint on a daily basis.  The patient also admits to abuse of cannabis.  The patient reports that he drinks to relieve abdominal pain.  He reports that he is under the care of a psychiatric professional as well as a chemical dependence counselor in the chart indicates that he has been prescribed Adderall, Deplin, Remeron, Inderal and Effexor XR.  He reports a history of 1 previous inpatient treatment course related to his chemical dependence but stayed for only 2 weeks of the program.  He is in significant denial of the significant health risks involved in his ongoing use of alcohol and states that \"I can quit whenever I want\".  He expresses no interest in residential or intensive outpatient chemical dependency treatment.  He currently has a sitter in his room as he had attempted to elope from the hospital earlier during this hospitalization.  He is fully aware of the potential risks of leaving the hospital prematurely during today's interview including the risk of death from untreated pancreatitis.  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  He describes himself as being on the autistic spectrum.    PAST PSYCHIATRIC HISTORY: As above.    PAST MEDICAL HISTORY: Significant for pancreatitis as previously described    MEDICATIONS:   Medications Prior to Admission   Medication Sig Dispense Refill Last Dose   • amphetamine-dextroamphetamine (ADDERALL) 10 MG tablet Take 15 mg by mouth 2 (Two) Times a Day.   4/24/2022 at Unknown time   • cyclobenzaprine (FLEXERIL) 10 MG tablet Take 10 mg by mouth 3 (Three) Times a Day As Needed for Muscle Spasms.   Past Month at Unknown " "time   • hyoscyamine sulfate (ANASPAZ) 0.125 MG tablet dispersible disintegrating tablet Place 125 mcg on the tongue Every 4 (Four) Hours As Needed.   4/24/2022 at Unknown time   • L-Methylfolate-Algae (DEPLIN 7.5 PO) Take 1 capsule by mouth Daily.   4/24/2022 at Unknown time   • mirtazapine (REMERON) 15 MG tablet Take 15 mg by mouth Every Night.   Past Week at Unknown time   • propranolol (INDERAL) 20 MG tablet Take 20 mg by mouth 3 (Three) Times a Day.   4/24/2022 at Unknown time   • venlafaxine XR (EFFEXOR-XR) 150 MG 24 hr capsule Take 150 mg by mouth Every Morning.   4/24/2022 at Unknown time   • Pramoxine HCl, Perianal, 1 % foam Insert  into the rectum Every 2 (Two) Hours As Needed for Hemorrhoids.   Unknown at Unknown time         ALLERGIES: Haldol codeine    FAMILY HISTORY: The patient reports an extensive family history of alcohol dependence    SOCIAL HISTORY: The patient states that he lives with his parents.  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  He is never  and has no children.  His substance use history as previously described.    MENTAL STATUS EXAM: The patient is a well-developed well-nourished white male appearing his stated age.  He is in no apparent physical distress at the time examination.  He is awake alert and oriented in all spheres.  His mood is calm his affect blunted and odd.  Speech is relevant and coherent.  There are no deficits memory or cognition noted.  The patient denies current suicidal or homicidal ideation or psychotic symptoms though there has seemed to be a tendency of delusional grandiosity present during interview.  His judgment and insight are reasonably intact.    ASSETS/LIABILITIES: To be assessed    DIAGNOSTIC IMPRESSION: Alcohol use disorder, autism spectrum disorder by history, depressive disorder unspecified, alcoholic pancreatitis    PLAN: I have discontinued the patient's chlordiazepoxide to avoid benzodiazepine polypharmacy.  I would " recommend continuation of the lorazepam based detoxification protocol.  As noted previously, the patient expresses no interest in referral for residential or intensive outpatient programming.  He is firmly confronted today regarding the potential harm that ongoing abuse of alcohol will have on his future health.  If he has a change of heart regarding this, I will be happy to see him again.  I do believe he is capable of making an informed decision regarding leaving the hospital given his acknowledgment of the potential consequences of doing so and will therefore recommend that his sitter be discontinued.     Electronically signed by Michael Angelo III, MD at 04/29/22 2312

## 2022-05-16 ENCOUNTER — OFFICE (OUTPATIENT)
Dept: URBAN - METROPOLITAN AREA CLINIC 66 | Facility: CLINIC | Age: 37
End: 2022-05-16

## 2022-05-16 VITALS
HEIGHT: 69 IN | DIASTOLIC BLOOD PRESSURE: 81 MMHG | WEIGHT: 158 LBS | SYSTOLIC BLOOD PRESSURE: 121 MMHG | HEART RATE: 85 BPM

## 2022-05-16 DIAGNOSIS — K59.00 CONSTIPATION, UNSPECIFIED: ICD-10-CM

## 2022-05-16 DIAGNOSIS — R19.7 DIARRHEA, UNSPECIFIED: ICD-10-CM

## 2022-05-16 DIAGNOSIS — R14.0 ABDOMINAL DISTENSION (GASEOUS): ICD-10-CM

## 2022-05-16 PROCEDURE — 99215 OFFICE O/P EST HI 40 MIN: CPT | Performed by: NURSE PRACTITIONER

## 2022-08-16 ENCOUNTER — OFFICE (OUTPATIENT)
Dept: URBAN - METROPOLITAN AREA CLINIC 66 | Facility: CLINIC | Age: 37
End: 2022-08-16
Payer: COMMERCIAL

## 2022-08-16 VITALS
SYSTOLIC BLOOD PRESSURE: 120 MMHG | HEART RATE: 111 BPM | DIASTOLIC BLOOD PRESSURE: 84 MMHG | HEIGHT: 69 IN | WEIGHT: 175 LBS

## 2022-08-16 DIAGNOSIS — K59.00 CONSTIPATION, UNSPECIFIED: ICD-10-CM

## 2022-08-16 PROCEDURE — 99215 OFFICE O/P EST HI 40 MIN: CPT | Performed by: NURSE PRACTITIONER

## 2023-06-14 ENCOUNTER — HOSPITAL ENCOUNTER (INPATIENT)
Facility: HOSPITAL | Age: 38
LOS: 4 days | Discharge: HOME | DRG: 432 | End: 2023-06-18
Attending: EMERGENCY MEDICINE | Admitting: INTERNAL MEDICINE
Payer: COMMERCIAL

## 2023-06-14 ENCOUNTER — APPOINTMENT (OUTPATIENT)
Dept: CT IMAGING | Facility: HOSPITAL | Age: 38
DRG: 432 | End: 2023-06-14
Payer: COMMERCIAL

## 2023-06-14 ENCOUNTER — APPOINTMENT (OUTPATIENT)
Dept: GENERAL RADIOLOGY | Facility: HOSPITAL | Age: 38
DRG: 432 | End: 2023-06-14
Payer: COMMERCIAL

## 2023-06-14 DIAGNOSIS — H81.10 BENIGN PAROXYSMAL POSITIONAL VERTIGO, UNSPECIFIED LATERALITY: ICD-10-CM

## 2023-06-14 DIAGNOSIS — K70.10 ALCOHOLIC HEPATITIS, UNSPECIFIED WHETHER ASCITES PRESENT: Primary | ICD-10-CM

## 2023-06-14 DIAGNOSIS — K86.1 ACUTE ON CHRONIC PANCREATITIS: ICD-10-CM

## 2023-06-14 DIAGNOSIS — K85.90 ACUTE ON CHRONIC PANCREATITIS: ICD-10-CM

## 2023-06-14 LAB
ALBUMIN SERPL-MCNC: 4.3 G/DL (ref 3.5–5.2)
ALBUMIN/GLOB SERPL: 1.4 G/DL
ALP SERPL-CCNC: 300 U/L (ref 39–117)
ALT SERPL W P-5'-P-CCNC: 451 U/L (ref 1–41)
AMPHET+METHAMPHET UR QL: POSITIVE
AMPHETAMINES UR QL: NEGATIVE
ANION GAP SERPL CALCULATED.3IONS-SCNC: 12.3 MMOL/L (ref 5–15)
APAP SERPL-MCNC: <5 MCG/ML (ref 0–30)
AST SERPL-CCNC: 566 U/L (ref 1–40)
BACTERIA UR QL AUTO: NORMAL /HPF
BARBITURATES UR QL SCN: NEGATIVE
BASOPHILS # BLD AUTO: 0.01 10*3/MM3 (ref 0–0.2)
BASOPHILS NFR BLD AUTO: 0.1 % (ref 0–1.5)
BENZODIAZ UR QL SCN: NEGATIVE
BILIRUB SERPL-MCNC: 12.6 MG/DL (ref 0–1.2)
BILIRUB UR QL STRIP: ABNORMAL
BUN SERPL-MCNC: 19 MG/DL (ref 6–20)
BUN/CREAT SERPL: 24.7 (ref 7–25)
BUPRENORPHINE SERPL-MCNC: NEGATIVE NG/ML
CALCIUM SPEC-SCNC: 9.9 MG/DL (ref 8.6–10.5)
CANNABINOIDS SERPL QL: NEGATIVE
CHLORIDE SERPL-SCNC: 85 MMOL/L (ref 98–107)
CLARITY UR: CLEAR
CO2 SERPL-SCNC: 28.7 MMOL/L (ref 22–29)
COCAINE UR QL: NEGATIVE
COLOR UR: ABNORMAL
CREAT SERPL-MCNC: 0.77 MG/DL (ref 0.76–1.27)
D-LACTATE SERPL-SCNC: 1.4 MMOL/L (ref 0.5–2)
DEPRECATED RDW RBC AUTO: 43.1 FL (ref 37–54)
EGFRCR SERPLBLD CKD-EPI 2021: 118.3 ML/MIN/1.73
EOSINOPHIL # BLD AUTO: 0 10*3/MM3 (ref 0–0.4)
EOSINOPHIL NFR BLD AUTO: 0 % (ref 0.3–6.2)
ERYTHROCYTE [DISTWIDTH] IN BLOOD BY AUTOMATED COUNT: 13.2 % (ref 12.3–15.4)
ETHANOL BLD-MCNC: <10 MG/DL (ref 0–10)
ETHANOL UR QL: <0.01 %
GGT SERPL-CCNC: 1926 U/L (ref 8–61)
GLOBULIN UR ELPH-MCNC: 3 GM/DL
GLUCOSE SERPL-MCNC: 148 MG/DL (ref 65–99)
GLUCOSE UR STRIP-MCNC: ABNORMAL MG/DL
HAV IGM SERPL QL IA: NORMAL
HBV CORE IGM SERPL QL IA: NORMAL
HBV SURFACE AG SERPL QL IA: NORMAL
HCT VFR BLD AUTO: 44.3 % (ref 37.5–51)
HCV AB SER DONR QL: NORMAL
HGB BLD-MCNC: 15.8 G/DL (ref 13–17.7)
HGB UR QL STRIP.AUTO: ABNORMAL
HYALINE CASTS UR QL AUTO: NORMAL /LPF
IMM GRANULOCYTES # BLD AUTO: 0.03 10*3/MM3 (ref 0–0.05)
IMM GRANULOCYTES NFR BLD AUTO: 0.4 % (ref 0–0.5)
KETONES UR QL STRIP: NEGATIVE
LEUKOCYTE ESTERASE UR QL STRIP.AUTO: NEGATIVE
LIPASE SERPL-CCNC: 78 U/L (ref 13–60)
LYMPHOCYTES # BLD AUTO: 1.19 10*3/MM3 (ref 0.7–3.1)
LYMPHOCYTES NFR BLD AUTO: 17.5 % (ref 19.6–45.3)
MCH RBC QN AUTO: 31.3 PG (ref 26.6–33)
MCHC RBC AUTO-ENTMCNC: 35.7 G/DL (ref 31.5–35.7)
MCV RBC AUTO: 87.7 FL (ref 79–97)
METHADONE UR QL SCN: NEGATIVE
MONOCYTES # BLD AUTO: 0.48 10*3/MM3 (ref 0.1–0.9)
MONOCYTES NFR BLD AUTO: 7 % (ref 5–12)
NEUTROPHILS NFR BLD AUTO: 5.1 10*3/MM3 (ref 1.7–7)
NEUTROPHILS NFR BLD AUTO: 75 % (ref 42.7–76)
NITRITE UR QL STRIP: NEGATIVE
OPIATES UR QL: NEGATIVE
OXYCODONE UR QL SCN: NEGATIVE
PCP UR QL SCN: NEGATIVE
PH UR STRIP.AUTO: 6 [PH] (ref 5–8)
PLATELET # BLD AUTO: 144 10*3/MM3 (ref 140–450)
PMV BLD AUTO: 11 FL (ref 6–12)
POTASSIUM SERPL-SCNC: 3.4 MMOL/L (ref 3.5–5.2)
PROPOXYPH UR QL: NEGATIVE
PROT SERPL-MCNC: 7.3 G/DL (ref 6–8.5)
PROT UR QL STRIP: ABNORMAL
QT INTERVAL: 401 MS
RBC # BLD AUTO: 5.05 10*6/MM3 (ref 4.14–5.8)
RBC # UR STRIP: NORMAL /HPF
REF LAB TEST METHOD: NORMAL
SODIUM SERPL-SCNC: 126 MMOL/L (ref 136–145)
SP GR UR STRIP: 1.02 (ref 1–1.03)
SQUAMOUS #/AREA URNS HPF: NORMAL /HPF
TRICYCLICS UR QL SCN: POSITIVE
UROBILINOGEN UR QL STRIP: ABNORMAL
WBC # UR STRIP: NORMAL /HPF
WBC NRBC COR # BLD: 6.81 10*3/MM3 (ref 3.4–10.8)

## 2023-06-14 PROCEDURE — 82077 ASSAY SPEC XCP UR&BREATH IA: CPT | Performed by: EMERGENCY MEDICINE

## 2023-06-14 PROCEDURE — 96375 TX/PRO/DX INJ NEW DRUG ADDON: CPT

## 2023-06-14 PROCEDURE — 80074 ACUTE HEPATITIS PANEL: CPT | Performed by: EMERGENCY MEDICINE

## 2023-06-14 PROCEDURE — 70450 CT HEAD/BRAIN W/O DYE: CPT

## 2023-06-14 PROCEDURE — 25010000002 ONDANSETRON PER 1 MG: Performed by: INTERNAL MEDICINE

## 2023-06-14 PROCEDURE — 93010 ELECTROCARDIOGRAM REPORT: CPT | Performed by: STUDENT IN AN ORGANIZED HEALTH CARE EDUCATION/TRAINING PROGRAM

## 2023-06-14 PROCEDURE — 25510000001 IOPAMIDOL PER 1 ML: Performed by: EMERGENCY MEDICINE

## 2023-06-14 PROCEDURE — 81001 URINALYSIS AUTO W/SCOPE: CPT | Performed by: EMERGENCY MEDICINE

## 2023-06-14 PROCEDURE — 99285 EMERGENCY DEPT VISIT HI MDM: CPT

## 2023-06-14 PROCEDURE — 71045 X-RAY EXAM CHEST 1 VIEW: CPT

## 2023-06-14 PROCEDURE — 96367 TX/PROPH/DG ADDL SEQ IV INF: CPT

## 2023-06-14 PROCEDURE — 96361 HYDRATE IV INFUSION ADD-ON: CPT

## 2023-06-14 PROCEDURE — 82977 ASSAY OF GGT: CPT | Performed by: EMERGENCY MEDICINE

## 2023-06-14 PROCEDURE — 99285 EMERGENCY DEPT VISIT HI MDM: CPT | Performed by: EMERGENCY MEDICINE

## 2023-06-14 PROCEDURE — 36415 COLL VENOUS BLD VENIPUNCTURE: CPT

## 2023-06-14 PROCEDURE — 85025 COMPLETE CBC W/AUTO DIFF WBC: CPT | Performed by: EMERGENCY MEDICINE

## 2023-06-14 PROCEDURE — 96376 TX/PRO/DX INJ SAME DRUG ADON: CPT

## 2023-06-14 PROCEDURE — 25010000002 MORPHINE PER 10 MG: Performed by: EMERGENCY MEDICINE

## 2023-06-14 PROCEDURE — 25010000002 THIAMINE HCL 200 MG/2ML SOLUTION: Performed by: INTERNAL MEDICINE

## 2023-06-14 PROCEDURE — 25010000002 THIAMINE HCL 200 MG/2ML SOLUTION 2 ML VIAL: Performed by: EMERGENCY MEDICINE

## 2023-06-14 PROCEDURE — 25010000002 MORPHINE PER 10 MG: Performed by: INTERNAL MEDICINE

## 2023-06-14 PROCEDURE — 83690 ASSAY OF LIPASE: CPT | Performed by: EMERGENCY MEDICINE

## 2023-06-14 PROCEDURE — 80143 DRUG ASSAY ACETAMINOPHEN: CPT | Performed by: EMERGENCY MEDICINE

## 2023-06-14 PROCEDURE — 80053 COMPREHEN METABOLIC PANEL: CPT | Performed by: EMERGENCY MEDICINE

## 2023-06-14 PROCEDURE — 25010000002 ONDANSETRON PER 1 MG: Performed by: EMERGENCY MEDICINE

## 2023-06-14 PROCEDURE — 80306 DRUG TEST PRSMV INSTRMNT: CPT | Performed by: EMERGENCY MEDICINE

## 2023-06-14 PROCEDURE — 25010000002 SODIUM CHLORIDE 0.9 % WITH KCL 20 MEQ 20-0.9 MEQ/L-% SOLUTION: Performed by: INTERNAL MEDICINE

## 2023-06-14 PROCEDURE — 93005 ELECTROCARDIOGRAM TRACING: CPT | Performed by: EMERGENCY MEDICINE

## 2023-06-14 PROCEDURE — 96365 THER/PROPH/DIAG IV INF INIT: CPT

## 2023-06-14 PROCEDURE — 83605 ASSAY OF LACTIC ACID: CPT | Performed by: EMERGENCY MEDICINE

## 2023-06-14 PROCEDURE — 25010000002 METOCLOPRAMIDE PER 10 MG: Performed by: EMERGENCY MEDICINE

## 2023-06-14 PROCEDURE — 74177 CT ABD & PELVIS W/CONTRAST: CPT

## 2023-06-14 RX ORDER — SODIUM CHLORIDE 9 MG/ML
1000 INJECTION, SOLUTION INTRAVENOUS ONCE
Status: COMPLETED | OUTPATIENT
Start: 2023-06-14 | End: 2023-06-14

## 2023-06-14 RX ORDER — CLONIDINE HYDROCHLORIDE 0.1 MG/1
0.1 TABLET ORAL EVERY 4 HOURS PRN
Status: DISCONTINUED | OUTPATIENT
Start: 2023-06-14 | End: 2023-06-18 | Stop reason: HOSPADM

## 2023-06-14 RX ORDER — METOCLOPRAMIDE HYDROCHLORIDE 5 MG/ML
10 INJECTION INTRAMUSCULAR; INTRAVENOUS EVERY 6 HOURS PRN
Status: DISCONTINUED | OUTPATIENT
Start: 2023-06-14 | End: 2023-06-18 | Stop reason: HOSPADM

## 2023-06-14 RX ORDER — PANTOPRAZOLE SODIUM 40 MG/1
40 TABLET, DELAYED RELEASE ORAL
Status: DISCONTINUED | OUTPATIENT
Start: 2023-06-15 | End: 2023-06-15

## 2023-06-14 RX ORDER — BISACODYL 5 MG/1
5 TABLET, DELAYED RELEASE ORAL DAILY PRN
Status: DISCONTINUED | OUTPATIENT
Start: 2023-06-14 | End: 2023-06-18 | Stop reason: HOSPADM

## 2023-06-14 RX ORDER — LORAZEPAM 2 MG/ML
2 INJECTION INTRAMUSCULAR ONCE
Status: DISCONTINUED | OUTPATIENT
Start: 2023-06-14 | End: 2023-06-14

## 2023-06-14 RX ORDER — SODIUM CHLORIDE AND POTASSIUM CHLORIDE 150; 900 MG/100ML; MG/100ML
100 INJECTION, SOLUTION INTRAVENOUS CONTINUOUS
Status: DISCONTINUED | OUTPATIENT
Start: 2023-06-14 | End: 2023-06-16

## 2023-06-14 RX ORDER — THIAMINE HYDROCHLORIDE 100 MG/ML
200 INJECTION, SOLUTION INTRAMUSCULAR; INTRAVENOUS EVERY 8 HOURS SCHEDULED
Status: DISCONTINUED | OUTPATIENT
Start: 2023-06-14 | End: 2023-06-18 | Stop reason: HOSPADM

## 2023-06-14 RX ORDER — HYDROXYZINE HYDROCHLORIDE 25 MG/1
25 TABLET, FILM COATED ORAL 3 TIMES DAILY PRN
Status: DISCONTINUED | OUTPATIENT
Start: 2023-06-14 | End: 2023-06-18 | Stop reason: HOSPADM

## 2023-06-14 RX ORDER — FOLIC ACID 1 MG/1
1 TABLET ORAL DAILY
Status: DISCONTINUED | OUTPATIENT
Start: 2023-06-14 | End: 2023-06-18 | Stop reason: HOSPADM

## 2023-06-14 RX ORDER — METHOCARBAMOL 750 MG/1
750 TABLET, FILM COATED ORAL 3 TIMES DAILY
COMMUNITY
Start: 2023-05-16 | End: 2023-06-18 | Stop reason: HOSPADM

## 2023-06-14 RX ORDER — CLONIDINE HYDROCHLORIDE 0.1 MG/1
0.1 TABLET ORAL 2 TIMES DAILY PRN
COMMUNITY
Start: 2023-05-07

## 2023-06-14 RX ORDER — MIRTAZAPINE 15 MG/1
15 TABLET, FILM COATED ORAL NIGHTLY
Status: DISCONTINUED | OUTPATIENT
Start: 2023-06-14 | End: 2023-06-18 | Stop reason: HOSPADM

## 2023-06-14 RX ORDER — ONDANSETRON 4 MG/1
4 TABLET, FILM COATED ORAL EVERY 6 HOURS PRN
Status: DISCONTINUED | OUTPATIENT
Start: 2023-06-14 | End: 2023-06-18 | Stop reason: HOSPADM

## 2023-06-14 RX ORDER — ACETAMINOPHEN 325 MG/1
650 TABLET ORAL EVERY 4 HOURS PRN
Status: DISCONTINUED | OUTPATIENT
Start: 2023-06-14 | End: 2023-06-15

## 2023-06-14 RX ORDER — BISACODYL 10 MG
10 SUPPOSITORY, RECTAL RECTAL DAILY PRN
Status: DISCONTINUED | OUTPATIENT
Start: 2023-06-14 | End: 2023-06-18 | Stop reason: HOSPADM

## 2023-06-14 RX ORDER — AMOXICILLIN 250 MG
2 CAPSULE ORAL 2 TIMES DAILY
Status: DISCONTINUED | OUTPATIENT
Start: 2023-06-14 | End: 2023-06-18 | Stop reason: HOSPADM

## 2023-06-14 RX ORDER — METOCLOPRAMIDE HYDROCHLORIDE 5 MG/ML
10 INJECTION INTRAMUSCULAR; INTRAVENOUS ONCE
Status: COMPLETED | OUTPATIENT
Start: 2023-06-14 | End: 2023-06-14

## 2023-06-14 RX ORDER — ONDANSETRON 2 MG/ML
4 INJECTION INTRAMUSCULAR; INTRAVENOUS ONCE
Status: COMPLETED | OUTPATIENT
Start: 2023-06-14 | End: 2023-06-14

## 2023-06-14 RX ORDER — ONDANSETRON 2 MG/ML
4 INJECTION INTRAMUSCULAR; INTRAVENOUS EVERY 6 HOURS PRN
Status: DISCONTINUED | OUTPATIENT
Start: 2023-06-14 | End: 2023-06-18 | Stop reason: HOSPADM

## 2023-06-14 RX ORDER — OMEPRAZOLE 20 MG/1
20 CAPSULE, DELAYED RELEASE ORAL DAILY
COMMUNITY
Start: 2023-05-11

## 2023-06-14 RX ORDER — MORPHINE SULFATE 2 MG/ML
4 INJECTION, SOLUTION INTRAMUSCULAR; INTRAVENOUS ONCE
Status: COMPLETED | OUTPATIENT
Start: 2023-06-14 | End: 2023-06-14

## 2023-06-14 RX ORDER — PROPRANOLOL HYDROCHLORIDE 20 MG/1
20 TABLET ORAL 3 TIMES DAILY
Status: DISCONTINUED | OUTPATIENT
Start: 2023-06-14 | End: 2023-06-18 | Stop reason: HOSPADM

## 2023-06-14 RX ORDER — POLYETHYLENE GLYCOL 3350 17 G/17G
17 POWDER, FOR SOLUTION ORAL DAILY PRN
Status: DISCONTINUED | OUTPATIENT
Start: 2023-06-14 | End: 2023-06-18 | Stop reason: HOSPADM

## 2023-06-14 RX ORDER — BUSPIRONE HYDROCHLORIDE 10 MG/1
10 TABLET ORAL 3 TIMES DAILY
Status: DISCONTINUED | OUTPATIENT
Start: 2023-06-14 | End: 2023-06-18 | Stop reason: HOSPADM

## 2023-06-14 RX ORDER — BUSPIRONE HYDROCHLORIDE 10 MG/1
10 TABLET ORAL 3 TIMES DAILY
COMMUNITY
Start: 2023-05-18

## 2023-06-14 RX ORDER — MORPHINE SULFATE 2 MG/ML
2 INJECTION, SOLUTION INTRAMUSCULAR; INTRAVENOUS EVERY 4 HOURS PRN
Status: DISCONTINUED | OUTPATIENT
Start: 2023-06-14 | End: 2023-06-18 | Stop reason: HOSPADM

## 2023-06-14 RX ORDER — FLUTICASONE PROPIONATE 50 MCG
2 SPRAY, SUSPENSION (ML) NASAL DAILY
COMMUNITY
Start: 2023-04-24

## 2023-06-14 RX ORDER — HYDROXYZINE PAMOATE 50 MG/1
50 CAPSULE ORAL 4 TIMES DAILY PRN
COMMUNITY
Start: 2023-05-16

## 2023-06-14 RX ORDER — UREA 10 %
3 LOTION (ML) TOPICAL NIGHTLY PRN
Status: DISCONTINUED | OUTPATIENT
Start: 2023-06-14 | End: 2023-06-18 | Stop reason: HOSPADM

## 2023-06-14 RX ADMIN — POTASSIUM CHLORIDE AND SODIUM CHLORIDE 100 ML/HR: 900; 150 INJECTION, SOLUTION INTRAVENOUS at 18:28

## 2023-06-14 RX ADMIN — ONDANSETRON 4 MG: 2 INJECTION INTRAMUSCULAR; INTRAVENOUS at 10:54

## 2023-06-14 RX ADMIN — ONDANSETRON 4 MG: 2 INJECTION INTRAMUSCULAR; INTRAVENOUS at 18:28

## 2023-06-14 RX ADMIN — MORPHINE SULFATE 4 MG: 2 INJECTION, SOLUTION INTRAMUSCULAR; INTRAVENOUS at 10:54

## 2023-06-14 RX ADMIN — METOCLOPRAMIDE 10 MG: 5 INJECTION, SOLUTION INTRAMUSCULAR; INTRAVENOUS at 13:14

## 2023-06-14 RX ADMIN — BUSPIRONE HYDROCHLORIDE 10 MG: 10 TABLET ORAL at 22:12

## 2023-06-14 RX ADMIN — PROPRANOLOL HYDROCHLORIDE 20 MG: 20 TABLET ORAL at 22:12

## 2023-06-14 RX ADMIN — THIAMINE HYDROCHLORIDE 200 MG: 100 INJECTION, SOLUTION INTRAMUSCULAR; INTRAVENOUS at 22:13

## 2023-06-14 RX ADMIN — FOLIC ACID 1 MG: 5 INJECTION, SOLUTION INTRAMUSCULAR; INTRAVENOUS; SUBCUTANEOUS at 14:49

## 2023-06-14 RX ADMIN — SODIUM CHLORIDE 1000 ML: 9 INJECTION, SOLUTION INTRAVENOUS at 13:14

## 2023-06-14 RX ADMIN — THIAMINE HYDROCHLORIDE 200 MG: 100 INJECTION, SOLUTION INTRAMUSCULAR; INTRAVENOUS at 14:13

## 2023-06-14 RX ADMIN — IOPAMIDOL 85 ML: 755 INJECTION, SOLUTION INTRAVENOUS at 10:46

## 2023-06-14 RX ADMIN — MIRTAZAPINE 15 MG: 15 TABLET, FILM COATED ORAL at 22:12

## 2023-06-14 RX ADMIN — DOCUSATE SODIUM 50MG AND SENNOSIDES 8.6MG 2 TABLET: 8.6; 5 TABLET, FILM COATED ORAL at 22:13

## 2023-06-14 RX ADMIN — SODIUM CHLORIDE 1000 ML: 9 INJECTION, SOLUTION INTRAVENOUS at 10:21

## 2023-06-14 RX ADMIN — MORPHINE SULFATE 2 MG: 2 INJECTION, SOLUTION INTRAMUSCULAR; INTRAVENOUS at 22:03

## 2023-06-14 NOTE — PLAN OF CARE
Goal Outcome Evaluation:  Plan of Care Reviewed With: patient           Outcome Evaluation: Pt arrived to unit via wheelchair.  Will do admit database and skin assessment.  [facility] as admitting.  Will cont to monitor.

## 2023-06-14 NOTE — FSED PROVIDER NOTE
Subjective   History of Present Illness  The patient is a 38-year-old male with a past medical history of alcohol dependence, pancreatitis who presents with multiple complaints.  He states that over the last couple of days he has been having some dizziness and balance issues with turning the head.  He states that when he is standing still he is not dizzy but if he turns his head or changes positions he loses his balance and becomes dizzy.  He also reports a 3-week history of vomiting associate with some diarrhea and a 2-week history of cough.  He reports that he is a daily drinker and usually drinks about a pint of liquor per day.  He states his last drink was 2 to 3 days ago.  He states he was experiencing some withdrawal symptoms after he stopped drinking but states that these seem to be improving.  He reports some jaundice that has been going on for the last 3 days but worsened last night.  No fevers.  Review of Systems   Constitutional:  Negative for fever.   Respiratory:  Positive for cough.    Cardiovascular:  Negative for chest pain.   Gastrointestinal:  Positive for vomiting. Negative for diarrhea.   Skin:  Positive for color change. Negative for rash.   Neurological:  Positive for dizziness.   All other systems reviewed and are negative.    Past Medical History:   Diagnosis Date    ADD (attention deficit disorder)     Alcohol abuse     Dyslexia     Pancreatitis     Posttraumatic stress disorder        Allergies   Allergen Reactions    Ativan [Lorazepam] Mental Status Change    Haldol [Haloperidol Lactate] Other (See Comments)     seizure    Codeine Itching       Past Surgical History:   Procedure Laterality Date    APPENDECTOMY N/A 8/10/2016    Procedure: APPENDECTOMY LAPAROSCOPIC;  Surgeon: Bird Vazquez Jr., MD;  Location: [facility];  Service:        Family History   Problem Relation Age of Onset    Obesity Other        Social History     Socioeconomic History    Marital status: Single   Tobacco Use     Smoking status: Every Day     Packs/day: 1.00     Types: Electronic Cigarette, Cigarettes    Smokeless tobacco: Never   Vaping Use    Vaping Use: Every day   Substance and Sexual Activity    Alcohol use: Yes     Comment: ONE PINT DAILY    Drug use: No    Sexual activity: Defer           Objective   Physical Exam  Vitals reviewed.   Constitutional:       General: He is not in acute distress.     Appearance: He is well-developed. He is not ill-appearing.   Eyes:      General: Scleral icterus present.   Cardiovascular:      Rate and Rhythm: Normal rate and regular rhythm.      Heart sounds: Normal heart sounds.   Pulmonary:      Effort: No respiratory distress.      Breath sounds: Normal breath sounds.   Abdominal:      Palpations: Abdomen is soft.      Tenderness: There is no abdominal tenderness.   Musculoskeletal:         General: Normal range of motion.   Skin:     General: Skin is dry.      Coloration: Skin is jaundiced.   Neurological:      General: No focal deficit present.      Mental Status: He is alert. He is disoriented.      Cranial Nerves: No cranial nerve deficit.      Motor: No weakness.      Coordination: Coordination normal.      Comments: Disoriented to month (patient stated that the month was March)   Psychiatric:         Mood and Affect: Mood normal.       Procedures           ED Course  ED Course as of 06/14/23 1201   Wed Jun 14, 2023   1115 Labs reveal elevated liver enzymes and elevated bilirubin.  Lipase is also mildly elevated.  CT head unremarkable.  CT abdomen pelvis with possible residual or recurrent pancreatitis.  Will transfer patient for admission for further work-up and management. [DH]   1116 Banana bag not available at this facility.  Discussed with pharmacist who will try to obtain from another facility while awaiting patient transport. [DH]      ED Course User Index  [DH] Andrez Grimaldo MD                                           Medical Decision Making  Problems  Addressed:  Acute on chronic pancreatitis: complicated acute illness or injury  Alcoholic hepatitis, unspecified whether ascites present: complicated acute illness or injury    Amount and/or Complexity of Data Reviewed  Labs: ordered.  Radiology: ordered.  ECG/medicine tests: ordered.    Risk  Prescription drug management.  Decision regarding hospitalization.    38-year-old male presents with multiple complaints.  Patient is somewhat confused on exam but without focal neurologic deficit.  Will check CT of the brain.  Patient is also noted to be jaundiced on exam.  Will check CT of the abdomen and pelvis.  Additionally, will obtain basic lab work including liver enzymes, hepatitis panel, ammonia level, alcohol level, and acetaminophen level.  Urine toxicology.  Patient has elevated blood pressure and is mildly tachycardic and recently discontinued drinking.  Will give Ativan for concern for possible alcohol withdrawal.  Anticipate admission for alcohol withdrawal, alcohol hepatitis.    Final diagnoses:   Alcoholic hepatitis, unspecified whether ascites present   Acute on chronic pancreatitis       ED Disposition  ED Disposition       ED Disposition   Decision to Admit    Condition   --    Comment   Level of Care: Telemetry [5]   Diagnosis: Alcoholic hepatitis, unspecified whether ascites present [[id number]]   Admitting Physician: KATHERINE CLAY [[id number]]   Certification: I Certify That Inpatient Hospital Services Are Medically Necessary For Greater Than 2 Midnights                 No follow-up provider specified.       Medication List      No changes were made to your prescriptions during this visit.

## 2023-06-14 NOTE — ED NOTES
"Pt reports he has been drinking \"the last few weeks\" and stopped Monday. Pt reports hx of withdraw shakes, but denies seizure. Mother at bedside at this time.    Luciana Alfredo RN    "

## 2023-06-14 NOTE — H&P
HISTORY AND PHYSICAL   [facility]        Date of Admission: 2023  Patient Identification:  Name: Mane Gunderson  Age: 38 y.o.  Sex: male  :  1985  MRN: [id number]                     Primary Care Physician: Dominique Ontiveros APRN    Chief Complaint:  38 year old gentleman who presented to the emergency room with dizziness, balance issues and buzzing in his ears; he notes that when he turns his head quickly he gets dizzy; he has a history of alcohol dependence and says he stopped drinking two days ago; he has been jaundiced; he denies fever or chills    History of Present Illness:  As above    Past Medical History:  Past Medical History:   Diagnosis Date    ADD (attention deficit disorder)     Alcohol abuse     Dyslexia     Pancreatitis     Posttraumatic stress disorder      Past Surgical History:  Past Surgical History:   Procedure Laterality Date    APPENDECTOMY N/A 8/10/2016    Procedure: APPENDECTOMY LAPAROSCOPIC;  Surgeon: Bird Vazquez Jr., MD;  Location: [facility];  Service:       Home Meds:  Medications Prior to Admission   Medication Sig Dispense Refill Last Dose    amphetamine-dextroamphetamine (ADDERALL) 10 MG tablet Take 1.5 tablets by mouth 2 (Two) Times a Day.       busPIRone (BUSPAR) 10 MG tablet Take 1 tablet by mouth 3 (Three) Times a Day.       cloNIDine (CATAPRES) 0.1 MG tablet Take 1 tablet by mouth 2 (Two) Times a Day As Needed. PRN       cyclobenzaprine (FLEXERIL) 10 MG tablet Take 1 tablet by mouth 3 (Three) Times a Day As Needed for Muscle Spasms.       fluticasone (FLONASE) 50 MCG/ACT nasal spray 2 sprays into the nostril(s) as directed by provider Daily.       hydrOXYzine pamoate (VISTARIL) 50 MG capsule Take 1 capsule by mouth 4 (Four) Times a Day As Needed.       methocarbamol (ROBAXIN) 750 MG tablet Take 1 tablet by mouth 3 (Three) Times a Day.       mirtazapine (REMERON) 15 MG tablet Take 1 tablet by mouth Every Night.       omeprazole (priLOSEC) 20 MG  capsule Take 1 capsule by mouth Daily.       Pramoxine HCl, Perianal, 1 % foam Insert  into the rectum Every 2 (Two) Hours As Needed for Hemorrhoids.       propranolol (INDERAL) 20 MG tablet Take 1 tablet by mouth 3 (Three) Times a Day.       venlafaxine XR (EFFEXOR-XR) 150 MG 24 hr capsule Take 225 mg by mouth Every Morning.       hyoscyamine sulfate (ANASPAZ) 0.125 MG tablet dispersible disintegrating tablet Place 125 mcg on the tongue Every 4 (Four) Hours As Needed.       L-Methylfolate-Algae (DEPLIN 7.5 PO) Take 1 capsule by mouth Daily.          Allergies:  Allergies   Allergen Reactions    Ativan [Lorazepam] Mental Status Change    Haldol [Haloperidol Lactate] Other (See Comments)     seizure    Codeine Itching     Immunizations:  Immunization History   Administered Date(s) Administered    COVID-19 (PFIZER) Purple Cap Monovalent 04/21/2021, 05/12/2021     Social History:   Social History     Social History Narrative    Not on file     Social History     Socioeconomic History    Marital status: Single   Tobacco Use    Smoking status: Every Day     Packs/day: 1.00     Types: Electronic Cigarette, Cigarettes    Smokeless tobacco: Never   Vaping Use    Vaping Use: Every day   Substance and Sexual Activity    Alcohol use: Yes     Comment: ONE PINT DAILY    Drug use: No    Sexual activity: Defer       Family History:  Family History   Problem Relation Age of Onset    Obesity Other         Review of Systems  See history of present illness and past medical history.  Patient denies headache, syncope, falls, trauma, change in vision, change in hearing, change in taste, changes in weight, changes in appetite, focal weakness, numbness, or paresthesia.  Patient denies chest pain, palpitations, dyspnea, orthopnea, PND, cough, sinus pressure, rhinorrhea, epistaxis, hemoptysis,hematemesis, diarrhea, constipation or hematochezia.  Denies cold or heat intolerance, polydipsia, polyuria, polyphagia. Denies hematuria, pyuria,  "dysuria, hesitancy, frequency or urgency. Denies consumption of raw and under cooked meats foods or change in water source.  Denies fever, chills, sweats, night sweats.  Denies missing any routine medications. Remainder of ROS is negative.    Objective:  T Max 24 hrs: Temp (24hrs), Av.7 °F (37.1 °C), Min:98 °F (36.7 °C), Max:99 °F (37.2 °C)    Vitals Ranges:   Temp:  [98 °F (36.7 °C)-99 °F (37.2 °C)] 98 °F (36.7 °C)  Heart Rate:  [] 122  Resp:  [16-18] 18  BP: (150-160)/() 159/111      Exam:  BP (!) 159/111 (BP Location: Left arm, Patient Position: Lying)   Pulse (!) 122   Temp 98 °F (36.7 °C) (Oral)   Resp 18   Ht 175.3 cm (69\")   Wt 74.8 kg (165 lb)   SpO2 98%   BMI 24.37 kg/m²     General Appearance:    Alert, cooperative, no distress, appears stated age; jaundiced   Head:    Normocephalic, without obvious abnormality, atraumatic   Eyes:    PERRL, conjunctivae/corneas clear, EOM's intact, both eyes   Ears:    Normal external ear canals, both ears   Nose:   Nares normal, septum midline, mucosa normal, no drainage    or sinus tenderness   Throat:   Lips, mucosa, and tongue normal   Neck:   Supple, symmetrical, trachea midline, no adenopathy;     thyroid:  no enlargement/tenderness/nodules; no carotid    bruit or JVD   Back:     Symmetric, no curvature, ROM normal, no CVA tenderness   Lungs:     Clear to auscultation bilaterally, respirations unlabored   Chest Wall:    No tenderness or deformity    Heart:    Regular rate and rhythm, S1 and S2 normal, no murmur, rub   or gallop   Abdomen:     Soft, nontender, bowel sounds active all four quadrants,     no masses, no hepatomegaly, no splenomegaly   Extremities:   Extremities normal, atraumatic, no cyanosis or edema   Pulses:   2+ and symmetric all extremities   Skin:   Skin color, texture, turgor normal, no rashes or lesions               .    Data Review:  Labs in chart were reviewed.  WBC   Date Value Ref Range Status   2023 6.81 3.40 - " 10.80 10*3/mm3 Final     Hemoglobin   Date Value Ref Range Status   06/14/2023 15.8 13.0 - 17.7 g/dL Final     Hematocrit   Date Value Ref Range Status   06/14/2023 44.3 37.5 - 51.0 % Final     Platelets   Date Value Ref Range Status   06/14/2023 144 140 - 450 10*3/mm3 Final     Sodium   Date Value Ref Range Status   06/14/2023 126 (L) 136 - 145 mmol/L Final     Potassium   Date Value Ref Range Status   06/14/2023 3.4 (L) 3.5 - 5.2 mmol/L Final     Comment:     Slight hemolysis detected by analyzer. Results may be affected.     Chloride   Date Value Ref Range Status   06/14/2023 85 (L) 98 - 107 mmol/L Final     CO2   Date Value Ref Range Status   06/14/2023 28.7 22.0 - 29.0 mmol/L Final     BUN   Date Value Ref Range Status   06/14/2023 19 6 - 20 mg/dL Final     Creatinine   Date Value Ref Range Status   06/14/2023 0.77 0.76 - 1.27 mg/dL Final     Glucose   Date Value Ref Range Status   06/14/2023 148 (H) 65 - 99 mg/dL Final     Calcium   Date Value Ref Range Status   06/14/2023 9.9 8.6 - 10.5 mg/dL Final     AST (SGOT)   Date Value Ref Range Status   06/14/2023 566 (H) 1 - 40 U/L Final     ALT (SGPT)   Date Value Ref Range Status   06/14/2023 451 (H) 1 - 41 U/L Final     Alkaline Phosphatase   Date Value Ref Range Status   06/14/2023 300 (H) 39 - 117 U/L Final                Imaging Results (All)       Procedure Component Value Units Date/Time    CT Head Without Contrast [[id number]] Collected: 06/14/23 1104     Updated: 06/14/23 1104    Narrative:      CT OF THE BRAIN WITHOUT CONTRAST 06/14/2023     HISTORY: Mental status change. Dizziness.     Axial images were obtained through the brain without intravenous  contrast.     The brain parenchyma and ventricular system appear within normal limits.  No mass lesions, midline shift, intracranial hemorrhage or evidence of  infarction is demonstrated.       Impression:      No acute process identified.           Radiation dose reduction techniques were utilized, including  automated  exposure control and exposure modulation based on body size.          CT Abdomen Pelvis With Contrast [[id number]] Collected: 06/14/23 1054     Updated: 06/14/23 1100    Narrative:      EXAMINATION: CT OF THE ABDOMEN AND PELVIS WITH CONTRAST     TECHNIQUE: Computed tomography of the abdomen and pelvis after the  uneventful administration of nonionic intravenous contrast per protocol.  Radiation dose reduction techniques were utilized, including automated  exposure control and exposure modulation based on body size.      HISTORY: Nausea, vomiting meeting, and jaundice     COMPARISON: 04/25/2022     FINDINGS: Limited evaluation of the inferior thorax demonstrates no  consolidation, pleural effusion, or pneumothorax. The heart is normal in  size and configuration, without pericardial effusion.     There is hepatic steatosis with mild focal sparing near the gallbladder  fossa. There is interval near resolution of peripancreatic stranding  with mild residual pancreatic thickening along the body and with  heterogeneous appearance of the pancreatic parenchyma. The gallbladder  is distended. No biliary ductal dilation is seen. There is mild gastric  distention. The urinary bladder is distended.     The spleen, adrenal glands, kidneys, small bowel, large bowel, and  abdominal vasculature are normal. No intraperitoneal fluid collection or  free gas are seen. No enlarged lymph nodes are demonstrated.     Bone windows demonstrate degenerative changes, without suspicious  osseous lesion seen.       Impression:      1. Improved findings of pancreatitis, with possible residual or  recurrent pancreatitis demonstrated  2. Hepatic steatosis  3. Additional incidental findings as above.     This report was finalized on 6/14/2023 10:57 AM by Dr. Giacomo Jain M.D.       XR Chest 1 View [[id number]] Collected: 06/14/23 1042     Updated: 06/14/23 1046    Narrative:      PORTABLE CHEST X-RAY     HISTORY: Chest pain with  dizziness, jaundice, and multiple episodes of  vomiting overnight..     Portable chest x-ray is provided. Correlation: Chest x-ray 08/27/2014.     FINDINGS: The cardiomediastinal silhouette is normal. The lungs are  clear. The costophrenic sulci are dry and the bones appear normal. There  is no pneumothorax. No pneumomediastinum.       Impression:      Negative.     This report was finalized on 6/14/2023 10:42 AM by Dr. Jonatan Wheat M.D.                 Assessment:  Active Hospital Problems    Diagnosis  POA    **Alcoholic hepatitis, unspecified whether ascites present [K70.10]  Yes      Resolved Hospital Problems   No resolved problems to display.   Alcohol dependence  Jaundice  Hypertension  Hypokalemia  pancreatitis    Plan:  Will continue fluids  Replace potassium  Monitor on telemetry  Ask access to see him  Clear liquid diet  Pain meds  Still with pancreatitis on ct  Dw patient and nurse    Maliha Cardoza MD  6/14/2023  19:52 EDT

## 2023-06-14 NOTE — ED NOTES
Call placed to [facility] for patient transport to [facility]. Dispatcher [facility] ETA [id number].

## 2023-06-14 NOTE — ED NOTES
Nursing report ED to floor  Mane Gunderson  38 y.o.  male    HPI :   Chief Complaint   Patient presents with    Vomiting    Balance Issues       Admitting doctor:   Lewis Finley MD    Admitting diagnosis:   The primary encounter diagnosis was Alcoholic hepatitis, unspecified whether ascites present. A diagnosis of Acute on chronic pancreatitis was also pertinent to this visit.    Code status:   Current Code Status       Date Active Code Status Order ID Comments User Context       Prior            Allergies:   Ativan [lorazepam], Haldol [haloperidol lactate], and Codeine    Isolation:   No active isolations    Intake and Output    Intake/Output Summary (Last 24 hours) at 6/14/2023 1319  Last data filed at 6/14/2023 1310  Gross per 24 hour   Intake 1000 ml   Output --   Net 1000 ml       Weight:       06/14/23 0915   Weight: 74.8 kg (165 lb)       Most recent vitals:   Vitals:    06/14/23 1103 06/14/23 1230 06/14/23 1246 06/14/23 1249   BP:  (!) 150/107     BP Location:       Patient Position:       Pulse: 92 89 84 98   Resp:       Temp:       TempSrc:       SpO2: 96% 95% 96% 99%   Weight:       Height:           Active LDAs/IV Access:   Lines, Drains & Airways       Active LDAs       Name Placement date Placement time Site Days    Peripheral IV 06/14/23 0917 Right Antecubital 06/14/23 0917  Antecubital  less than 1                    Labs (abnormal labs have a star):   Labs Reviewed   URINALYSIS W/ MICROSCOPIC IF INDICATED (NO CULTURE) - Abnormal; Notable for the following components:       Result Value    Color, UA Wanda (*)     Glucose,  mg/dL (Trace) (*)     Bilirubin, UA Moderate (2+) (*)     Blood, UA Small (1+) (*)     Protein, UA Trace (*)     Urobilinogen, UA 2.0 E.U./dL (*)     All other components within normal limits   URINE DRUG SCREEN - Abnormal; Notable for the following components:    Amphetamine Screen, Urine Positive (*)     Tricyclic Antidepressants Screen Positive (*)     All  other components within normal limits    Narrative:     Cutoff For Drugs Screened:    Amphetamines               500 ng/ml  Barbiturates               200 ng/ml  Benzodiazepines            150 ng/ml  Cocaine                    150 ng/ml  Methadone                  200 ng/ml  Opiates                    100 ng/ml  Phencyclidine               25 ng/ml  THC                            50 ng/ml  Methamphetamine            500 ng/ml  Tricyclic Antidepressants  300 ng/ml  Oxycodone                  100 ng/ml  Propoxyphene               300 ng/ml  Buprenorphine               10 ng/ml    The normal value for all drugs tested is negative. This report includes unconfirmed screening results, with the cutoff values listed, to be used for medical treatment purposes only.  Unconfirmed results must not be used for non-medical purposes such as employment or legal testing.  Clinical consideration should be applied to any drug of abuse test, particularly when unconfirmed results are used.     COMPREHENSIVE METABOLIC PANEL - Abnormal; Notable for the following components:    Glucose 148 (*)     Sodium 126 (*)     Potassium 3.4 (*)     Chloride 85 (*)     ALT (SGPT) 451 (*)     AST (SGOT) 566 (*)     Alkaline Phosphatase 300 (*)     Total Bilirubin 12.6 (*)     All other components within normal limits    Narrative:     GFR Normal >60  Chronic Kidney Disease <60  Kidney Failure <15     LIPASE - Abnormal; Notable for the following components:    Lipase 78 (*)     All other components within normal limits   CBC WITH AUTO DIFFERENTIAL - Abnormal; Notable for the following components:    Lymphocyte % 17.5 (*)     Eosinophil % 0.0 (*)     All other components within normal limits   ACETAMINOPHEN LEVEL - Normal   LACTIC ACID, PLASMA - Normal   ETHANOL   AMMONIA   HEPATITIS PANEL, ACUTE   GAMMA GT   URINALYSIS, MICROSCOPIC ONLY   CBC AND DIFFERENTIAL    Narrative:     The following orders were created for panel order CBC &  Differential.  Procedure                               Abnormality         Status                     ---------                               -----------         ------                     CBC Auto Differential[[id number]]        Abnormal            Final result                 Please view results for these tests on the individual orders.       EKG:   ECG 12 Lead Other; dizzy   Final Result   HEART RATE= 83  bpm   RR Interval= 723  ms   UT Interval= 122  ms   P Horizontal Axis= 0  deg   P Front Axis= 38  deg   QRSD Interval= 99  ms   QT Interval= 401  ms   QRS Axis= 67  deg   T Wave Axis= 56  deg   - NORMAL ECG -   Sinus rhythm   Consider left ventricular hypertrophy   When compared with ECG of 29-Apr-2022 20:01:38,   No significant change   Electronically Signed By: Williams Pa ([facility]) 14-Jun-2023 12:11:15   Date and Time of Study: 2023-06-14 09:53:15          Meds given in ED:   Medications   folic acid 1 mg in sodium chloride 0.9 % 50 mL IVPB (has no administration in time range)   thiamine (B-1) 200 mg in sodium chloride 0.9 % 100 mL IVPB (has no administration in time range)   sodium chloride 0.9 % bolus 1,000 mL (0 mL Intravenous Stopped 6/14/23 1310)   morphine injection 4 mg (4 mg Intravenous Given 6/14/23 1054)   ondansetron (ZOFRAN) injection 4 mg (4 mg Intravenous Given 6/14/23 1054)   iopamidol (ISOVUE-370) 76 % injection 100 mL (85 mL Intravenous Given 6/14/23 1046)   sodium chloride 0.9 % infusion 1,000 mL (1,000 mL Intravenous New Bag 6/14/23 1314)   metoclopramide (REGLAN) injection 10 mg (10 mg Intravenous Given 6/14/23 1314)       Imaging results:  CT Head Without Contrast    Result Date: 6/14/2023  No acute process identified.    Radiation dose reduction techniques were utilized, including automated exposure control and exposure modulation based on body size.       CT Abdomen Pelvis With Contrast    Result Date: 6/14/2023  1. Improved findings of pancreatitis, with possible residual or recurrent  pancreatitis demonstrated 2. Hepatic steatosis 3. Additional incidental findings as above.  This report was finalized on 6/14/2023 10:57 AM by Dr. Giacomo Jain M.D.      XR Chest 1 View    Result Date: 6/14/2023  Negative.  This report was finalized on 6/14/2023 10:42 AM by Dr. Jonatan Wheat M.D.       Ambulatory status:   - up at [facility]      Social issues:   Social History     Socioeconomic History    Marital status: Single   Tobacco Use    Smoking status: Every Day     Packs/day: 1.00     Types: Electronic Cigarette, Cigarettes    Smokeless tobacco: Never   Vaping Use    Vaping Use: Every day   Substance and Sexual Activity    Alcohol use: Yes     Comment: ONE PINT DAILY    Drug use: No    Sexual activity: Defer       NIH Stroke Scale:         Luciana Alfredo RN  06/14/23 13:19 EDT

## 2023-06-15 ENCOUNTER — APPOINTMENT (OUTPATIENT)
Dept: NEUROLOGY | Facility: HOSPITAL | Age: 38
DRG: 432 | End: 2023-06-15
Payer: COMMERCIAL

## 2023-06-15 ENCOUNTER — APPOINTMENT (OUTPATIENT)
Dept: MRI IMAGING | Facility: HOSPITAL | Age: 38
DRG: 432 | End: 2023-06-15
Payer: COMMERCIAL

## 2023-06-15 LAB
ALBUMIN SERPL-MCNC: 4 G/DL (ref 3.5–5.2)
ALP SERPL-CCNC: 238 U/L (ref 39–117)
ALT SERPL W P-5'-P-CCNC: 343 U/L (ref 1–41)
AMMONIA BLD-SCNC: 43 UMOL/L (ref 16–60)
ANION GAP SERPL CALCULATED.3IONS-SCNC: 9 MMOL/L (ref 5–15)
AST SERPL-CCNC: 422 U/L (ref 1–40)
BILIRUB CONJ SERPL-MCNC: 5.7 MG/DL (ref 0–0.3)
BILIRUB INDIRECT SERPL-MCNC: 3.3 MG/DL
BILIRUB SERPL-MCNC: 9 MG/DL (ref 0–1.2)
BUN SERPL-MCNC: 8 MG/DL (ref 6–20)
BUN/CREAT SERPL: 8.2 (ref 7–25)
CALCIUM SPEC-SCNC: 8.6 MG/DL (ref 8.6–10.5)
CERULOPLASMIN SERPL-MCNC: 15 MG/DL (ref 16–31)
CHLORIDE SERPL-SCNC: 95 MMOL/L (ref 98–107)
CO2 SERPL-SCNC: 29 MMOL/L (ref 22–29)
CREAT SERPL-MCNC: 0.98 MG/DL (ref 0.76–1.27)
DEPRECATED RDW RBC AUTO: 44.4 FL (ref 37–54)
EGFRCR SERPLBLD CKD-EPI 2021: 101.9 ML/MIN/1.73
ERYTHROCYTE [DISTWIDTH] IN BLOOD BY AUTOMATED COUNT: 13.9 % (ref 12.3–15.4)
FOLATE SERPL-MCNC: >20 NG/ML (ref 4.78–24.2)
GLUCOSE SERPL-MCNC: 98 MG/DL (ref 65–99)
HCT VFR BLD AUTO: 39.1 % (ref 37.5–51)
HGB BLD-MCNC: 13.9 G/DL (ref 13–17.7)
INR PPP: 1.04 (ref 0.9–1.1)
MAGNESIUM SERPL-MCNC: 2.3 MG/DL (ref 1.6–2.6)
MCH RBC QN AUTO: 31.5 PG (ref 26.6–33)
MCHC RBC AUTO-ENTMCNC: 35.5 G/DL (ref 31.5–35.7)
MCV RBC AUTO: 88.7 FL (ref 79–97)
PHOSPHATE SERPL-MCNC: 2.1 MG/DL (ref 2.5–4.5)
PLATELET # BLD AUTO: 119 10*3/MM3 (ref 140–450)
PMV BLD AUTO: 10 FL (ref 6–12)
POTASSIUM SERPL-SCNC: 3.7 MMOL/L (ref 3.5–5.2)
PROT SERPL-MCNC: 6.5 G/DL (ref 6–8.5)
PROTHROMBIN TIME: 13.8 SECONDS (ref 11.7–14.2)
RBC # BLD AUTO: 4.41 10*6/MM3 (ref 4.14–5.8)
SODIUM SERPL-SCNC: 133 MMOL/L (ref 136–145)
TSH SERPL DL<=0.05 MIU/L-ACNC: 0.86 UIU/ML (ref 0.27–4.2)
VIT B12 BLD-MCNC: 1505 PG/ML (ref 211–946)
WBC NRBC COR # BLD: 5.74 10*3/MM3 (ref 3.4–10.8)

## 2023-06-15 PROCEDURE — 82746 ASSAY OF FOLIC ACID SERUM: CPT | Performed by: PSYCHIATRY & NEUROLOGY

## 2023-06-15 PROCEDURE — 25010000002 THIAMINE HCL 200 MG/2ML SOLUTION: Performed by: INTERNAL MEDICINE

## 2023-06-15 PROCEDURE — 80076 HEPATIC FUNCTION PANEL: CPT | Performed by: INTERNAL MEDICINE

## 2023-06-15 PROCEDURE — 82525 ASSAY OF COPPER: CPT | Performed by: PSYCHIATRY & NEUROLOGY

## 2023-06-15 PROCEDURE — 99222 1ST HOSP IP/OBS MODERATE 55: CPT | Performed by: PSYCHIATRY & NEUROLOGY

## 2023-06-15 PROCEDURE — 99221 1ST HOSP IP/OBS SF/LOW 40: CPT | Performed by: NURSE PRACTITIONER

## 2023-06-15 PROCEDURE — 82607 VITAMIN B-12: CPT | Performed by: PSYCHIATRY & NEUROLOGY

## 2023-06-15 PROCEDURE — 95816 EEG AWAKE AND DROWSY: CPT | Performed by: PSYCHIATRY & NEUROLOGY

## 2023-06-15 PROCEDURE — 96376 TX/PRO/DX INJ SAME DRUG ADON: CPT

## 2023-06-15 PROCEDURE — 0 GADOBENATE DIMEGLUMINE 529 MG/ML SOLUTION: Performed by: STUDENT IN AN ORGANIZED HEALTH CARE EDUCATION/TRAINING PROGRAM

## 2023-06-15 PROCEDURE — 85027 COMPLETE CBC AUTOMATED: CPT | Performed by: INTERNAL MEDICINE

## 2023-06-15 PROCEDURE — 84443 ASSAY THYROID STIM HORMONE: CPT | Performed by: PSYCHIATRY & NEUROLOGY

## 2023-06-15 PROCEDURE — 84100 ASSAY OF PHOSPHORUS: CPT | Performed by: STUDENT IN AN ORGANIZED HEALTH CARE EDUCATION/TRAINING PROGRAM

## 2023-06-15 PROCEDURE — A9577 INJ MULTIHANCE: HCPCS | Performed by: STUDENT IN AN ORGANIZED HEALTH CARE EDUCATION/TRAINING PROGRAM

## 2023-06-15 PROCEDURE — 83735 ASSAY OF MAGNESIUM: CPT | Performed by: STUDENT IN AN ORGANIZED HEALTH CARE EDUCATION/TRAINING PROGRAM

## 2023-06-15 PROCEDURE — 80048 BASIC METABOLIC PNL TOTAL CA: CPT | Performed by: INTERNAL MEDICINE

## 2023-06-15 PROCEDURE — 90791 PSYCH DIAGNOSTIC EVALUATION: CPT

## 2023-06-15 PROCEDURE — 70553 MRI BRAIN STEM W/O & W/DYE: CPT

## 2023-06-15 PROCEDURE — 97165 OT EVAL LOW COMPLEX 30 MIN: CPT

## 2023-06-15 PROCEDURE — 82390 ASSAY OF CERULOPLASMIN: CPT | Performed by: PSYCHIATRY & NEUROLOGY

## 2023-06-15 PROCEDURE — 25010000002 MORPHINE PER 10 MG: Performed by: INTERNAL MEDICINE

## 2023-06-15 PROCEDURE — 96361 HYDRATE IV INFUSION ADD-ON: CPT

## 2023-06-15 PROCEDURE — 95819 EEG AWAKE AND ASLEEP: CPT

## 2023-06-15 PROCEDURE — 25010000002 SODIUM CHLORIDE 0.9 % WITH KCL 20 MEQ 20-0.9 MEQ/L-% SOLUTION: Performed by: INTERNAL MEDICINE

## 2023-06-15 PROCEDURE — 85610 PROTHROMBIN TIME: CPT | Performed by: STUDENT IN AN ORGANIZED HEALTH CARE EDUCATION/TRAINING PROGRAM

## 2023-06-15 PROCEDURE — 82140 ASSAY OF AMMONIA: CPT | Performed by: PSYCHIATRY & NEUROLOGY

## 2023-06-15 RX ORDER — LORAZEPAM 2 MG/ML
1 INJECTION INTRAMUSCULAR
Status: DISCONTINUED | OUTPATIENT
Start: 2023-06-15 | End: 2023-06-15

## 2023-06-15 RX ORDER — LORAZEPAM 2 MG/ML
2 INJECTION INTRAMUSCULAR
Status: DISCONTINUED | OUTPATIENT
Start: 2023-06-15 | End: 2023-06-15

## 2023-06-15 RX ORDER — LORAZEPAM 1 MG/1
2 TABLET ORAL EVERY 6 HOURS
Status: DISCONTINUED | OUTPATIENT
Start: 2023-06-15 | End: 2023-06-15

## 2023-06-15 RX ORDER — OXYCODONE HYDROCHLORIDE 5 MG/1
5 TABLET ORAL EVERY 8 HOURS PRN
Status: DISCONTINUED | OUTPATIENT
Start: 2023-06-15 | End: 2023-06-15

## 2023-06-15 RX ORDER — CHLORDIAZEPOXIDE HYDROCHLORIDE 25 MG/1
25 CAPSULE, GELATIN COATED ORAL 3 TIMES DAILY
Status: DISCONTINUED | OUTPATIENT
Start: 2023-06-15 | End: 2023-06-17

## 2023-06-15 RX ORDER — LORAZEPAM 1 MG/1
1 TABLET ORAL EVERY 6 HOURS
Status: DISCONTINUED | OUTPATIENT
Start: 2023-06-16 | End: 2023-06-15

## 2023-06-15 RX ORDER — LORAZEPAM 1 MG/1
2 TABLET ORAL
Status: DISCONTINUED | OUTPATIENT
Start: 2023-06-15 | End: 2023-06-15

## 2023-06-15 RX ORDER — OXYCODONE HYDROCHLORIDE 5 MG/1
5 TABLET ORAL EVERY 6 HOURS PRN
Status: DISCONTINUED | OUTPATIENT
Start: 2023-06-15 | End: 2023-06-18 | Stop reason: HOSPADM

## 2023-06-15 RX ORDER — PANTOPRAZOLE SODIUM 40 MG/1
40 TABLET, DELAYED RELEASE ORAL
Status: DISCONTINUED | OUTPATIENT
Start: 2023-06-15 | End: 2023-06-18 | Stop reason: HOSPADM

## 2023-06-15 RX ORDER — LORAZEPAM 1 MG/1
1 TABLET ORAL
Status: DISCONTINUED | OUTPATIENT
Start: 2023-06-15 | End: 2023-06-15

## 2023-06-15 RX ADMIN — THIAMINE HYDROCHLORIDE 200 MG: 100 INJECTION, SOLUTION INTRAMUSCULAR; INTRAVENOUS at 22:48

## 2023-06-15 RX ADMIN — DOCUSATE SODIUM 50MG AND SENNOSIDES 8.6MG 2 TABLET: 8.6; 5 TABLET, FILM COATED ORAL at 07:55

## 2023-06-15 RX ADMIN — PANTOPRAZOLE SODIUM 40 MG: 40 TABLET, DELAYED RELEASE ORAL at 18:19

## 2023-06-15 RX ADMIN — POTASSIUM CHLORIDE AND SODIUM CHLORIDE 100 ML/HR: 900; 150 INJECTION, SOLUTION INTRAVENOUS at 05:00

## 2023-06-15 RX ADMIN — PANTOPRAZOLE SODIUM 40 MG: 40 TABLET, DELAYED RELEASE ORAL at 06:30

## 2023-06-15 RX ADMIN — MIRTAZAPINE 15 MG: 15 TABLET, FILM COATED ORAL at 21:01

## 2023-06-15 RX ADMIN — HYDROXYZINE HYDROCHLORIDE 25 MG: 25 TABLET ORAL at 11:04

## 2023-06-15 RX ADMIN — BUSPIRONE HYDROCHLORIDE 10 MG: 10 TABLET ORAL at 18:19

## 2023-06-15 RX ADMIN — PROPRANOLOL HYDROCHLORIDE 20 MG: 20 TABLET ORAL at 07:56

## 2023-06-15 RX ADMIN — Medication 3 MG: at 02:25

## 2023-06-15 RX ADMIN — CHLORDIAZEPOXIDE HYDROCHLORIDE 25 MG: 25 CAPSULE ORAL at 14:30

## 2023-06-15 RX ADMIN — BUSPIRONE HYDROCHLORIDE 10 MG: 10 TABLET ORAL at 21:01

## 2023-06-15 RX ADMIN — Medication 1 MG: at 07:56

## 2023-06-15 RX ADMIN — CLONIDINE HYDROCHLORIDE 0.1 MG: 0.1 TABLET ORAL at 14:47

## 2023-06-15 RX ADMIN — MORPHINE SULFATE 2 MG: 2 INJECTION, SOLUTION INTRAMUSCULAR; INTRAVENOUS at 06:30

## 2023-06-15 RX ADMIN — GADOBENATE DIMEGLUMINE 20 ML: 529 INJECTION, SOLUTION INTRAVENOUS at 22:05

## 2023-06-15 RX ADMIN — PROPRANOLOL HYDROCHLORIDE 20 MG: 20 TABLET ORAL at 18:19

## 2023-06-15 RX ADMIN — POTASSIUM CHLORIDE AND SODIUM CHLORIDE 100 ML/HR: 900; 150 INJECTION, SOLUTION INTRAVENOUS at 14:47

## 2023-06-15 RX ADMIN — VENLAFAXINE HYDROCHLORIDE 225 MG: 150 CAPSULE, EXTENDED RELEASE ORAL at 07:56

## 2023-06-15 RX ADMIN — HYDROXYZINE HYDROCHLORIDE 25 MG: 25 TABLET ORAL at 01:27

## 2023-06-15 RX ADMIN — CHLORDIAZEPOXIDE HYDROCHLORIDE 25 MG: 25 CAPSULE ORAL at 21:00

## 2023-06-15 RX ADMIN — MORPHINE SULFATE 2 MG: 2 INJECTION, SOLUTION INTRAMUSCULAR; INTRAVENOUS at 02:25

## 2023-06-15 RX ADMIN — BUSPIRONE HYDROCHLORIDE 10 MG: 10 TABLET ORAL at 07:56

## 2023-06-15 RX ADMIN — OXYCODONE HYDROCHLORIDE 5 MG: 5 TABLET ORAL at 21:00

## 2023-06-15 RX ADMIN — THIAMINE HYDROCHLORIDE 200 MG: 100 INJECTION, SOLUTION INTRAMUSCULAR; INTRAVENOUS at 06:30

## 2023-06-15 RX ADMIN — Medication 2 PACKET: at 11:04

## 2023-06-15 RX ADMIN — OXYCODONE HYDROCHLORIDE 5 MG: 5 TABLET ORAL at 11:04

## 2023-06-15 RX ADMIN — PROPRANOLOL HYDROCHLORIDE 20 MG: 20 TABLET ORAL at 21:01

## 2023-06-15 NOTE — CASE MANAGEMENT/SOCIAL WORK
Continued Stay Note  [facility]     Patient Name: Mane Gunderson  MRN: [id number]  Today's Date: 6/15/2023    Admit Date: 6/14/2023    Plan: Plan home with parents.   CHRISTIANO Terry RN   Discharge Plan       Row Name 06/15/23 0839       Plan    Plan Plan home with parents.   CHRISTIANO Terry RN    Patient/Family in Agreement with Plan yes    Plan Comments FACE SHEET VERIFIED.  Spoke with pt at bedside.  Pt's PCP is JAMAL Campos. Pt lives in a single story house with his parents ( Dara Gunderson [phone] and Merrill).  Pt is independent with ADLs.  Pt denies using any DMEs.  Pt gets his prescriptions at [facility] ([facility]  Pt denies any issues affording his medications.  Pt is not current with HH.  Pt has been in [facility] for alcohol abuse.  Pt denies any discharge needs at present.  Pt's parents will transport him home and assist him as needed.  Plan home with parents.   CHRISTIANO Terry RN                   Discharge Codes    No documentation.                 Expected Discharge Date and Time       Expected Discharge Date Expected Discharge Time    Jun 21, 2023               Janet Terry RN

## 2023-06-15 NOTE — THERAPY DISCHARGE NOTE
Acute Care - Occupational Therapy Discharge  [facility]    Patient Name: Mane Gunderson  : 1985    MRN: [id number]                              Today's Date: 6/15/2023       Admit Date: 2023    Visit Dx:     ICD-10-CM ICD-9-CM   1. Alcoholic hepatitis, unspecified whether ascites present  K70.10 571.1   2. Acute on chronic pancreatitis  K85.90 577.0    K86.1 577.1     Patient Active Problem List   Diagnosis   • Appendicitis   • Ruptured appendicitis   • Alcohol-induced acute pancreatitis   • Anxiety   • Attention deficit hyperactivity disorder (ADHD)   • Bipolar I disorder   • Chronic back pain   • Hemorrhoids   • PTSD (post-traumatic stress disorder)   • Migraine headache   • Insomnia   • Alcohol withdrawal syndrome with perceptual disturbance   • Hypokalemia   • Hypomagnesemia   • Hematemesis with nausea   • Alcoholic hepatitis, unspecified whether ascites present     Past Medical History:   Diagnosis Date   • ADD (attention deficit disorder)    • Alcohol abuse    • Dyslexia    • Pancreatitis    • Posttraumatic stress disorder      Past Surgical History:   Procedure Laterality Date   • APPENDECTOMY N/A 8/10/2016    Procedure: APPENDECTOMY LAPAROSCOPIC;  Surgeon: Bird Vazquez Jr., MD;  Location: [facility] OR;  Service:       General Information     Row Name 06/15/23 0923          OT Time and Intention    Document Type discharge evaluation/summary  -CE     Mode of Treatment individual therapy;occupational therapy  -CE     Row Name 06/15/23 0923          General Information    Patient Profile Reviewed yes  -CE     Prior Level of Function independent:;ADL's;all household mobility  -CE     Existing Precautions/Restrictions no known precautions/restrictions  -CE     Row Name 06/15/23 0923          Living Environment    People in Home parent(s)  -CE     Row Name 06/15/23 0923          Stairs Within Home, Primary    Stairs, Within Home, Primary stairs to enter, pt unsure how many  -CE     Row Name  06/15/23 0923          Cognition    Orientation Status (Cognition) oriented x 4  -CE     Row Name 06/15/23 0923          Safety Issues, Functional Mobility    Impairments Affecting Function (Mobility) balance  -CE     Comment, Safety Issues/Impairments (Mobility) pt reporting acute dizziness and tunnel vision with sharp head turns  -CE           User Key  (r) = Recorded By, (t) = Taken By, (c) = Cosigned By    Initials Name Provider Type    CE Emma Collazo OT Occupational Therapist               Mobility/ADL's     Row Name 06/15/23 0925          Bed Mobility    Bed Mobility bed mobility (all) activities  -CE     All Activities, Orange (Bed Mobility) independent  -CE     Row Name 06/15/23 0925          Transfers    Transfers sit-stand transfer;toilet transfer  -CE     Row Name 06/15/23 0925          Sit-Stand Transfer    Sit-Stand Orange (Transfers) independent  -CE     Row Name 06/15/23 0925          Toilet Transfer    Type (Toilet Transfer) sit-stand;stand-sit  -CE     Orange Level (Toilet Transfer) independent  -CE     Row Name 06/15/23 0925          Functional Mobility    Functional Mobility- Comment Pt able to ambulate in room on level surface with supervision for safety. No LOB noted with turning.  -CE     Row Name 06/15/23 0925          Activities of Daily Living    BADL Assessment/Intervention lower body dressing;feeding  -CE     Row Name 06/15/23 0925          Lower Body Dressing Assessment/Training    Orange Level (Lower Body Dressing) don;shoes/slippers;set up  -CE     Position (Lower Body Dressing) edge of bed sitting  -CE     Row Name 06/15/23 0925          Self-Feeding Assessment/Training    Orange Level (Feeding) feeding skills;independent  -CE     Position (Self-Feeding) sitting up in bed  -CE           User Key  (r) = Recorded By, (t) = Taken By, (c) = Cosigned By    Initials Name Provider Type    CE Emma Collazo OT Occupational Therapist                Obj/Interventions     [facility] Name 06/15/23 0926          Sensory Assessment (Somatosensory)    Sensory Assessment (Somatosensory) sensation intact  -CE     Row Name 06/15/23 0926          Vision Assessment/Intervention    Visual Impairment/Limitations WFL  -CE     Row Name 06/15/23 0926          Range of Motion Comprehensive    General Range of Motion no range of motion deficits identified  -CE     Row Name 06/15/23 0926          Strength Comprehensive (MMT)    General Manual Muscle Testing (MMT) Assessment no strength deficits identified  -CE     Row Name 06/15/23 0926          Balance    Balance Assessment standing dynamic balance  -CE     Dynamic Standing Balance supervision;1-person assist  -CE     Position/Device Used, Standing Balance unsupported  -CE           User Key  (r) = Recorded By, (t) = Taken By, (c) = Cosigned By    Initials Name Provider Type    Emma Mcfarlane OT Occupational Therapist               Goals/Plan    No documentation.                Clinical Impression     Row Name 06/15/23 0926          Pain Assessment    Pretreatment Pain Rating 0/10 - no pain  -CE     Posttreatment Pain Rating 0/10 - no pain  -CE     Row Name 06/15/23 0926          Plan of Care Review    Plan of Care Reviewed With patient  -CE     Outcome Evaluation Pt seen for OT eval after admission with alcoholic hepatits and acute dizziness. Pt independent at baseline and able to mobilize in room with supervision for safety and without episode of dizziness with turning or transfers in bathroom. Pt reports dizziness and tunnel vision occur with sudden, moura movements of turning head laterally or looking up. Discussed with PT who may consider Epley Maneuver after neurology sees pt later today. Pt has no further acute OT needs at this time and OT will signoff now.  -CE     [facility] Name 06/15/23 0926          Therapy Assessment/Plan (OT)    Criteria for Skilled Therapeutic Interventions Met (OT) no problems identified which require  skilled intervention  -CE     Row Name 06/15/23 0926          Therapy Plan Review/Discharge Plan (OT)    Anticipated Discharge Disposition (OT) home  -CE     Row Name 06/15/23 0926          Vital Signs    O2 Delivery Pre Treatment room air  -CE     Pre Patient Position Supine  -CE     Intra Patient Position Standing  -CE     Post Patient Position Supine  -CE     Row Name 06/15/23 0926          Positioning and Restraints    Pre-Treatment Position in bed  -CE     Post Treatment Position bed  -CE     In Bed notified nsg;fowlers;encouraged to call for assist;call light within reach  -CE           User Key  (r) = Recorded By, (t) = Taken By, (c) = Cosigned By    Initials Name Provider Type    CE Emma Collazo, KATHERINE Occupational Therapist               Outcome Measures     Row Name 06/15/23 0929          How much help from another is currently needed...    Putting on and taking off regular lower body clothing? 4  -CE     Bathing (including washing, rinsing, and drying) 4  -CE     Toileting (which includes using toilet bed pan or urinal) 4  -CE     Putting on and taking off regular upper body clothing 4  -CE     Taking care of personal grooming (such as brushing teeth) 4  -CE     Eating meals 4  -CE     AM-PAC 6 Clicks Score (OT) 24  -CE     Row Name 06/15/23 0735 06/15/23 0016       How much help from another person do you currently need...    Turning from your back to your side while in flat bed without using bedrails? 4  -RW 4  -SA    Moving from lying on back to sitting on the side of a flat bed without bedrails? 4  -RW 4  -SA    Moving to and from a bed to a chair (including a wheelchair)? 4  -RW 4  -SA    Standing up from a chair using your arms (e.g., wheelchair, bedside chair)? 4  -RW 4  -SA    Climbing 3-5 steps with a railing? 3  -RW 3  -SA    To walk in hospital room? 4  -RW 4  -SA    AM-PAC 6 Clicks Score (PT) 23  -RW 23  -SA    Highest level of mobility 7 --> Walked 25 feet or more  -RW 7 --> Walked 25 feet  or more  -    Row Name 06/15/23 0929          Functional Assessment    Outcome Measure Options AM-PAC 6 Clicks Daily Activity (OT)  -CE           User Key  (r) = Recorded By, (t) = Taken By, (c) = Cosigned By    Initials Name Provider Type    RW Johnnie Carpio, RN Registered Nurse    Bebo Jules, RN Registered Nurse    Emma Mcfarlane OT Occupational Therapist              Occupational Therapy Education     Title: PT OT SLP Therapies (Done)     Topic: Occupational Therapy (Done)     Point: ADL training (Done)     Description:   Instruct learner(s) on proper safety adaptation and remediation techniques during self care or transfers.   Instruct in proper use of assistive devices.              Learning Progress Summary           Patient Acceptance, E, VU by CE at 6/15/2023 0930                   Point: Precautions (Done)     Description:   Instruct learner(s) on prescribed precautions during self-care and functional transfers.              Learning Progress Summary           Patient Acceptance, E, VU by CE at 6/15/2023 0930                               User Key     Initials Effective Dates Name Provider Type Discipline    CE 10/17/22 -  Emma Collazo OT Occupational Therapist OT              OT Recommendation and Plan     Plan of Care Review  Plan of Care Reviewed With: patient  Outcome Evaluation: Pt seen for OT eval after admission with alcoholic hepatits and acute dizziness. Pt independent at baseline and able to mobilize in room with supervision for safety and without episode of dizziness with turning or transfers in bathroom. Pt reports dizziness and tunnel vision occur with sudden, moura movements of turning head laterally or looking up. Discussed with PT who may consider Epley Maneuver after neurology sees pt later today. Pt has no further acute OT needs at this time and OT will signoff now.  Plan of Care Reviewed With: patient  Outcome Evaluation: Pt seen for OT eval after admission with  alcoholic hepatits and acute dizziness. Pt independent at baseline and able to mobilize in room with supervision for safety and without episode of dizziness with turning or transfers in bathroom. Pt reports dizziness and tunnel vision occur with sudden, moura movements of turning head laterally or looking up. Discussed with PT who may consider Epley Maneuver after neurology sees pt later today. Pt has no further acute OT needs at this time and OT will signoff now.     Time Calculation:    Time Calculation- OT     Row Name 06/15/23 0930             Time Calculation- OT    OT Start Time 0826  -CE      OT Stop Time 0841  -CE      OT Time Calculation (min) 15 min  -CE      OT Received On 06/15/23  -CE            User Key  (r) = Recorded By, (t) = Taken By, (c) = Cosigned By    Initials Name Provider Type    Emma Mcfarlane OT Occupational Therapist              Therapy Charges for Today     Code Description Service Date Service Provider Modifiers Qty    [id number]  OT EVAL LOW COMPLEXITY 2 6/15/2023 Emma Collazo OT GO 1             OT Discharge Summary  Anticipated Discharge Disposition (OT): home    Emma Collazo OT  6/15/2023

## 2023-06-15 NOTE — PLAN OF CARE
Goal Outcome Evaluation:  Plan of Care Reviewed With: patient           Outcome Evaluation: Cont with IVF.  Neuro consulted.  EEG pending.  MRI of brain ordered.  Access saw pt and Psych was consulted.  Pt is very hiper sensitive and has to talk excessively when someone is in the room.  BP and HR are better.  Clonidine, Oxy and Atarax given today with a positive reaction.  Will cont to monitor. Librium started today as well.

## 2023-06-15 NOTE — CASE MANAGEMENT/SOCIAL WORK
Discharge Planning Assessment  [facility]     Patient Name: Mane Gunderson  MRN: [id number]  Today's Date: 6/15/2023    Admit Date: 6/14/2023    Plan: Plan home with parents.   CHRISTIANO Terry RN   Discharge Needs Assessment       Row Name 06/15/23 0837       Living Environment    People in Home parent(s)    Name(s) of People in Home Mother  ( Dara Gunderson  [phone]) and Father  ( Merrill)    Current Living Arrangements home    Primary Care Provided by self    Provides Primary Care For no one    Family Caregiver if Needed parent(s)    Family Caregiver Names Mother ( Dara Gunderson [phone]) and Father ( Merrill)    Quality of Family Relationships helpful    Able to Return to Prior Arrangements yes    Living Arrangement Comments Pt lives in a single story house with his parents ( Dara Gunderson [phone] and Merrill).       Resource/Environmental Concerns    Resource/Environmental Concerns none    Transportation Concerns none       Transition Planning    Patient/Family Anticipates Transition to home with family    Patient/Family Anticipated Services at Transition none    Transportation Anticipated family or friend will provide       Discharge Needs Assessment    Equipment Currently Used at Home none    Concerns to be Addressed no discharge needs identified;denies needs/concerns at this time    Anticipated Changes Related to Illness none    Equipment Needed After Discharge none                   Discharge Plan       Row Name 06/15/23 0839       Plan    Plan Plan home with parents.   CHRISTIANO Terry RN    Patient/Family in Agreement with Plan yes    Plan Comments FACE SHEET VERIFIED.  Spoke with pt at bedside.  Pt's PCP is JAMAL Campos. Pt lives in a single story house with his parents ( Dara Gunderson [phone] and Merrill).  Pt is independent with ADLs.  Pt denies using any DMEs.  Pt gets his prescriptions at [facility]  Pt denies any issues affording his medications.  Pt is not current with  HH.  Pt has been in [facility] for alcohol abuse.  Pt denies any discharge needs at present.  Pt's parents will transport him home and assist him as needed.  Plan home with parents.   CHRISTIANO Terry RN                  Continued Care and Services - Admitted Since 6/14/2023    Coordination has not been started for this encounter.       Expected Discharge Date and Time       Expected Discharge Date Expected Discharge Time    Jun 21, 2023            Demographic Summary       Row Name 06/15/23 0837       General Information    Admission Type inpatient    Arrived From emergency department    Referral Source admission list    Reason for Consult discharge planning    Preferred Language English                   Functional Status       Row Name 06/15/23 0837       Functional Status    Usual Activity Tolerance good    Current Activity Tolerance moderate       Functional Status, IADL    Medications independent    Meal Preparation assistive person    Housekeeping assistive person    Laundry assistive person    Shopping assistive person       Mental Status    General Appearance WDL WDL                   Psychosocial    No documentation.                  Abuse/Neglect    No documentation.                  Legal    No documentation.                  Substance Abuse    No documentation.                  Patient Forms    No documentation.                     Janet Terry, RN

## 2023-06-15 NOTE — PROGRESS NOTES
Name: Mane Gunderson ADMIT: 2023   : 1985  PCP: Dominique Ontiveros APRN    MRN: [id number] LOS: 1 days   AGE/SEX: 38 y.o. male  ROOM: [facility]     Subjective   Subjective   Laying in bed.  Complains of abdominal pain, and dizziness movement, especially moving his head. Abdominal pain unchanged, denies nausea or vomiting.    Review of Systems   As above  Objective   Objective   Vital Signs  Temp:  [97.6 °F (36.4 °C)-98.9 °F (37.2 °C)] 97.6 °F (36.4 °C)  Heart Rate:  [] 90  Resp:  [16-18] 16  BP: (139-159)/() 159/108  SpO2:  [95 %-99 %] 97 %  on   ;   Device (Oxygen Therapy): room air  Body mass index is 27.62 kg/m².  Physical Exam    General: Sitting up in the bed, not in distress, ill-appearing,  HEENT: Normocephalic, atraumatic  CV: tachcyardiac no murmurs  Lungs: Clear to auscultation bilaterally, no crackles or wheezes  Abdomen: Soft, mildly distended, tender to palpation,  Extremities: No significant peripheral edema , no cyanosis   Skin: Jaundiced    Results Review     I reviewed the patient's new clinical results.  Results from last 7 days   Lab Units 06/15/23  0538 23  0955   WBC 10*3/mm3 5.74 6.81   HEMOGLOBIN g/dL 13.9 15.8   PLATELETS 10*3/mm3 119* 144     Results from last 7 days   Lab Units 06/15/23  0538 23  0955   SODIUM mmol/L 133* 126*   POTASSIUM mmol/L 3.7 3.4*   CHLORIDE mmol/L 95* 85*   CO2 mmol/L 29.0 28.7   BUN mg/dL 8 19   CREATININE mg/dL 0.98 0.77   GLUCOSE mg/dL 98 148*   Estimated Creatinine Clearance: 123.8 mL/min (by C-G formula based on SCr of 0.98 mg/dL).  Results from last 7 days   Lab Units 06/15/23  0538 23  0955   ALBUMIN g/dL 4.0 4.3   BILIRUBIN mg/dL 9.0* 12.6*   ALK PHOS U/L 238* 300*   AST (SGOT) U/L 422* 566*   ALT (SGPT) U/L 343* 451*     Results from last 7 days   Lab Units 06/15/23  0538 23  0955   CALCIUM mg/dL 8.6 9.9   ALBUMIN g/dL 4.0 4.3   MAGNESIUM mg/dL 2.3  --    PHOSPHORUS mg/dL 2.1*  --      Results from last 7 days    Lab Units 06/14/23  0955   LACTATE mmol/L 1.4     COVID19   Date Value Ref Range Status   04/25/2022 Not Detected Not Detected - Ref. Range Final   09/10/2021 Not Detected Not Detected - Ref. Range Final     No results found for: HGBA1C, POCGLU        CT Head Without Contrast  Narrative: CT OF THE BRAIN WITHOUT CONTRAST 06/14/2023     HISTORY: Mental status change. Dizziness.     Axial images were obtained through the brain without intravenous  contrast.     The brain parenchyma and ventricular system appear within normal limits.  No mass lesions, midline shift, intracranial hemorrhage or evidence of  infarction is demonstrated.     Impression: No acute process identified.           Radiation dose reduction techniques were utilized, including automated  exposure control and exposure modulation based on body size.     This report was finalized on 6/15/2023 10:41 AM by Dr. Issac Rodriguez M.D.       Scheduled Medications  busPIRone, 10 mg, Oral, TID  chlordiazePOXIDE, 25 mg, Oral, TID  folic acid, 1 mg, Oral, Daily  mirtazapine, 15 mg, Oral, Nightly  pantoprazole, 40 mg, Oral, BID AC  propranolol, 20 mg, Oral, TID  senna-docusate sodium, 2 tablet, Oral, BID  thiamine (B-1) IV, 200 mg, Intravenous, Q8H   Followed by  [START ON 6/20/2023] thiamine, 100 mg, Oral, Daily  venlafaxine XR, 225 mg, Oral, Daily    Infusions  sodium chloride 0.9 % with KCl 20 mEq, 100 mL/hr, Last Rate: 100 mL/hr (06/15/23 0500)    Diet  Diet: Cardiac Diets; Healthy Heart (2-3 Na+); Texture: Regular Texture (IDDSI 7); Fluid Consistency: Thin (IDDSI 0)    I have personally reviewed     [x]  Laboratory   []  Microbiology   [x]  Radiology   [x]  EKG/Telemetry  [x]  Cardiology/Vascular   []  Pathology    [x]  Records       Assessment/Plan     Active Hospital Problems    Diagnosis  POA    **Alcoholic hepatitis, unspecified whether ascites present [K70.10]  Yes    Anxiety [F41.9]  Yes    Bipolar I disorder [F31.9]  Yes      Resolved Hospital  Problems   No resolved problems to display.     Mr. Gunderson is a 36 y.o. with a history of alcohol dependence, pancreatitis presents today with complaints of dizziness, abdominal pain , vomiting, diarrhea, and cough.      Alcohol use disorder/alcoholic hepatitis:  -CT abdomen and pelvis 06/14-1. Improved findings of pancreatitis, with possible residual orrecurrent pancreatitis demonstrated2. Hepatic steatosis  3. Additional incidental findings as above.  -Continue IV fluids, antiemetics, pain management  -LFTs improving  -Access following  -Avoid Tylenol  To daily BMP  -History of allergies to Ativan, initiated on chlordiazepoxide, Monitor CIWA  GI consult for alcoholic hepatitis      Dizziness.  Associated with movement, concern for vertigo, neurology consulted.        SCDs for DVT prophylaxis.  Full code.  Discussed with patient.  Discussed with RN  Anticipate discharge  timing yet to be determined.      Copied text in this note has been reviewed and is accurate as of 06/15/23.         Dictated utilizing Dragon dictation        Alfredo Torres MD  [facility]  06/15/23  11:23 EDT

## 2023-06-15 NOTE — PAYOR COMM NOTE
"Reji Otero (38 y.o. Male)      PLEASE SEE FOR INPATIENT AUTHORIZATION:  REF# [id number]    PLEASE REPLY TO UR DEPT: [phone],  [phone]    [facility]: NPI [id number]  [facility]# [id number]    ALFREDO MURGUIA MD: NPI [id number]                  Date of Birth   1985    Social Security Number       Address   [address] [place] [address]    Home Phone   [phone]    MRN   [id number]       Sikh   Restorationism    Marital Status   Single                            Admission Date   6/14/23    Admission Type   Emergency    Admitting Provider   Maliha Cardoza MD    Attending Provider   Alfredo Murguia MD    Department, Room/Bed   [facility], E668/1       Discharge Date       Discharge Disposition       Discharge Destination                                 Attending Provider: Alfredo Murguia MD    Allergies: Ativan [Lorazepam], Haldol [Haloperidol Lactate], Codeine    Isolation: None   Infection: None   Code Status: CPR    Ht: 175.3 cm (69\")   Wt: 84.8 kg (187 lb)    Admission Cmt: None   Principal Problem: Alcoholic hepatitis, unspecified whether ascites present [K70.10]                   Active Insurance as of 6/14/2023       Primary Coverage       Payor Plan Insurance Group Employer/Plan Group    [facility] [employer] [facility] BLUE CROSS BLUE [facility] PPO [id number]       Payor Plan Address Payor Plan Phone Number Payor Plan Fax Number Effective Dates    [address] [phone]  8/31/2018 - None Entered    [place] [address]         Subscriber Name Subscriber Birth Date Member ID       REJI OTERO 1985 [id number]                     Emergency Contacts        (Rel.) Home Phone Work Phone Mobile Phone    Dara Otero (Mother) [phone] -- --                 History & Physical        Maliha Cardoza MD at 06/14/23 1952          HISTORY AND PHYSICAL   [facility]        Date of " Admission: 2023  Patient Identification:  Name: Mane Gunderson  Age: 38 y.o.  Sex: male  :  1985  MRN: [id number]                     Primary Care Physician: Dominique Ontiveros APRN    Chief Complaint:  38 year old gentleman who presented to the emergency room with dizziness, balance issues and buzzing in his ears; he notes that when he turns his head quickly he gets dizzy; he has a history of alcohol dependence and says he stopped drinking two days ago; he has been jaundiced; he denies fever or chills    History of Present Illness:  As above    Past Medical History:  Past Medical History:   Diagnosis Date    ADD (attention deficit disorder)     Alcohol abuse     Dyslexia     Pancreatitis     Posttraumatic stress disorder      Past Surgical History:  Past Surgical History:   Procedure Laterality Date    APPENDECTOMY N/A 8/10/2016    Procedure: APPENDECTOMY LAPAROSCOPIC;  Surgeon: Bird Vazquez Jr., MD;  Location: [facility];  Service:       Home Meds:  Medications Prior to Admission   Medication Sig Dispense Refill Last Dose    amphetamine-dextroamphetamine (ADDERALL) 10 MG tablet Take 1.5 tablets by mouth 2 (Two) Times a Day.       busPIRone (BUSPAR) 10 MG tablet Take 1 tablet by mouth 3 (Three) Times a Day.       cloNIDine (CATAPRES) 0.1 MG tablet Take 1 tablet by mouth 2 (Two) Times a Day As Needed. PRN       cyclobenzaprine (FLEXERIL) 10 MG tablet Take 1 tablet by mouth 3 (Three) Times a Day As Needed for Muscle Spasms.       fluticasone (FLONASE) 50 MCG/ACT nasal spray 2 sprays into the nostril(s) as directed by provider Daily.       hydrOXYzine pamoate (VISTARIL) 50 MG capsule Take 1 capsule by mouth 4 (Four) Times a Day As Needed.       methocarbamol (ROBAXIN) 750 MG tablet Take 1 tablet by mouth 3 (Three) Times a Day.       mirtazapine (REMERON) 15 MG tablet Take 1 tablet by mouth Every Night.       omeprazole (priLOSEC) 20 MG capsule Take 1 capsule by mouth Daily.       Pramoxine HCl,  Perianal, 1 % foam Insert  into the rectum Every 2 (Two) Hours As Needed for Hemorrhoids.       propranolol (INDERAL) 20 MG tablet Take 1 tablet by mouth 3 (Three) Times a Day.       venlafaxine XR (EFFEXOR-XR) 150 MG 24 hr capsule Take 225 mg by mouth Every Morning.       hyoscyamine sulfate (ANASPAZ) 0.125 MG tablet dispersible disintegrating tablet Place 125 mcg on the tongue Every 4 (Four) Hours As Needed.       L-Methylfolate-Algae (DEPLIN 7.5 PO) Take 1 capsule by mouth Daily.          Allergies:  Allergies   Allergen Reactions    Ativan [Lorazepam] Mental Status Change    Haldol [Haloperidol Lactate] Other (See Comments)     seizure    Codeine Itching     Immunizations:  Immunization History   Administered Date(s) Administered    COVID-19 (PFIZER) Purple Cap Monovalent 04/21/2021, 05/12/2021     Social History:   Social History     Social History Narrative    Not on file     Social History     Socioeconomic History    Marital status: Single   Tobacco Use    Smoking status: Every Day     Packs/day: 1.00     Types: Electronic Cigarette, Cigarettes    Smokeless tobacco: Never   Vaping Use    Vaping Use: Every day   Substance and Sexual Activity    Alcohol use: Yes     Comment: ONE PINT DAILY    Drug use: No    Sexual activity: Defer       Family History:  Family History   Problem Relation Age of Onset    Obesity Other         Review of Systems  See history of present illness and past medical history.  Patient denies headache, syncope, falls, trauma, change in vision, change in hearing, change in taste, changes in weight, changes in appetite, focal weakness, numbness, or paresthesia.  Patient denies chest pain, palpitations, dyspnea, orthopnea, PND, cough, sinus pressure, rhinorrhea, epistaxis, hemoptysis,hematemesis, diarrhea, constipation or hematochezia.  Denies cold or heat intolerance, polydipsia, polyuria, polyphagia. Denies hematuria, pyuria, dysuria, hesitancy, frequency or urgency. Denies consumption of  "raw and under cooked meats foods or change in water source.  Denies fever, chills, sweats, night sweats.  Denies missing any routine medications. Remainder of ROS is negative.    Objective:  T Max 24 hrs: Temp (24hrs), Av.7 °F (37.1 °C), Min:98 °F (36.7 °C), Max:99 °F (37.2 °C)    Vitals Ranges:   Temp:  [98 °F (36.7 °C)-99 °F (37.2 °C)] 98 °F (36.7 °C)  Heart Rate:  [] 122  Resp:  [16-18] 18  BP: (150-160)/() 159/111      Exam:  BP (!) 159/111 (BP Location: Left arm, Patient Position: Lying)   Pulse (!) 122   Temp 98 °F (36.7 °C) (Oral)   Resp 18   Ht 175.3 cm (69\")   Wt 74.8 kg (165 lb)   SpO2 98%   BMI 24.37 kg/m²     General Appearance:    Alert, cooperative, no distress, appears stated age; jaundiced   Head:    Normocephalic, without obvious abnormality, atraumatic   Eyes:    PERRL, conjunctivae/corneas clear, EOM's intact, both eyes   Ears:    Normal external ear canals, both ears   Nose:   Nares normal, septum midline, mucosa normal, no drainage    or sinus tenderness   Throat:   Lips, mucosa, and tongue normal   Neck:   Supple, symmetrical, trachea midline, no adenopathy;     thyroid:  no enlargement/tenderness/nodules; no carotid    bruit or JVD   Back:     Symmetric, no curvature, ROM normal, no CVA tenderness   Lungs:     Clear to auscultation bilaterally, respirations unlabored   Chest Wall:    No tenderness or deformity    Heart:    Regular rate and rhythm, S1 and S2 normal, no murmur, rub   or gallop   Abdomen:     Soft, nontender, bowel sounds active all four quadrants,     no masses, no hepatomegaly, no splenomegaly   Extremities:   Extremities normal, atraumatic, no cyanosis or edema   Pulses:   2+ and symmetric all extremities   Skin:   Skin color, texture, turgor normal, no rashes or lesions               .    Data Review:  Labs in chart were reviewed.  WBC   Date Value Ref Range Status   2023 6.81 3.40 - 10.80 10*3/mm3 Final     Hemoglobin   Date Value Ref Range " Status   06/14/2023 15.8 13.0 - 17.7 g/dL Final     Hematocrit   Date Value Ref Range Status   06/14/2023 44.3 37.5 - 51.0 % Final     Platelets   Date Value Ref Range Status   06/14/2023 144 140 - 450 10*3/mm3 Final     Sodium   Date Value Ref Range Status   06/14/2023 126 (L) 136 - 145 mmol/L Final     Potassium   Date Value Ref Range Status   06/14/2023 3.4 (L) 3.5 - 5.2 mmol/L Final     Comment:     Slight hemolysis detected by analyzer. Results may be affected.     Chloride   Date Value Ref Range Status   06/14/2023 85 (L) 98 - 107 mmol/L Final     CO2   Date Value Ref Range Status   06/14/2023 28.7 22.0 - 29.0 mmol/L Final     BUN   Date Value Ref Range Status   06/14/2023 19 6 - 20 mg/dL Final     Creatinine   Date Value Ref Range Status   06/14/2023 0.77 0.76 - 1.27 mg/dL Final     Glucose   Date Value Ref Range Status   06/14/2023 148 (H) 65 - 99 mg/dL Final     Calcium   Date Value Ref Range Status   06/14/2023 9.9 8.6 - 10.5 mg/dL Final     AST (SGOT)   Date Value Ref Range Status   06/14/2023 566 (H) 1 - 40 U/L Final     ALT (SGPT)   Date Value Ref Range Status   06/14/2023 451 (H) 1 - 41 U/L Final     Alkaline Phosphatase   Date Value Ref Range Status   06/14/2023 300 (H) 39 - 117 U/L Final                Imaging Results (All)       Procedure Component Value Units Date/Time    CT Head Without Contrast [[id number]] Collected: 06/14/23 1104     Updated: 06/14/23 1104    Narrative:      CT OF THE BRAIN WITHOUT CONTRAST 06/14/2023     HISTORY: Mental status change. Dizziness.     Axial images were obtained through the brain without intravenous  contrast.     The brain parenchyma and ventricular system appear within normal limits.  No mass lesions, midline shift, intracranial hemorrhage or evidence of  infarction is demonstrated.       Impression:      No acute process identified.           Radiation dose reduction techniques were utilized, including automated  exposure control and exposure modulation based  on body size.          CT Abdomen Pelvis With Contrast [[id number]] Collected: 06/14/23 1054     Updated: 06/14/23 1100    Narrative:      EXAMINATION: CT OF THE ABDOMEN AND PELVIS WITH CONTRAST     TECHNIQUE: Computed tomography of the abdomen and pelvis after the  uneventful administration of nonionic intravenous contrast per protocol.  Radiation dose reduction techniques were utilized, including automated  exposure control and exposure modulation based on body size.      HISTORY: Nausea, vomiting meeting, and jaundice     COMPARISON: 04/25/2022     FINDINGS: Limited evaluation of the inferior thorax demonstrates no  consolidation, pleural effusion, or pneumothorax. The heart is normal in  size and configuration, without pericardial effusion.     There is hepatic steatosis with mild focal sparing near the gallbladder  fossa. There is interval near resolution of peripancreatic stranding  with mild residual pancreatic thickening along the body and with  heterogeneous appearance of the pancreatic parenchyma. The gallbladder  is distended. No biliary ductal dilation is seen. There is mild gastric  distention. The urinary bladder is distended.     The spleen, adrenal glands, kidneys, small bowel, large bowel, and  abdominal vasculature are normal. No intraperitoneal fluid collection or  free gas are seen. No enlarged lymph nodes are demonstrated.     Bone windows demonstrate degenerative changes, without suspicious  osseous lesion seen.       Impression:      1. Improved findings of pancreatitis, with possible residual or  recurrent pancreatitis demonstrated  2. Hepatic steatosis  3. Additional incidental findings as above.     This report was finalized on 6/14/2023 10:57 AM by Dr. Giacomo Jain M.D.       XR Chest 1 View [[id number]] Collected: 06/14/23 1042     Updated: 06/14/23 1046    Narrative:      PORTABLE CHEST X-RAY     HISTORY: Chest pain with dizziness, jaundice, and multiple episodes of  vomiting  "overnight..     Portable chest x-ray is provided. Correlation: Chest x-ray 08/27/2014.     FINDINGS: The cardiomediastinal silhouette is normal. The lungs are  clear. The costophrenic sulci are dry and the bones appear normal. There  is no pneumothorax. No pneumomediastinum.       Impression:      Negative.     This report was finalized on 6/14/2023 10:42 AM by Dr. Jonatan Wheat M.D.                 Assessment:  Active Hospital Problems    Diagnosis  POA    **Alcoholic hepatitis, unspecified whether ascites present [K70.10]  Yes      Resolved Hospital Problems   No resolved problems to display.   Alcohol dependence  Jaundice  Hypertension  Hypokalemia  pancreatitis    Plan:  Will continue fluids  Replace potassium  Monitor on telemetry  Ask access to see him  Clear liquid diet  Pain meds  Still with pancreatitis on ct  Dw patient and nurse    Maliha Cardoza MD  6/14/2023  19:52 EDT      Electronically signed by Maliha Cardoza MD at 06/14/23 1958          Emergency Department Notes        Caroline Parisi RN at 06/14/23 1321          Call placed to [facility] for patient transport to [facility]. Dispatcher Francisca states ETA 2400.    Electronically signed by Caroline Parisi RN at 06/14/23 1322              Caroline Parisi RN at 06/14/23 1153          Dr. Coleman returned call at this time.     Electronically signed by Caroline Parisi RN at 06/14/23 1154       Caroline Parisi RN at 06/14/23 1150          Second call placed to [facility] Hospitalist at this time. Awaiting call back.     Electronically signed by Caroline Parisi RN at 06/14/23 1151       Luciana Alfredo RN at 06/14/23 1128          Pt reports he has been drinking \"the last few weeks\" and stopped Monday. Pt reports hx of withdraw shakes, but denies seizure. Mother at bedside at this time.    Luciana Alfredo RN      Electronically signed by Luciana Alfredo RN at 06/14/23 1130       Caroline Parisi RN at 06/14/23 1120        "   Call placed to hospitalist ([facility]) at this time. Awaiting call back.     Electronically signed by Caroline Parisi, RN at 06/14/23 1120       Luciana Alfredo, RN at 06/14/23 1034          Pt aware UA needed, fluids started and will attempt to void after      Electronically signed by Luciana Alfredo RN at 06/14/23 1035       Andrez Grimaldo MD at 06/14/23 0955          Subjective  History of Present Illness  The patient is a 38-year-old male with a past medical history of alcohol dependence, pancreatitis who presents with multiple complaints.  He states that over the last couple of days he has been having some dizziness and balance issues with turning the head.  He states that when he is standing still he is not dizzy but if he turns his head or changes positions he loses his balance and becomes dizzy.  He also reports a 3-week history of vomiting associate with some diarrhea and a 2-week history of cough.  He reports that he is a daily drinker and usually drinks about a pint of liquor per day.  He states his last drink was 2 to 3 days ago.  He states he was experiencing some withdrawal symptoms after he stopped drinking but states that these seem to be improving.  He reports some jaundice that has been going on for the last 3 days but worsened last night.  No fevers.  Review of Systems   Constitutional:  Negative for fever.   Respiratory:  Positive for cough.    Cardiovascular:  Negative for chest pain.   Gastrointestinal:  Positive for vomiting. Negative for diarrhea.   Skin:  Positive for color change. Negative for rash.   Neurological:  Positive for dizziness.   All other systems reviewed and are negative.    Past Medical History:   Diagnosis Date    ADD (attention deficit disorder)     Alcohol abuse     Dyslexia     Pancreatitis     Posttraumatic stress disorder        Allergies   Allergen Reactions    Ativan [Lorazepam] Mental Status Change    Haldol [Haloperidol Lactate] Other (See Comments)     seizure     Codeine Itching       Past Surgical History:   Procedure Laterality Date    APPENDECTOMY N/A 8/10/2016    Procedure: APPENDECTOMY LAPAROSCOPIC;  Surgeon: Bird Vazquez Jr., MD;  Location: [facility];  Service:        Family History   Problem Relation Age of Onset    Obesity Other        Social History     Socioeconomic History    Marital status: Single   Tobacco Use    Smoking status: Every Day     Packs/day: 1.00     Types: Electronic Cigarette, Cigarettes    Smokeless tobacco: Never   Vaping Use    Vaping Use: Every day   Substance and Sexual Activity    Alcohol use: Yes     Comment: ONE PINT DAILY    Drug use: No    Sexual activity: Defer           Objective  Physical Exam  Vitals reviewed.   Constitutional:       General: He is not in acute distress.     Appearance: He is well-developed. He is not ill-appearing.   Eyes:      General: Scleral icterus present.   Cardiovascular:      Rate and Rhythm: Normal rate and regular rhythm.      Heart sounds: Normal heart sounds.   Pulmonary:      Effort: No respiratory distress.      Breath sounds: Normal breath sounds.   Abdominal:      Palpations: Abdomen is soft.      Tenderness: There is no abdominal tenderness.   Musculoskeletal:         General: Normal range of motion.   Skin:     General: Skin is dry.      Coloration: Skin is jaundiced.   Neurological:      General: No focal deficit present.      Mental Status: He is alert. He is disoriented.      Cranial Nerves: No cranial nerve deficit.      Motor: No weakness.      Coordination: Coordination normal.      Comments: Disoriented to month (patient stated that the month was March)   Psychiatric:         Mood and Affect: Mood normal.       Procedures           ED Course  ED Course as of 06/14/23 1201   Wed Jun 14, 2023   1115 Labs reveal elevated liver enzymes and elevated bilirubin.  Lipase is also mildly elevated.  CT head unremarkable.  CT abdomen pelvis with possible residual or recurrent pancreatitis.  Will  transfer patient for admission for further work-up and management. [DH]   1116 Banana bag not available at this facility.  Discussed with pharmacist who will try to obtain from another facility while awaiting patient transport. [DH]      ED Course User Index  [DH] Andrez Grimaldo MD                        Medical Decision Making  Problems Addressed:  Acute on chronic pancreatitis: complicated acute illness or injury  Alcoholic hepatitis, unspecified whether ascites present: complicated acute illness or injury    Amount and/or Complexity of Data Reviewed  Labs: ordered.  Radiology: ordered.  ECG/medicine tests: ordered.    Risk  Prescription drug management.  Decision regarding hospitalization.    38-year-old male presents with multiple complaints.  Patient is somewhat confused on exam but without focal neurologic deficit.  Will check CT of the brain.  Patient is also noted to be jaundiced on exam.  Will check CT of the abdomen and pelvis.  Additionally, will obtain basic lab work including liver enzymes, hepatitis panel, ammonia level, alcohol level, and acetaminophen level.  Urine toxicology.  Patient has elevated blood pressure and is mildly tachycardic and recently discontinued drinking.  Will give Ativan for concern for possible alcohol withdrawal.  Anticipate admission for alcohol withdrawal, alcohol hepatitis.    Final diagnoses:   Alcoholic hepatitis, unspecified whether ascites present   Acute on chronic pancreatitis       ED Disposition  ED Disposition       ED Disposition   Decision to Admit    Condition   --    Comment   Level of Care: Telemetry [5]   Diagnosis: Alcoholic hepatitis, unspecified whether ascites present [2830401]   Admitting Physician: KATHERINE CLAY [[id number]]   Certification: I Certify That Inpatient Hospital Services Are Medically Necessary For Greater Than 2 Midnights                 No follow-up provider specified.       Medication List      No changes were made to your  prescriptions during this visit.           Electronically signed by Andrez Grimaldo MD at 06/14/23 1201       Vital Signs (last 2 days)       Date/Time Temp Temp src Pulse Resp BP Patient Position SpO2    06/15/23 0725 97.6 (36.4) Oral 90 16 159/108  Lying 97    BP: RN notified at 06/15/23 0725 06/14/23 2351 98.4 (36.9) Oral 105 16 146/100  -- 95    BP: i report the b/p to the nurse at 06/14/23 2351 06/14/23 1951 98 (36.7) Oral 122 18 139/99 -- 97    06/14/23 1725 98.9 (37.2) Oral 108 18 159/111 Lying 98    06/14/23 1605 98.8 (37.1) Oral 87 16 156/105 -- 97    06/14/23 1536 -- -- 84 -- -- -- 97    06/14/23 1530 -- -- 85 -- 156/101 -- 99    06/14/23 1400 -- -- 84 -- 154/97 -- 98    06/14/23 1249 -- -- 98 -- -- -- 99    06/14/23 1246 -- -- 84 -- -- -- 96    06/14/23 1230 -- -- 89 -- 150/107 -- 95    06/14/23 1103 -- -- 92 -- -- -- 96    06/14/23 1100 -- -- 94 -- 160/95 -- 93    06/14/23 1017 -- -- 100 -- -- -- 98    06/14/23 0915 99 (37.2) Oral 100 18 159/107 Lying 96          Oxygen Therapy (last 2 days)       Date/Time SpO2 Device (Oxygen Therapy) Flow (L/min) Oxygen Concentration (%) ETCO2 (mmHg)    06/15/23 0735 -- room air -- -- --    06/15/23 0725 97 room air -- -- --    06/15/23 0016 -- room air -- -- --    06/14/23 2351 95 -- -- -- --    06/14/23 2001 -- room air -- -- --    06/14/23 1951 97 -- -- -- --    06/14/23 1734 -- room air -- -- --    06/14/23 1725 98 room air -- -- --    06/14/23 1605 97 room air -- -- --    06/14/23 1230 95 -- -- -- --    06/14/23 1103 96 -- -- -- --    06/14/23 1100 93 -- -- -- --    06/14/23 1017 98 -- -- -- --    06/14/23 0915 96 room air -- -- --          Intake & Output (last 2 days)         06/13 0701 06/14 0700 06/14 0701  06/15 0700 06/15 0701  06/16 0700    P.O.  1050 240    I.V. (mL/kg)  1000 (11.8)     IV Piggyback  1150     Total Intake(mL/kg)  3200 (37.7) 240 (2.8)    Net  +3200 +240           Urine Unmeasured Occurrence  2 x           Lines, Drains & Airways        Active LDAs       Name Placement date Placement time Site Days    Peripheral IV 06/14/23 0917 Right Antecubital 06/14/23  0917  Antecubital  1                  CIWA (last 2 days)       Date/Time CIWA-Ar Score    06/15/23 1000 6    06/15/23 0735 3    06/15/23 0016 3    06/14/23 2001 3    06/14/23 1800 4       Medication Administration Report for Mane Gunderson as of 06/15/23 1234     Legend:    Given Hold Not Given Due Canceled Entry Other Actions    Time Time (Time) Time Time-Action         Discontinued     Completed     Future     MAR Hold     Linked             Medications 06/13/23 06/14/23 06/15/23      sennosides-docusate (PERICOLACE) 8.6-50 MG per tablet 2 tablet  Dose: 2 tablet  Freq: 2 Times Daily Route: PO  Start: 06/14/23 2100   Admin Instructions:   HOLD MEDICATION IF PATIENT HAS HAD BOWEL MOVEMENT. Start bowel management regimen if patient has not had a bowel movement after 12 hours.     2213-Given          0755-Given     0802-Canceled Entry     2100          And  polyethylene glycol (MIRALAX) packet 17 g  Dose: 17 g  Freq: Daily PRN Route: PO  PRN Reason: Constipation  PRN Comment: Use if senna-docusate is ineffective  Start: 06/14/23 1759   Admin Instructions:   Use if no bowel movement after 12 hours. Mix in 6-8 ounces of water.  Use 4-8 ounces of water, tea, or juice for each 17 gram dose.         And  bisacodyl (DULCOLAX) EC tablet 5 mg  Dose: 5 mg  Freq: Daily PRN Route: PO  PRN Reason: Constipation  PRN Comment: Use if polyethylene glycol is ineffective  Start: 06/14/23 1759   Admin Instructions:   Use if no bowel movement after 12 hours.  Swallow whole. Do not crush, split, or chew tablet.         And  bisacodyl (DULCOLAX) suppository 10 mg  Dose: 10 mg  Freq: Daily PRN Route: RE  PRN Reason: Constipation  PRN Comment: Use if bisacodyl oral is ineffective  Start: 06/14/23 1759   Admin Instructions:   Use if no bowel movement after 12 hours.  Hold for diarrhea          busPIRone (BUSPAR) tablet  "10 mg  Dose: 10 mg  Freq: 3 Times Daily Route: PO  Start: 06/14/23 2100   Admin Instructions:   Caution: Look alike/sound alike drug alert. Take with food.  Avoid grapefruit juice.     2212-Given          0756-Given     0802-Canceled Entry     1600 2100             Calcium Replacement - Follow Nurse / BPA Driven Protocol  Freq: As Needed Route: XX  PRN Reason: Other  Start: 06/14/23 1801   Admin Instructions:   Open Order & Select \"[facility] Electrolyte Replacement Protocol Algorithm\" to View Details          chlordiazePOXIDE (LIBRIUM) capsule 25 mg  Dose: 25 mg  Freq: 3 Times Daily Route: PO  Start: 06/15/23 1145   End: 06/22/23 0859   Admin Instructions:      Caution: Look alike/sound alike drug alert.      1145     1600     2100           cloNIDine (CATAPRES) tablet 0.1 mg  Dose: 0.1 mg  Freq: Every 4 Hours PRN Route: PO  PRN Reason: High Blood Pressure  PRN Comment: greater than 160  Start: 06/14/23 1759   Admin Instructions:   For systolic blood pressure greater than 160 mmHg  Caution: Look alike/sound alike drug alert.          folic acid (FOLVITE) tablet 1 mg  Dose: 1 mg  Freq: Daily Route: PO  Start: 06/14/23 1900     (1805)-Not Given [C]          0756-Given     0802-Canceled Entry            hydrOXYzine (ATARAX) tablet 25 mg  Dose: 25 mg  Freq: 3 Times Daily PRN Route: PO  PRN Reason: Itching  Start: 06/14/23 2244   Admin Instructions:   Caution: Look alike/sound alike drug alert      0127-Given     1104-Given            Magnesium Standard Dose Replacement - Follow Nurse / BPA Driven Protocol  Freq: As Needed Route: XX  PRN Reason: Other  Start: 06/14/23 1801   Admin Instructions:   Open Order & Select \"[facility] Electrolyte Replacement Protocol Algorithm\" to View Details          Magnesium Standard Dose Replacement - Follow Nurse / BPA Driven Protocol  Freq: As Needed Route: XX  PRN Reason: Other  Start: 06/14/23 1759   Admin Instructions:   Open Order & Select \"[facility] Electrolyte Replacement Protocol Algorithm\" to " View Details          melatonin tablet 3 mg  Dose: 3 mg  Freq: Nightly PRN Route: PO  PRN Reason: Sleep  Start: 06/14/23 1759 0225-Given             metoclopramide (REGLAN) injection 10 mg  Dose: 10 mg  Freq: Every 6 Hours PRN Route: IV  PRN Reason: Nausea  Start: 06/14/23 1956   Admin Instructions:   Not relieved with zofran  Doses of 10 mg or less can be given IV push undiluted over 1 to 2 minutes          mirtazapine (REMERON) tablet 15 mg  Dose: 15 mg  Freq: Nightly Route: PO  Start: 06/14/23 2100 2212-Given          2100             morphine injection 2 mg  Dose: 2 mg  Freq: Every 4 Hours PRN Route: IV  PRN Reason: Moderate Pain  Start: 06/14/23 1956   End: 06/24/23 1955   Admin Instructions:   If given for pain, use the following pain scale:  Mild Pain = Pain Score of 1-3, CPOT 1-2  Moderate Pain = Pain Score of 4-6, CPOT 3-4  Severe Pain = Pain Score of 7-10, CPOT 5-8     2203-Given          0225-Given     0630-Given            ondansetron (ZOFRAN) tablet 4 mg  Dose: 4 mg  Freq: Every 6 Hours PRN Route: PO  PRN Reasons: Nausea,Vomiting  Start: 06/14/23 1759 1828-Not Given:  See Alt             Or  ondansetron (ZOFRAN) injection 4 mg  Dose: 4 mg  Freq: Every 6 Hours PRN Route: IV  PRN Reasons: Nausea,Vomiting  Start: 06/14/23 1759 1828-Given              oxyCODONE (ROXICODONE) immediate release tablet 5 mg  Dose: 5 mg  Freq: Every 6 Hours PRN Route: PO  PRN Reason: Moderate Pain  Start: 06/15/23 1048   Admin Instructions:   Based on patient request - if ordered for moderate or severe pain, provider allows for administration of a medication prescribed for a lower pain scale.        If given for pain, use the following pain scale:  Mild Pain = Pain Score of 1-3, CPOT 1-2  Moderate Pain = Pain Score of 4-6, CPOT 3-4  Severe Pain = Pain Score of 7-10, CPOT 5-8      1104-Given [C]             Phosphorus Replacement - Follow Nurse / BPA Driven Protocol  Freq: As Needed Route: XX  PRN Reason:  "Other  Start: 06/15/23 0909   Admin Instructions:   Open Order & Select \"[facility] Electrolyte Replacement Protocol Algorithm\" to View Details          Potassium Replacement - Follow Nurse / BPA Driven Protocol  Freq: As Needed Route: XX  PRN Reason: Other  Start: 06/14/23 1801   Admin Instructions:   Open Order & Select \"[facility] Electrolyte Replacement Protocol Algorithm\" to View Details          propranolol (INDERAL) tablet 20 mg  Dose: 20 mg  Freq: 3 Times Daily Route: PO  Start: 06/14/23 2100     2212-Given          0756-Given     0802-Canceled Entry     1600       2100             sodium chloride 0.9 % with KCl 20 mEq/L infusion  Rate: 100 mL/hr Dose: 100 mL/hr  Freq: Continuous Route: IV  Start: 06/14/23 1900     1828-New Bag     1829-Canceled Entry         0500-New Bag             thiamine (B-1) injection 200 mg  Dose: 200 mg  Freq: Every 8 Hours Scheduled Route: IV  Start: 06/14/23 2200   End: 06/19/23 2159   Admin Instructions:   Doses of up to 250mg, give over 1-2 minutes (IV push). Doses 250mg and above, give over 30 minutes.     2213-Given          0630-Given     1400     2200          Followed by  thiamine (VITAMIN B-1) tablet 100 mg  Dose: 100 mg  Freq: Daily Route: PO  Start: 06/20/23 0900          venlafaxine XR (EFFEXOR-XR) 24 hr capsule 225 mg  Dose: 225 mg  Freq: Daily Route: PO  Start: 06/15/23 0900   Admin Instructions:   Swallow whole; do not crush or chew.      0756-Given     0802-Canceled Entry           Future Medications  Medications 06/13/23 06/14/23 06/15/23       pantoprazole (PROTONIX) EC tablet 40 mg  Dose: 40 mg  Freq: 2 Times Daily Before Meals Route: PO  Start: 06/15/23 1730   Admin Instructions:   Swallow whole; do not crush, split, or chew.      1730            Completed Medications  Medications 06/13/23 06/14/23 06/15/23       folic acid 1 mg in sodium chloride 0.9 % 50 mL IVPB  Dose: 1 mg  Freq: Once Route: IV  Start: 06/14/23 1245   End: 06/14/23 1535   Admin Instructions:   Protect from " "light.     1449-New Bag     1535-Stopped             iopamidol (ISOVUE-370) 76 % injection 100 mL  Dose: 100 mL  Freq: Once in Imaging Route: IV  Start: 06/14/23 1115   End: 06/14/23 1046     1046-Given [C]              metoclopramide (REGLAN) injection 10 mg  Dose: 10 mg  Freq: Once Route: IV  Start: 06/14/23 1330   End: 06/14/23 1314   Admin Instructions:   Doses of 10 mg or less can be given IV push undiluted over 1 to 2 minutes     1314-Given              morphine injection 4 mg  Dose: 4 mg  Freq: Once Route: IV  Start: 06/14/23 1115   End: 06/14/23 1054   Admin Instructions:   Based on patient request - if ordered for moderate or severe pain, provider allows for administration of a medication prescribed for a lower pain scale.  If given for pain, use the following pain scale:  Mild Pain = Pain Score of 1-3, CPOT 1-2  Moderate Pain = Pain Score of 4-6, CPOT 3-4  Severe Pain = Pain Score of 7-10, CPOT 5-8     1054-Given              ondansetron (ZOFRAN) injection 4 mg  Dose: 4 mg  Freq: Once Route: IV  Start: 06/14/23 1115   End: 06/14/23 1054   Admin Instructions:   \"If multiple N/V medications ordered, use in the following order: Ondansetron, Prochlorperazine, Promethazine. Use PO unless patient refuses or patient unable to swallow.\"       1054-Given              potassium & sodium phosphates (PHOS-NAK) oral packet  Dose: 2 packet  Freq: Once Route: PO  Start: 06/15/23 1015   End: 06/15/23 1104   Admin Instructions:   Take with full glass of water      1104-Given             sodium chloride 0.9 % bolus 1,000 mL  Dose: 1,000 mL  Freq: Once Route: IV  Start: 06/14/23 1000   End: 06/14/23 1310     1021-New Bag     1310-Stopped             sodium chloride 0.9 % infusion 1,000 mL  Dose: 1,000 mL  Freq: Once Route: IV  Start: 06/14/23 1245   End: 06/14/23 1536     1314-New Bag     1536-Stopped             thiamine (B-1) 200 mg in sodium chloride 0.9 % 100 mL IVPB  Dose: 200 mg  Freq: Once Route: IV  Start: 06/14/23 " 1245   End: 06/14/23 1449   Admin Instructions:   Protect from light.     1413-New Bag     1449-Stopped            Discontinued Medications  Medications 06/13/23 06/14/23 06/15/23       acetaminophen (TYLENOL) tablet 650 mg  Dose: 650 mg  Freq: Every 4 Hours PRN Route: PO  PRN Reason: Mild Pain  Start: 06/14/23 1759   End: 06/15/23 1046   Admin Instructions:   Do not exceed 4 grams of acetaminophen in a 24 hr period.    If given for pain, use the following pain scale:   Mild Pain = Pain Score of 1-3, CPOT 1-2  Moderate Pain = Pain Score of 4-6, CPOT 3-4  Severe Pain = Pain Score of 7-10, CPOT 5-8  Do not exceed 4 grams of acetaminophen in a 24 hr period. Max dose of 2gm for AST/ALT greater than 120 units/L    If given for fever, use fever parameter: fever greater than 100.4 °F.    If given for pain, use the following pain scale:   Mild Pain = Pain Score of 1-3, CPOT 1-2  Moderate Pain = Pain Score of 4-6, CPOT 3-4  Severe Pain = Pain Score of 7-10, CPOT 5-8          LORazepam (ATIVAN) tablet 1 mg  Dose: 1 mg  Freq: Every 1 Hour PRN Route: PO  PRN Reason: Withdrawal  PRN Comment: For CIWA-Ar 8-10  Start: 06/15/23 0707   End: 06/15/23 0726   Admin Instructions:   Reassess 1 Hour After Administration     Caution: Look alike/sound alike drug alert         Or  LORazepam (ATIVAN) injection 1 mg  Dose: 1 mg  Freq: Every 1 Hour PRN Route: IV  PRN Reason: Withdrawal  PRN Comment: For CIWA-Ar 8-10  Start: 06/15/23 0707   End: 06/15/23 0726   Admin Instructions:   Reassess 1 Hour After Administration     Caution: Look alike/sound alike drug alert. Dilute 1:1 with normal saline.         Or  LORazepam (ATIVAN) tablet 2 mg  Dose: 2 mg  Freq: Every 1 Hour PRN Route: PO  PRN Reason: Withdrawal  PRN Comment: For CIWA-Ar 11-15 or -160, DBP , -125  Start: 06/15/23 0707   End: 06/15/23 0726   Admin Instructions:   Reassess 1 Hour After Administration.     Caution: Look alike/sound alike drug alert          Or  LORazepam (ATIVAN) injection 2 mg  Dose: 2 mg  Freq: Every 1 Hour PRN Route: IV  PRN Reason: Withdrawal  PRN Comment: For CIWA-Ar 11-15 or -160, DBP , -125  Start: 06/15/23 0707   End: 06/15/23 0726   Admin Instructions:   Reassess 1 Hour After Administration     Caution: Look alike/sound alike drug alert. Dilute 1:1 with normal saline.         Or  LORazepam (ATIVAN) injection 2 mg  Dose: 2 mg  Freq: Every 15 Minutes PRN Route: IV  PRN Reason: Anxiety  PRN Comment: For CIWA-Ar Greater Than 15 or SBP greater than 160, DBP greater than 110, or HR greater than 125.  Start: 06/15/23 0707   End: 06/15/23 0726   Admin Instructions:   Reassess 15 Minutes After Each Administration.  If CIWA-Ar Does Not Decrease Contact Provider To Discuss Transfer to Higher Level of Care.     Caution: Look alike/sound alike drug alert. Dilute 1:1 with normal saline.         Or  LORazepam (ATIVAN) injection 2 mg  Dose: 2 mg  Freq: Every 15 Minutes PRN Route: IM  PRN Reason: Withdrawal  PRN Comment: For CIWA-Ar Greater Than 15 or SBP greater than 160, DBP greater than 110, or HR greater than 125.  Start: 06/15/23 0707   End: 06/15/23 0726   Admin Instructions:   Reassess 15 Minutes After Each Administration.  If CIWA-Ar Does Not Decrease Contact Provider To Discuss Transfer to Higher Level of Care.     Caution: Look alike/sound alike drug alert. Dilute 1:1 with normal saline.          LORazepam (ATIVAN) injection 2 mg  Dose: 2 mg  Freq: Once Route: IV  Start: 06/14/23 1000   End: 06/14/23 1019   Admin Instructions:      Caution: Look alike/sound alike drug alert. Dilute 1:1 with normal saline.     (1019)-Not Given              LORazepam (ATIVAN) tablet 2 mg  Dose: 2 mg  Freq: Every 6 Hours Route: PO  Start: 06/15/23 0800   End: 06/15/23 0709   Admin Instructions:      Caution: Look alike/sound alike drug alert         Followed by  LORazepam (ATIVAN) tablet 1 mg  Dose: 1 mg  Freq: Every 6 Hours Route: PO  Start:  "06/16/23 0800   End: 06/15/23 0709   Admin Instructions:      Caution: Look alike/sound alike drug alert          oxyCODONE (ROXICODONE) immediate release tablet 5 mg  Dose: 5 mg  Freq: Every 8 Hours PRN Route: PO  PRN Reason: Moderate Pain  Start: 06/15/23 1046   End: 06/15/23 1048   Admin Instructions:   Based on patient request - if ordered for moderate or severe pain, provider allows for administration of a medication prescribed for a lower pain scale.        If given for pain, use the following pain scale:  Mild Pain = Pain Score of 1-3, CPOT 1-2  Moderate Pain = Pain Score of 4-6, CPOT 3-4  Severe Pain = Pain Score of 7-10, CPOT 5-8          pantoprazole (PROTONIX) EC tablet 40 mg  Dose: 40 mg  Freq: Every Early Morning Route: PO  Start: 06/15/23 0600   End: 06/15/23 1116   Admin Instructions:   Swallow whole; do not crush, split, or chew.      0630-Given             Phosphorus Replacement - Follow Nurse / BPA Driven Protocol  Freq: As Needed Route: XX  PRN Reason: Other  Start: 06/14/23 1801   End: 06/15/23 0914   Admin Instructions:   Open Order & Select \"[facility] Electrolyte Replacement Protocol Algorithm\" to View Details               Lab Results (all)       Procedure Component Value Units Date/Time    Protime-INR [[id number]]  (Normal) Collected: 06/15/23 0744    Specimen: Blood Updated: 06/15/23 0901     Protime 13.8 Seconds      INR 1.04    Phosphorus [[id number]]  (Abnormal) Collected: 06/15/23 0538    Specimen: Blood Updated: 06/15/23 0738     Phosphorus 2.1 mg/dL     Magnesium [[id number]]  (Normal) Collected: 06/15/23 0538    Specimen: Blood Updated: 06/15/23 0738     Magnesium 2.3 mg/dL     Hepatic Function Panel [[id number]]  (Abnormal) Collected: 06/15/23 0538    Specimen: Blood Updated: 06/15/23 0702     Total Protein 6.5 g/dL      Albumin 4.0 g/dL      ALT (SGPT) 343 U/L      AST (SGOT) 422 U/L      Comment: Specimen hemolyzed.  Results may be affected.        Alkaline Phosphatase 238 U/L      Total " Bilirubin 9.0 mg/dL      Bilirubin, Direct 5.7 mg/dL      Comment: Specimen hemolyzed. Results may be affected.        Bilirubin, Indirect 3.3 mg/dL     Basic Metabolic Panel [[id number]]  (Abnormal) Collected: 06/15/23 0538    Specimen: Blood Updated: 06/15/23 0701     Glucose 98 mg/dL      BUN 8 mg/dL      Creatinine 0.98 mg/dL      Sodium 133 mmol/L      Potassium 3.7 mmol/L      Comment: Slight hemolysis detected by analyzer. Results may be affected.        Chloride 95 mmol/L      CO2 29.0 mmol/L      Calcium 8.6 mg/dL      BUN/Creatinine Ratio 8.2     Anion Gap 9.0 mmol/L      eGFR 101.9 mL/min/1.73     Narrative:      GFR Normal >60  Chronic Kidney Disease <60  Kidney Failure <15      CBC (No Diff) [[id number]]  (Abnormal) Collected: 06/15/23 0538    Specimen: Blood Updated: 06/15/23 0619     WBC 5.74 10*3/mm3      RBC 4.41 10*6/mm3      Hemoglobin 13.9 g/dL      Hematocrit 39.1 %      MCV 88.7 fL      MCH 31.5 pg      MCHC 35.5 g/dL      RDW 13.9 %      RDW-SD 44.4 fl      MPV 10.0 fL      Platelets 119 10*3/mm3     Urinalysis, Microscopic Only - Urine, Clean Catch [[id number]] Collected: 06/14/23 1054    Specimen: Urine, Clean Catch Updated: 06/14/23 1622     RBC, UA 0-2 /HPF      WBC, UA 0-2 /HPF      Bacteria, UA None Seen /HPF      Squamous Epithelial Cells, UA 0-2 /HPF      Hyaline Casts, UA 0-2 /LPF      Methodology Manual Light Microscopy    Gamma GT [[id number]]  (Abnormal) Collected: 06/14/23 0955    Specimen: Blood Updated: 06/14/23 1517     GGT 1,926 U/L     Hepatitis Panel, Acute [[id number]]  (Normal) Collected: 06/14/23 0955    Specimen: Blood Updated: 06/14/23 1511     Hepatitis B Surface Ag Non-Reactive     Hep A IgM Non-Reactive     Hep B C IgM Non-Reactive     Hepatitis C Ab Non-Reactive    Narrative:      Results may be falsely decreased if patient taking Biotin.     Urine Drug Screen - Urine, Clean Catch [[id number]]  (Abnormal) Collected: 06/14/23 1054    Specimen: Urine, Clean Catch Updated:  06/14/23 1132     THC, Screen, Urine Negative     Phencyclidine (PCP), Urine Negative     Cocaine Screen, Urine Negative     Methamphetamine, Ur Negative     Opiate Screen Negative     Amphetamine Screen, Urine Positive     Benzodiazepine Screen, Urine Negative     Tricyclic Antidepressants Screen Positive     Methadone Screen, Urine Negative     Barbiturates Screen, Urine Negative     Oxycodone Screen, Urine Negative     Propoxyphene Screen Negative     Buprenorphine, Screen, Urine Negative    Narrative:      Cutoff For Drugs Screened:    Amphetamines               500 ng/ml  Barbiturates               200 ng/ml  Benzodiazepines            150 ng/ml  Cocaine                    150 ng/ml  Methadone                  200 ng/ml  Opiates                    100 ng/ml  Phencyclidine               25 ng/ml  THC                            50 ng/ml  Methamphetamine            500 ng/ml  Tricyclic Antidepressants  300 ng/ml  Oxycodone                  100 ng/ml  Propoxyphene               300 ng/ml  Buprenorphine               10 ng/ml    The normal value for all drugs tested is negative. This report includes unconfirmed screening results, with the cutoff values listed, to be used for medical treatment purposes only.  Unconfirmed results must not be used for non-medical purposes such as employment or legal testing.  Clinical consideration should be applied to any drug of abuse test, particularly when unconfirmed results are used.      Urinalysis With Microscopic If Indicated (No Culture) - Urine, Clean Catch [[id number]]  (Abnormal) Collected: 06/14/23 1054    Specimen: Urine, Clean Catch Updated: 06/14/23 1124     Color, UA Wanda     Appearance, UA Clear     pH, UA 6.0     Specific Gravity, UA 1.020     Glucose,  mg/dL (Trace)     Ketones, UA Negative     Bilirubin, UA Moderate (2+)     Blood, UA Small (1+)     Protein, UA Trace     Leuk Esterase, UA Negative     Nitrite, UA Negative     Urobilinogen, UA 2.0 E.U./dL     Ethanol [[id number]] Collected: 06/14/23 0955    Specimen: Blood Updated: 06/14/23 1026     Ethanol <10 mg/dL      Ethanol % <0.010 %     Lipase [[id number]]  (Abnormal) Collected: 06/14/23 0955    Specimen: Blood Updated: 06/14/23 1026     Lipase 78 U/L     Acetaminophen Level [[id number]]  (Normal) Collected: 06/14/23 0955    Specimen: Blood Updated: 06/14/23 1026     Acetaminophen <5.0 mcg/mL     Comprehensive Metabolic Panel [[id number]]  (Abnormal) Collected: 06/14/23 0955    Specimen: Blood Updated: 06/14/23 1026     Glucose 148 mg/dL      BUN 19 mg/dL      Creatinine 0.77 mg/dL      Sodium 126 mmol/L      Potassium 3.4 mmol/L      Comment: Slight hemolysis detected by analyzer. Results may be affected.        Chloride 85 mmol/L      CO2 28.7 mmol/L      Calcium 9.9 mg/dL      Total Protein 7.3 g/dL      Albumin 4.3 g/dL      ALT (SGPT) 451 U/L      AST (SGOT) 566 U/L      Alkaline Phosphatase 300 U/L      Total Bilirubin 12.6 mg/dL      Globulin 3.0 gm/dL      A/G Ratio 1.4 g/dL      BUN/Creatinine Ratio 24.7     Anion Gap 12.3 mmol/L      eGFR 118.3 mL/min/1.73     Narrative:      GFR Normal >60  Chronic Kidney Disease <60  Kidney Failure <15      Lactic Acid, Plasma [[id number]]  (Normal) Collected: 06/14/23 0955    Specimen: Blood Updated: 06/14/23 1025     Lactate 1.4 mmol/L     CBC & Differential [[id number]]  (Abnormal) Collected: 06/14/23 0955    Specimen: Blood Updated: 06/14/23 1016    Narrative:      The following orders were created for panel order CBC & Differential.  Procedure                               Abnormality         Status                     ---------                               -----------         ------                     CBC Auto Differential[[id number]]        Abnormal            Final result                 Please view results for these tests on the individual orders.    CBC Auto Differential [[id number]]  (Abnormal) Collected: 06/14/23 0955    Specimen: Blood Updated: 06/14/23 1016      WBC 6.81 10*3/mm3      RBC 5.05 10*6/mm3      Hemoglobin 15.8 g/dL      Hematocrit 44.3 %      MCV 87.7 fL      MCH 31.3 pg      MCHC 35.7 g/dL      RDW 13.2 %      RDW-SD 43.1 fl      MPV 11.0 fL      Platelets 144 10*3/mm3      Neutrophil % 75.0 %      Lymphocyte % 17.5 %      Monocyte % 7.0 %      Eosinophil % 0.0 %      Basophil % 0.1 %      Immature Grans % 0.4 %      Neutrophils, Absolute 5.10 10*3/mm3      Lymphocytes, Absolute 1.19 10*3/mm3      Monocytes, Absolute 0.48 10*3/mm3      Eosinophils, Absolute 0.00 10*3/mm3      Basophils, Absolute 0.01 10*3/mm3      Immature Grans, Absolute 0.03 10*3/mm3           Imaging Results (All)       Procedure Component Value Units Date/Time    CT Head Without Contrast [[id number]] Collected: 06/14/23 1104     Updated: 06/15/23 1044    Narrative:      CT OF THE BRAIN WITHOUT CONTRAST 06/14/2023     HISTORY: Mental status change. Dizziness.     Axial images were obtained through the brain without intravenous  contrast.     The brain parenchyma and ventricular system appear within normal limits.  No mass lesions, midline shift, intracranial hemorrhage or evidence of  infarction is demonstrated.       Impression:      No acute process identified.           Radiation dose reduction techniques were utilized, including automated  exposure control and exposure modulation based on body size.     This report was finalized on 6/15/2023 10:41 AM by Dr. Issac Rodriguez M.D.       CT Abdomen Pelvis With Contrast [[id number]] Collected: 06/14/23 1054     Updated: 06/14/23 1100    Narrative:      EXAMINATION: CT OF THE ABDOMEN AND PELVIS WITH CONTRAST     TECHNIQUE: Computed tomography of the abdomen and pelvis after the  uneventful administration of nonionic intravenous contrast per protocol.  Radiation dose reduction techniques were utilized, including automated  exposure control and exposure modulation based on body size.      HISTORY: Nausea, vomiting meeting, and jaundice      COMPARISON: 04/25/2022     FINDINGS: Limited evaluation of the inferior thorax demonstrates no  consolidation, pleural effusion, or pneumothorax. The heart is normal in  size and configuration, without pericardial effusion.     There is hepatic steatosis with mild focal sparing near the gallbladder  fossa. There is interval near resolution of peripancreatic stranding  with mild residual pancreatic thickening along the body and with  heterogeneous appearance of the pancreatic parenchyma. The gallbladder  is distended. No biliary ductal dilation is seen. There is mild gastric  distention. The urinary bladder is distended.     The spleen, adrenal glands, kidneys, small bowel, large bowel, and  abdominal vasculature are normal. No intraperitoneal fluid collection or  free gas are seen. No enlarged lymph nodes are demonstrated.     Bone windows demonstrate degenerative changes, without suspicious  osseous lesion seen.       Impression:      1. Improved findings of pancreatitis, with possible residual or  recurrent pancreatitis demonstrated  2. Hepatic steatosis  3. Additional incidental findings as above.     This report was finalized on 6/14/2023 10:57 AM by Dr. Giacomo Jain M.D.       XR Chest 1 View [[id number]] Collected: 06/14/23 1042     Updated: 06/14/23 1046    Narrative:      PORTABLE CHEST X-RAY     HISTORY: Chest pain with dizziness, jaundice, and multiple episodes of  vomiting overnight..     Portable chest x-ray is provided. Correlation: Chest x-ray 08/27/2014.     FINDINGS: The cardiomediastinal silhouette is normal. The lungs are  clear. The costophrenic sulci are dry and the bones appear normal. There  is no pneumothorax. No pneumomediastinum.       Impression:      Negative.     This report was finalized on 6/14/2023 10:42 AM by Dr. Jonatan Wheat M.D.             ECG/EMG Results (all)       Procedure Component Value Units Date/Time    ECG 12 Lead Other; dizzy [[id number]] Collected: 06/14/23 0909      Updated: 06/14/23 1211     QT Interval 401 ms     Narrative:      HEART RATE= 83  bpm  RR Interval= 723  ms  HI Interval= 122  ms  P Horizontal Axis= 0  deg  P Front Axis= 38  deg  QRSD Interval= 99  ms  QT Interval= 401  ms  QRS Axis= 67  deg  T Wave Axis= 56  deg  - NORMAL ECG -  Sinus rhythm  Consider left ventricular hypertrophy  When compared with ECG of 29-Apr-2022 20:01:38,  No significant change  Electronically Signed By: [facility] 14-Jun-2023 12:11:15  Date and Time of Study: 2023-06-14 09:53:15    SCANNED - TELEMETRY   [[id number]] Resulted: 06/14/23     Updated: 06/15/23 0737    SCANNED EKG [[id number]] Resulted: 06/14/23     Updated: 06/15/23 1030          Orders (last 48 hrs)        Start     Ordered    06/20/23 0900  thiamine (VITAMIN B-1) tablet 100 mg  Daily        See Hyperspace for full Linked Orders Report.    06/14/23 1800    06/16/23 0800  LORazepam (ATIVAN) tablet 1 mg  Every 6 Hours,   Status:  Discontinued        See Hyperspace for full Linked Orders Report.    06/15/23 0708    06/15/23 1730  pantoprazole (PROTONIX) EC tablet 40 mg  2 Times Daily Before Meals         06/15/23 1116    06/15/23 1145  chlordiazePOXIDE (LIBRIUM) capsule 25 mg  3 Times Daily         06/15/23 1056    06/15/23 1053  Inpatient Gastroenterology Consult  Once        Specialty:  Gastroenterology  Provider:  Jonatan Hall MD    06/15/23 1053    06/15/23 1048  oxyCODONE (ROXICODONE) immediate release tablet 5 mg  Every 6 Hours PRN         06/15/23 1048    06/15/23 1046  oxyCODONE (ROXICODONE) immediate release tablet 5 mg  Every 8 Hours PRN,   Status:  Discontinued         06/15/23 1046    06/15/23 1015  potassium & sodium phosphates (PHOS-NAK) oral packet  Once         06/15/23 0917    06/15/23 0909  Phosphorus Replacement - Follow Nurse / BPA Driven Protocol  As Needed         06/15/23 0909    06/15/23 0900  venlafaxine XR (EFFEXOR-XR) 24 hr capsule 225 mg  Daily         06/14/23 1959    06/15/23 0800  LORazepam  (ATIVAN) tablet 2 mg  Every 6 Hours,   Status:  Discontinued        See Hyperspace for full Linked Orders Report.    06/15/23 0708    06/15/23 0725  Inpatient Neurology Consult General  Once        Specialty:  Neurology  Provider:  Jonatan Guerrero MD    06/15/23 0724    06/15/23 0724  Diet: Cardiac Diets; Healthy Heart (2-3 Na+); Texture: Regular Texture (IDDSI 7); Fluid Consistency: Thin (IDDSI 0)  Diet Effective Now         06/15/23 0723    06/15/23 0710  Magnesium  Once         06/15/23 0709    06/15/23 0708  Initiate Alcohol Withdrawal Protocol  Once         06/15/23 0708    06/15/23 0708  Vital Signs  Per Hospital Policy         06/15/23 0708    06/15/23 0708  Continuous Pulse Oximetry  Continuous         06/15/23 0708    06/15/23 0708  Obtain Baseline Clinical [place] Withdrawal Assessment - Ar (CIWA-Ar), Sedation Scale & Vital Signs  Once        Comments: Follow CIWA Scoring Reference Guide    06/15/23 0708    06/15/23 0708  Clinical [place] Withdrawal Assessment (CIWA-Ar)  Per Hospital Policy         06/15/23 0708    06/15/23 0708  If CIWA-Ar Score Less Than 8 For 3 Consecutive Assessments, Monitor Every 4 Hours & Discontinue Assessment When CIWA-Ar Less Than 8 for 24 Hours  Per Hospital Policy        Comments: Contact Provider to Restart Protocol if Any Symptoms Reappear    06/15/23 0708    06/15/23 0708  Seizure Precautions  Continuous         06/15/23 0708    06/15/23 0708  Notify Provider - Withdrawal  Until Discontinued         06/15/23 0708    06/15/23 0708  Notify Provider of Abnormal Lab Results  Until Discontinued         06/15/23 0708    06/15/23 0708  Notify Provider - Vitals  Until Discontinued         06/15/23 0708    06/15/23 0708  Safety Precautions  Continuous         06/15/23 0708    06/15/23 0707  LORazepam (ATIVAN) tablet 1 mg  Every 1 Hour PRN,   Status:  Discontinued        See Hyperspace for full Linked Orders Report.    06/15/23 0708    06/15/23 0707  LORazepam (ATIVAN) injection 1  mg  Every 1 Hour PRN,   Status:  Discontinued        See Hyperspace for full Linked Orders Report.    06/15/23 0708    06/15/23 0707  LORazepam (ATIVAN) tablet 2 mg  Every 1 Hour PRN,   Status:  Discontinued        See Hyperspace for full Linked Orders Report.    06/15/23 0708    06/15/23 0707  LORazepam (ATIVAN) injection 2 mg  Every 1 Hour PRN,   Status:  Discontinued        See Hyperspace for full Linked Orders Report.    06/15/23 0708    06/15/23 0707  LORazepam (ATIVAN) injection 2 mg  Every 15 Minutes PRN,   Status:  Discontinued        See Hyperspace for full Linked Orders Report.    06/15/23 0708    06/15/23 0707  LORazepam (ATIVAN) injection 2 mg  Every 15 Minutes PRN,   Status:  Discontinued        See Hyperspace for full Linked Orders Report.    06/15/23 0708    06/15/23 0707  Phosphorus  Once         06/15/23 0706    06/15/23 0707  Protime-INR  Once         06/15/23 0706    06/15/23 0600  Basic Metabolic Panel  Morning Draw         06/14/23 1759    06/15/23 0600  CBC (No Diff)  Morning Draw         06/14/23 1759    06/15/23 0600  pantoprazole (PROTONIX) EC tablet 40 mg  Every Early Morning,   Status:  Discontinued         06/14/23 1959    06/15/23 0600  Hepatic Function Panel  Morning Draw         06/14/23 2044    06/14/23 2244  hydrOXYzine (ATARAX) tablet 25 mg  3 Times Daily PRN         06/14/23 2244    06/14/23 2200  thiamine (B-1) injection 200 mg  Every 8 Hours Scheduled        See Hyperspace for full Linked Orders Report.    06/14/23 1800    06/14/23 2100  sennosides-docusate (PERICOLACE) 8.6-50 MG per tablet 2 tablet  2 Times Daily        See Hyperspace for full Linked Orders Report.    06/14/23 1759 06/14/23 2100  busPIRone (BUSPAR) tablet 10 mg  3 Times Daily         06/14/23 1959 06/14/23 2100  mirtazapine (REMERON) tablet 15 mg  Nightly         06/14/23 1959 06/14/23 2100  propranolol (INDERAL) tablet 20 mg  3 Times Daily         06/14/23 1959 06/14/23 2000  Vital Signs  Every 4  Hours      Comments: Per per hospital policy    06/14/23 1759 06/14/23 1957  Diet: Liquid Diets; Clear Liquid; Texture: Regular Texture (IDDSI 7); Fluid Consistency: Thin (IDDSI 0)  Diet Effective Now,   Status:  Canceled         06/14/23 1957 06/14/23 1956  morphine injection 2 mg  Every 4 Hours PRN         06/14/23 1957 06/14/23 1956  metoclopramide (REGLAN) injection 10 mg  Every 6 Hours PRN         06/14/23 1957 06/14/23 1900  folic acid (FOLVITE) tablet 1 mg  Daily         06/14/23 1800    06/14/23 1900  sodium chloride 0.9 % with KCl 20 mEq/L infusion  Continuous         06/14/23 1801 06/14/23 1802  PT Consult: Eval & Treat Functional Mobility Below Baseline  Once        Comments: Reason Why PT Needed: dizziness    06/14/23 1801    06/14/23 1802  OT Consult: Eval & Treat  Once        Comments: Reason Why ot Needed: dizziness    06/14/23 1801    06/14/23 1801  Potassium Replacement - Follow Nurse / BPA Driven Protocol  As Needed         06/14/23 1801    06/14/23 1801  Magnesium Standard Dose Replacement - Follow Nurse / BPA Driven Protocol  As Needed         06/14/23 1801    06/14/23 1801  Phosphorus Replacement - Follow Nurse / BPA Driven Protocol  As Needed,   Status:  Discontinued         06/14/23 1801 06/14/23 1801  Calcium Replacement - Follow Nurse / BPA Driven Protocol  As Needed         06/14/23 1801    06/14/23 1800  Code Status and Medical Interventions:  Continuous         06/14/23 1759 06/14/23 1800  Diet: Cardiac Diets; Healthy Heart (2-3 Na+); Texture: Regular Texture (IDDSI 7); Fluid Consistency: Thin (IDDSI 0)  Diet Effective Now,   Status:  Canceled         06/14/23 1759 06/14/23 1800  Daily Weights  Daily       06/14/23 1759 06/14/23 1800  Strict Intake & Output  Every Shift       06/14/23 1759 06/14/23 1800  Oral Care  2 Times Daily       06/14/23 1759    06/14/23 1800  Place Sequential Compression Device  Once         06/14/23 1759 06/14/23 1800  Maintain  Sequential Compression Device  Continuous         06/14/23 1759    06/14/23 1800  Initiate Alcohol Withdrawal Protocol  Once         06/14/23 1800    06/14/23 1800  Vital Signs  Per Hospital Policy         06/14/23 1800    06/14/23 1800  Continuous Pulse Oximetry  Continuous,   Status:  Canceled         06/14/23 1800    06/14/23 1800  Obtain baseline Clinical [place] Withdrawal Assessment - Ar, sedation scale, and vital signs  Until Discontinued        Comments: See hyperlink for CIWA scoring reference guide.    06/14/23 1800    06/14/23 1800  Clinical [place] Withdrawal Assessment (CIWA-Ar)  Per Hospital Policy         06/14/23 1800    06/14/23 1800  If CIWA-Ar Score Less Than 8 For 3 Consecutive Assessments, Monitor Every 4 Hours & Discontinue Assessment When CIWA-Ar Less Than 8 for 24 Hours  Per Hospital Policy        Comments: Contact Provider to Restart Protocol if Any Symptoms Reappear    06/14/23 1800    06/14/23 1800  Obtain pre and post sedation scores with every Lorazepam dose  Once        Comments: Hold Lorazepam for POSS greater than 2 or RASS of-3, -4, -5.     06/14/23 1800    06/14/23 1800  Seizure Precautions  Continuous         06/14/23 1800    06/14/23 1800  Notify physician for breakthrough symptoms of withdrawal and to restart protocol  Until Discontinued        Comments: Including: anxiety, agitation, severe vomiting, seizure activity.    06/14/23 1800    06/14/23 1800  Notify physician of abnormal lab results  Until Discontinued         06/14/23 1800    06/14/23 1800  Notify physician  Until Discontinued         06/14/23 1800    06/14/23 1800  Fall Precautions  Continuous         06/14/23 1800    06/14/23 1800  Safety Precautions  Continuous         06/14/23 1800    06/14/23 1800  Inpatient consult to [facility]  Once        Provider:  (Not yet assigned)    06/14/23 1800    06/14/23 1759  cloNIDine (CATAPRES) tablet 0.1 mg  Every 4 Hours PRN         06/14/23 1800    06/14/23 1759   Magnesium Standard Dose Replacement - Follow Nurse / BPA Driven Protocol  As Needed         06/14/23 1800    06/14/23 1759  acetaminophen (TYLENOL) tablet 650 mg  Every 4 Hours PRN,   Status:  Discontinued         06/14/23 1759 06/14/23 1759  polyethylene glycol (MIRALAX) packet 17 g  Daily PRN        See Hyperspace for full Linked Orders Report.    06/14/23 1759 06/14/23 1759  bisacodyl (DULCOLAX) EC tablet 5 mg  Daily PRN        See Hyperspace for full Linked Orders Report.    06/14/23 1759 06/14/23 1759  bisacodyl (DULCOLAX) suppository 10 mg  Daily PRN        See Hyperspace for full Linked Orders Report.    06/14/23 1759 06/14/23 1759  ondansetron (ZOFRAN) tablet 4 mg  Every 6 Hours PRN        See Hyperspace for full Linked Orders Report.    06/14/23 1759 06/14/23 1759  ondansetron (ZOFRAN) injection 4 mg  Every 6 Hours PRN        See Hyperspace for full Linked Orders Report.    06/14/23 1759 06/14/23 1759  melatonin tablet 3 mg  Nightly PRN         06/14/23 1759    06/14/23 1330  metoclopramide (REGLAN) injection 10 mg  Once         06/14/23 1310    06/14/23 1245  sodium chloride 0.9 % infusion 1,000 mL  Once         06/14/23 1220    06/14/23 1245  folic acid 1 mg in sodium chloride 0.9 % 50 mL IVPB  Once         06/14/23 1220    06/14/23 1245  thiamine (B-1) 200 mg in sodium chloride 0.9 % 100 mL IVPB  Once         06/14/23 1220    06/14/23 1201  Inpatient Admission  Once         06/14/23 1200    06/14/23 1201  Cardiac Monitoring  Continuous        Comments: Follow Standing Orders As Outlined in Process Instructions (Open Order Report to View Full Instructions)    06/14/23 1200    06/14/23 1124  Urinalysis, Microscopic Only - Urine, Clean Catch  Once         06/14/23 1123    06/14/23 1115  morphine injection 4 mg  Once         06/14/23 1045    06/14/23 1115  ondansetron (ZOFRAN) injection 4 mg  Once         06/14/23 1045    06/14/23 1115  iopamidol (ISOVUE-370) 76 % injection 100 mL  Once in  Imaging         06/14/23 1046    06/14/23 1000  sodium chloride 0.9 % bolus 1,000 mL  Once         06/14/23 0932    06/14/23 1000  LORazepam (ATIVAN) injection 2 mg  Once,   Status:  Discontinued         06/14/23 0935    06/14/23 0933  ED Acknowledgement Form Needed;  Once        Comments: Obtain ED acknowledgement form once pt stabilized and has been seen by Provider    06/14/23 0932    06/14/23 0932  CT Abdomen Pelvis With Contrast  1 Time Imaging         06/14/23 0932    06/14/23 0932  XR Chest 1 View  1 Time Imaging         06/14/23 0932    06/14/23 0931  ECG 12 Lead Other; dizzy  Once         06/14/23 0932    06/14/23 0931  CT Head Without Contrast  1 Time Imaging         06/14/23 0932    06/14/23 0930  Hepatitis Panel, Acute  Once         06/14/23 0932    06/14/23 0930  Gamma GT  Once         06/14/23 0932    06/14/23 0929  Urinalysis With Microscopic If Indicated (No Culture) - Urine, Clean Catch  Once         06/14/23 0932    06/14/23 0929  Urine Drug Screen - Urine, Clean Catch  Once         06/14/23 0932    06/14/23 0929  Ethanol  Once         06/14/23 0932    06/14/23 0929  Acetaminophen Level  Once         06/14/23 0932    06/14/23 0929  CBC & Differential  Once         06/14/23 0932    06/14/23 0929  Comprehensive Metabolic Panel  Once         06/14/23 0932    06/14/23 0929  Lactic Acid, Plasma  Once         06/14/23 0932    06/14/23 0929  Lipase  Once         06/14/23 0932    06/14/23 0929  Ammonia  STAT,   Status:  Canceled         06/14/23 0932    06/14/23 0929  CBC Auto Differential  PROCEDURE ONCE         06/14/23 0932    05/18/23 0000  busPIRone (BUSPAR) 10 MG tablet  3 Times Daily         06/14/23 1124    05/16/23 0000  hydrOXYzine pamoate (VISTARIL) 50 MG capsule  4 Times Daily PRN         06/14/23 1124    05/16/23 0000  methocarbamol (ROBAXIN) 750 MG tablet  3 Times Daily         06/14/23 1124    05/11/23 0000  omeprazole (priLOSEC) 20 MG capsule  Daily         06/14/23 1124    05/07/23 0000   cloNIDine (CATAPRES) 0.1 MG tablet  2 Times Daily PRN         23 1122    23 0000  fluticasone (FLONASE) 50 MCG/ACT nasal spray  Daily         23 1124    Unscheduled  Up with assistance  As Needed       23 1759    Unscheduled  Obtain Pre & Post Sedation Scores With Every Lorazepam Dose - Hold For POSS Greater Than 2 or RASS of -3 or Less  As Needed       06/15/23 0708    --  SCANNED - TELEMETRY           23 0000    --  SCANNED EKG         23 0000                    Physician Progress Notes (last 48 hours)        Alfredo Torres MD at 06/15/23 1047              Name: Mane Gunderson ADMIT: 2023   : 1985  PCP: Dominique Ontiveros APRN    MRN: [id number] LOS: 1 days   AGE/SEX: 38 y.o. male  ROOM: [facility]     Subjective   Subjective   Laying in bed.  Complains of abdominal pain, and dizziness movement, especially moving his head. Abdominal pain unchanged, denies nausea or vomiting.    Review of Systems  As above  Objective   Objective   Vital Signs  Temp:  [97.6 °F (36.4 °C)-98.9 °F (37.2 °C)] 97.6 °F (36.4 °C)  Heart Rate:  [] 90  Resp:  [16-18] 16  BP: (139-159)/() 159/108  SpO2:  [95 %-99 %] 97 %  on   ;   Device (Oxygen Therapy): room air  Body mass index is 27.62 kg/m².  Physical Exam    General: Sitting up in the bed, not in distress, ill-appearing,  HEENT: Normocephalic, atraumatic  CV: Ctachcyardiac no murmurs  Lungs: Clear to auscultation bilaterally, no crackles or wheezes  Abdomen: Soft, mildly distended, tender to palpation,  Extremities: No significant peripheral edema , no cyanosis     Results Review     I reviewed the patient's new clinical results.  Results from last 7 days   Lab Units 06/15/23  0538 23  0955   WBC 10*3/mm3 5.74 6.81   HEMOGLOBIN g/dL 13.9 15.8   PLATELETS 10*3/mm3 119* 144     Results from last 7 days   Lab Units 06/15/23  0538 23  0955   SODIUM mmol/L 133* 126*   POTASSIUM mmol/L 3.7 3.4*   CHLORIDE mmol/L 95* 85*    CO2 mmol/L 29.0 28.7   BUN mg/dL 8 19   CREATININE mg/dL 0.98 0.77   GLUCOSE mg/dL 98 148*   Estimated Creatinine Clearance: 123.8 mL/min (by C-G formula based on SCr of 0.98 mg/dL).  Results from last 7 days   Lab Units 06/15/23  0538 06/14/23  0955   ALBUMIN g/dL 4.0 4.3   BILIRUBIN mg/dL 9.0* 12.6*   ALK PHOS U/L 238* 300*   AST (SGOT) U/L 422* 566*   ALT (SGPT) U/L 343* 451*     Results from last 7 days   Lab Units 06/15/23  0538 06/14/23  0955   CALCIUM mg/dL 8.6 9.9   ALBUMIN g/dL 4.0 4.3   MAGNESIUM mg/dL 2.3  --    PHOSPHORUS mg/dL 2.1*  --      Results from last 7 days   Lab Units 06/14/23  0955   LACTATE mmol/L 1.4     COVID19   Date Value Ref Range Status   04/25/2022 Not Detected Not Detected - Ref. Range Final   09/10/2021 Not Detected Not Detected - Ref. Range Final     No results found for: HGBA1C, POCGLU        CT Head Without Contrast  Narrative: CT OF THE BRAIN WITHOUT CONTRAST 06/14/2023     HISTORY: Mental status change. Dizziness.     Axial images were obtained through the brain without intravenous  contrast.     The brain parenchyma and ventricular system appear within normal limits.  No mass lesions, midline shift, intracranial hemorrhage or evidence of  infarction is demonstrated.     Impression: No acute process identified.           Radiation dose reduction techniques were utilized, including automated  exposure control and exposure modulation based on body size.     This report was finalized on 6/15/2023 10:41 AM by Dr. Issac Rodriguez M.D.       Scheduled Medications  busPIRone, 10 mg, Oral, TID  chlordiazePOXIDE, 25 mg, Oral, TID  folic acid, 1 mg, Oral, Daily  mirtazapine, 15 mg, Oral, Nightly  pantoprazole, 40 mg, Oral, BID AC  propranolol, 20 mg, Oral, TID  senna-docusate sodium, 2 tablet, Oral, BID  thiamine (B-1) IV, 200 mg, Intravenous, Q8H   Followed by  [START ON 6/20/2023] thiamine, 100 mg, Oral, Daily  venlafaxine XR, 225 mg, Oral, Daily    Infusions  sodium chloride 0.9 %  with KCl 20 mEq, 100 mL/hr, Last Rate: 100 mL/hr (06/15/23 0500)    Diet  Diet: Cardiac Diets; Healthy Heart (2-3 Na+); Texture: Regular Texture (IDDSI 7); Fluid Consistency: Thin (IDDSI 0)    I have personally reviewed     [x]  Laboratory   []  Microbiology   [x]  Radiology   [x]  EKG/Telemetry  [x]  Cardiology/Vascular   []  Pathology    [x]  Records      Assessment/Plan     Active Hospital Problems    Diagnosis  POA    **Alcoholic hepatitis, unspecified whether ascites present [K70.10]  Yes    Anxiety [F41.9]  Yes    Bipolar I disorder [F31.9]  Yes      Resolved Hospital Problems   No resolved problems to display.     Mr. Gunderson is a 36 y.o. with a history of alcohol dependence, pancreatitis presents today with complaints of dizziness, abdominal pain , vomiting, diarrhea, and cough.      Alcohol use disorder/alcoholic hepatitis:  -CT abdomen and pelvis 06/14-1. Improved findings of pancreatitis, with possible residual orrecurrent pancreatitis demonstrated2. Hepatic steatosis  3. Additional incidental findings as above.  -Continue IV fluids, antiemetics, pain management  -LFTs improving  -Access following  -Avoid Tylenol  To daily BMP  -History of allergies to Ativan, initiated on chlordiazepoxide, Monitor CIWA  GI consult for alcoholic hepatitis      Dizziness.  Associated with movement, concern for vertigo, neurology consulted.        SCDs for DVT prophylaxis.  Full code.  Discussed with patient.  Discussed with RN  Anticipate discharge  timing yet to be determined.      Copied text in this note has been reviewed and is accurate as of 06/15/23.         Dictated utilizing Dragon dictation        Alfredo Torres MD  [facility]  06/15/23  11:23 EDT        Electronically signed by Alfredo Torres MD at 06/15/23 1124       Consult Notes (last 48 hours)  Notes from 06/13/23 1235 through 06/15/23 1235   No notes of this type exist for this encounter.

## 2023-06-15 NOTE — PLAN OF CARE
Goal Outcome Evaluation:  Plan of Care Reviewed With: patient           Outcome Evaluation: Pt seen for OT eval after admission with alcoholic hepatits and acute dizziness. Pt independent at baseline and able to mobilize in room with supervision for safety and without episode of dizziness with turning or transfers in bathroom. Pt reports dizziness and tunnel vision occur with sudden, moura movements of turning head laterally or looking up. Discussed with PT who may consider Epley Maneuver after neurology sees pt later today. Pt has no further acute OT needs at this time and OT will signoff now.

## 2023-06-15 NOTE — CONSULTS
"REASON FOR ACCESS CONSULT:     ETOH    Pt seen by [facility] for same 04/2022 for same - please see note. His ETOH screen upon admission yesterday was negative.    Pt presents quite differently today than at that time last year. Today, he was found fidgeting in bed. Introduced self and explained role, and patient immediately stated, \"Can I go ahead and skip this.... I've been up for three days.. things are kind of loud... I spent 45 days at [facility]... transferred to [facility] and on day 2 they yanked me out... 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 I'm a sociopath.. I couldn't tolerate listening and my hearing fluctuates....they gave me the boot and a rental car and I relapsed... I was comforting people that were crying there and while I was doing that I was watching my heart rate on a watch and it would go up and down.. and I'd rather talk about science than these questions you're asking me..\"    He stated year was \"2023 or 4\", he cannot name day of week or date and stated, \"I don't keep track of time\" and went on a nonsensical rant. He then spoke of \"3-dimensional questions\". He was able to name location, city and [place]. Pt needed frequent redirecting in conversation. He is restless. Speech is pressured and rambling. Attention span poor. Circumstantial, disorganized, tangential thought process, ideas of reference, lacking content. Seems to have some delusional thinking. Mood and affect irritable/expansive. He did not quantify feelings of anxiety or depression on a 0-10 scale, but stated, \"I'm highly taxed\". He denied SI/HI. He stated he has not slept \"in three days\". Appetite poor. He denied hallucinations.    Most recent CIWA=3 over the 0700 hour. Scores have ranged from 3-4 thus far since admission. Attempted to ask pt regarding cravings and feelings of withdrawal; however, pt replied, \"I'm not going to answer questions about that and I don't want to talk about this anymore\".     Clinician " ended interview.    MENTAL HEALTH HX:    Per chart review, parents took pt intoxicated with  to [facility] 3/26/23, where he made suicidal comments and apparently dove out of a wheelchair purposely.     Per chart review, pt has hx of anxiety, ADHD, bipolar I, and PTSD. Apparently had autistic tendencies as a child. He was following with a psychiatrist NP last year, but unknown if he is still following with them currently. Home prescriptions and compliance unknown - hx of taking Adderrall, Remeron, Inderal, Effexor, and Vistaril. Takes Deplin supplement due to serotonin and dopamine deficiency. Psych admissions and suicide attempts /self-harm unknown, but at last assessment denied.     SUBSTANCE USE HX:    Per chart review note of 3/26/23, parents stated pt was living with them and he drinks ETOH heavily on a daily basis. Pt will want to be admitted to rehab; however, will change his mind or leave the facility.    Today, pt will not give writer any info on his ETOH habits. Per chart review, pt has been drinking a pint of liquor daily. Age of first use 22. He has been to treatment facilities and has attended AA in the past. He had denied blackouts and denied legal consequences last year.     SOCIAL:    Unknown if he will continue living with his parents. 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     PLAN:    Access will follow. May consider psychiatry consult. Discussed with primary RN.

## 2023-06-15 NOTE — PLAN OF CARE
Goal Outcome Evaluation:      Pt AO x 4, RA, c/o pain, restless, notified [facility], administered all meds per mar, Morphine x 3, Atarax x 1, clear liquid diet, BP elevated, no ss of acute distress noted, will continue to monitor.      Problem: Adult Inpatient Plan of Care  Goal: Plan of Care Review  Outcome: Ongoing, Progressing  Goal: Patient-Specific Goal (Individualized)  Outcome: Ongoing, Progressing  Goal: Absence of Hospital-Acquired Illness or Injury  Outcome: Ongoing, Progressing  Intervention: Identify and Manage Fall Risk  Recent Flowsheet Documentation  Taken 6/15/2023 0607 by Bebo Ramirez RN  Safety Promotion/Fall Prevention:   activity supervised   safety round/check completed  Taken 6/15/2023 0409 by Bebo Ramirez RN  Safety Promotion/Fall Prevention:   activity supervised   safety round/check completed  Taken 6/15/2023 0205 by Bebo Ramirez RN  Safety Promotion/Fall Prevention:   activity supervised   safety round/check completed   room organization consistent  Taken 6/15/2023 0016 by Bebo Ramirez RN  Safety Promotion/Fall Prevention:   activity supervised   safety round/check completed   room organization consistent  Taken 6/14/2023 2209 by Bebo Ramirez RN  Safety Promotion/Fall Prevention:   activity supervised   safety round/check completed  Taken 6/14/2023 2001 by Bebo Ramirez RN  Safety Promotion/Fall Prevention:   activity supervised   safety round/check completed   room organization consistent  Intervention: Prevent Skin Injury  Recent Flowsheet Documentation  Taken 6/15/2023 0607 by Bebo Ramirez RN  Body Position: position changed independently  Taken 6/15/2023 0409 by Bebo Ramirez RN  Body Position: position changed independently  Taken 6/15/2023 0205 by Bebo Ramirez RN  Body Position: position changed independently  Taken 6/15/2023 0016 by Bebo Ramirez RN  Body Position: position changed independently  Skin Protection: adhesive use  limited  Taken 6/14/2023 2209 by Bebo Ramirez RN  Body Position: position changed independently  Taken 6/14/2023 2001 by Bebo Ramirez RN  Body Position: position changed independently  Skin Protection: adhesive use limited  Intervention: Prevent and Manage VTE (Venous Thromboembolism) Risk  Recent Flowsheet Documentation  Taken 6/15/2023 0016 by Bebo Ramirez RN  Activity Management: bedrest  Range of Motion: active ROM (range of motion) encouraged  Taken 6/14/2023 2001 by Bebo Ramirez RN  Activity Management: bedrest  Range of Motion: active ROM (range of motion) encouraged  Intervention: Prevent Infection  Recent Flowsheet Documentation  Taken 6/15/2023 0607 by Bebo Ramirez RN  Infection Prevention:   hand hygiene promoted   rest/sleep promoted  Taken 6/15/2023 0409 by Bebo Ramirez RN  Infection Prevention:   hand hygiene promoted   rest/sleep promoted  Taken 6/15/2023 0205 by Bebo Ramirez RN  Infection Prevention:   hand hygiene promoted   rest/sleep promoted  Taken 6/15/2023 0016 by Bebo Ramirez RN  Infection Prevention:   hand hygiene promoted   rest/sleep promoted  Taken 6/14/2023 2209 by Bebo Ramirez RN  Infection Prevention:   rest/sleep promoted   hand hygiene promoted  Taken 6/14/2023 2001 by Bebo Ramirez RN  Infection Prevention:   hand hygiene promoted   rest/sleep promoted  Goal: Optimal Comfort and Wellbeing  Outcome: Ongoing, Progressing  Intervention: Provide Person-Centered Care  Recent Flowsheet Documentation  Taken 6/15/2023 0016 by Bebo Ramirez RN  Trust Relationship/Rapport: care explained  Taken 6/14/2023 2001 by Bebo Ramirez RN  Trust Relationship/Rapport:   care explained   choices provided  Goal: Readiness for Transition of Care  Outcome: Ongoing, Progressing     Problem: Skin Injury Risk Increased  Goal: Skin Health and Integrity  Outcome: Ongoing, Progressing  Intervention: Optimize Skin Protection  Recent Flowsheet  Documentation  Taken 6/15/2023 0607 by Bebo Ramirez RN  Head of Bed (HOB) Positioning: HOB elevated  Taken 6/15/2023 0409 by Bebo Ramirez RN  Head of Bed (HOB) Positioning: HOB elevated  Taken 6/15/2023 0205 by Bebo Ramirez RN  Head of Bed (HOB) Positioning: HOB elevated  Taken 6/15/2023 0016 by Bebo Ramirez RN  Pressure Reduction Techniques:   frequent weight shift encouraged   weight shift assistance provided  Head of Bed (HOB) Positioning: HOB elevated  Pressure Reduction Devices:   alternating pressure pump (ADD)   pressure-redistributing mattress utilized  Skin Protection: adhesive use limited  Taken 6/14/2023 2209 by Bebo Ramirez RN  Head of Bed (HOB) Positioning: HOB elevated  Taken 6/14/2023 2001 by Bebo Ramirez RN  Pressure Reduction Techniques:   frequent weight shift encouraged   weight shift assistance provided  Head of Bed (HOB) Positioning: HOB elevated  Pressure Reduction Devices:   alternating pressure pump (ADD)   pressure-redistributing mattress utilized  Skin Protection: adhesive use limited     Problem: Fall Injury Risk  Goal: Absence of Fall and Fall-Related Injury  Outcome: Ongoing, Progressing  Intervention: Identify and Manage Contributors  Recent Flowsheet Documentation  Taken 6/15/2023 0607 by Bebo Ramirez RN  Medication Review/Management: medications reviewed  Taken 6/15/2023 0409 by Bebo Ramirez RN  Medication Review/Management: medications reviewed  Taken 6/15/2023 0205 by Bebo Ramirez RN  Medication Review/Management: medications reviewed  Taken 6/15/2023 0016 by Bebo Ramirez RN  Medication Review/Management: medications reviewed  Self-Care Promotion: independence encouraged  Taken 6/14/2023 2209 by Bebo Ramirez RN  Medication Review/Management: medications reviewed  Taken 6/14/2023 2001 by Bebo Ramirez RN  Medication Review/Management: medications reviewed  Self-Care Promotion: independence  encouraged  Intervention: Promote Injury-Free Environment  Recent Flowsheet Documentation  Taken 6/15/2023 0607 by Bebo Ramirez RN  Safety Promotion/Fall Prevention:   activity supervised   safety round/check completed  Taken 6/15/2023 0409 by Bebo Ramirez RN  Safety Promotion/Fall Prevention:   activity supervised   safety round/check completed  Taken 6/15/2023 0205 by Bebo Ramirez RN  Safety Promotion/Fall Prevention:   activity supervised   safety round/check completed   room organization consistent  Taken 6/15/2023 0016 by Bebo Ramirez RN  Safety Promotion/Fall Prevention:   activity supervised   safety round/check completed   room organization consistent  Taken 6/14/2023 2209 by Bebo Ramirez RN  Safety Promotion/Fall Prevention:   activity supervised   safety round/check completed  Taken 6/14/2023 2001 by Bebo Ramirez RN  Safety Promotion/Fall Prevention:   activity supervised   safety round/check completed   room organization consistent     Problem: Alcohol Withdrawal  Goal: Alcohol Withdrawal Symptom Control  Outcome: Ongoing, Progressing     Problem: Acute Neurologic Deterioration (Alcohol Withdrawal)  Goal: Optimal Neurologic Function  Outcome: Ongoing, Progressing     Problem: Substance Misuse (Alcohol Withdrawal)  Goal: Readiness for Change Identified  Outcome: Ongoing, Progressing

## 2023-06-15 NOTE — SIGNIFICANT NOTE
06/15/23 0908   OTHER   Discipline physical therapist   Rehab Time/Intention   Session Not Performed other (see comments)  (awaiting neuro consult; pt up ad albino but has c/o intermittent dizziness with mobility; RN notified and will f/u tomorrow)   Therapy Assessment/Plan (PT)   Criteria for Skilled Interventions Met (PT) yes   Recommendation   PT - Next Appointment 06/16/23

## 2023-06-15 NOTE — CONSULTS
NEUROLOGY CONSULT     DOS: 6/15/2023  NAME: Mane Gunderson   : 1985  PCP: Dominique Ontiveros APRN  CC: Stroke  Referring MD: Dominique Ontiveros APRN      Neurological Problem and Interval History:  38 y.o. male  With psychiatric issues under the care of a psychiatrist and alcoholism who presents with chief complaint of dizziness. Patient is currently admitted from substance abuse rehabilitation with relapse and alcoholic hepatitis/pancreatitis. He was having abdominal pain, nausea and vomiting as well as dizziness. His other synptoms have improved. He continues with dizziness which is provoked by movement and head turning. He has no nystagmus. Additionally, he is endorsing visual and auditory hallucinations and difficulty with foggy thoughts. He endorses that he previously has had a seizure which he attracted to Haldol use. He has akathisia and choreiform type movements on exam which are temporarily suppressible.    Past Medical/Surgical Hx:  Past Medical History:   Diagnosis Date   • ADD (attention deficit disorder)    • Alcohol abuse    • Dyslexia    • Pancreatitis    • Posttraumatic stress disorder      Past Surgical History:   Procedure Laterality Date   • APPENDECTOMY N/A 8/10/2016    Procedure: APPENDECTOMY LAPAROSCOPIC;  Surgeon: Bird Vazquez Jr., MD;  Location: [facility];  Service:            Medications On Admission  Medications Prior to Admission   Medication Sig Dispense Refill Last Dose   • amphetamine-dextroamphetamine (ADDERALL) 10 MG tablet Take 1.5 tablets by mouth 2 (Two) Times a Day.      • busPIRone (BUSPAR) 10 MG tablet Take 1 tablet by mouth 3 (Three) Times a Day.      • cloNIDine (CATAPRES) 0.1 MG tablet Take 1 tablet by mouth 2 (Two) Times a Day As Needed. PRN      • cyclobenzaprine (FLEXERIL) 10 MG tablet Take 1 tablet by mouth 3 (Three) Times a Day As Needed for Muscle Spasms.      • fluticasone (FLONASE) 50 MCG/ACT nasal spray 2 sprays into the nostril(s) as directed by provider  Daily.      • hydrOXYzine pamoate (VISTARIL) 50 MG capsule Take 1 capsule by mouth 4 (Four) Times a Day As Needed.      • methocarbamol (ROBAXIN) 750 MG tablet Take 1 tablet by mouth 3 (Three) Times a Day.      • mirtazapine (REMERON) 15 MG tablet Take 1 tablet by mouth Every Night.      • omeprazole (priLOSEC) 20 MG capsule Take 1 capsule by mouth Daily.      • Pramoxine HCl, Perianal, 1 % foam Insert  into the rectum Every 2 (Two) Hours As Needed for Hemorrhoids.      • propranolol (INDERAL) 20 MG tablet Take 1 tablet by mouth 3 (Three) Times a Day.      • venlafaxine XR (EFFEXOR-XR) 150 MG 24 hr capsule Take 225 mg by mouth Every Morning.      • hyoscyamine sulfate (ANASPAZ) 0.125 MG tablet dispersible disintegrating tablet Place 125 mcg on the tongue Every 4 (Four) Hours As Needed.      • L-Methylfolate-Algae (DEPLIN 7.5 PO) Take 1 capsule by mouth Daily.          Allergies:  Allergies   Allergen Reactions   • Ativan [Lorazepam] Mental Status Change   • Haldol [Haloperidol Lactate] Other (See Comments)     seizure   • Codeine Itching       Social Hx:  Social History     Socioeconomic History   • Marital status: Single   Tobacco Use   • Smoking status: Every Day     Packs/day: 1.00     Types: Electronic Cigarette, Cigarettes   • Smokeless tobacco: Never   Vaping Use   • Vaping Use: Every day   Substance and Sexual Activity   • Alcohol use: Yes     Comment: ONE PINT DAILY   • Drug use: No   • Sexual activity: Defer       Family Hx:  Family History   Problem Relation Age of Onset   • Obesity Other        Review of Imaging (Interpretation of images not reports):  HCT unremarkable    Laboratory Results:   Lab Results   Component Value Date    GLUCOSE 98 06/15/2023    CALCIUM 8.6 06/15/2023     (L) 06/15/2023    K 3.7 06/15/2023    CO2 29.0 06/15/2023    CL 95 (L) 06/15/2023    BUN 8 06/15/2023    CREATININE 0.98 06/15/2023    EGFRIFNONA 84 02/09/2022    BCR 8.2 06/15/2023    ANIONGAP 9.0 06/15/2023     Lab  Results   Component Value Date    WBC 5.74 06/15/2023    HGB 13.9 06/15/2023    HCT 39.1 06/15/2023    MCV 88.7 06/15/2023     (L) 06/15/2023     No results found for: CHOL  Lab Results   Component Value Date    HDL 36 (L) 08/23/2014     Lab Results   Component Value Date    LDL 87 08/23/2014     (H) 08/23/2014     Lab Results   Component Value Date    TRIG 502 (H) 08/23/2014     Lab Results   Component Value Date    HGBA1C 4.8 03/29/2022     Lab Results   Component Value Date    INR 1.04 06/15/2023    INR 0.96 09/10/2021    INR 0.9 08/23/2014    PROTIME 13.8 06/15/2023    PROTIME 12.6 09/10/2021    PROTIME 10.2 08/23/2014         Physical Examination: Patient is awake and alert, neurologically non-focal, full strength throughout, CNs intact, no sensory deficits, he has akathisia and choreiform type movements which are temporarily suppressible.      Diagnoses / Discussion:    38 y.o. male  With psychiatric issues under the care of a psychiatrist and alcoholism who presents with chief complaint of dizziness with movement as well as akathisia/chorioform movents with hallucinations.      Plan:  -MRI brain w/wo  -EEG (hallucinations)  -B12, folate, TSH, ammonia, copper, ceruloplasmin  -psychiatry consulting  -orthostatic vs  -PT for Epley maneuver     I have discussed the above with the patient and family.  Time spent with patient: 60min    MDM  Reviewed: previous chart, nursing note and vitals  Reviewed previous: labs and CT scan  Interpretation: labs and CT scan  Consults: neurology         Elaina Tam MD  Neurology

## 2023-06-15 NOTE — CONSULTS
[facility]  Initial Inpatient Consult Note    Referring Provider: [facility]     Reason for Consultation: Alcoholic hepatitis    Subjective     History of present illness:      Thank you for allowing us to participate in the care of this patient.    38 y.o. male with a history of posttraumatic stress disorder, ADD, pancreatitis along with alcoholism who presented to the emergency room yesterday planing of dizziness, and balance, vomiting, diarrhea and cough.  Patient admits to drinking a pint of liquor a day.  Admission reported last drink 2 to 3 days ago.  In addition to the above-mentioned symptoms he also reported jaundice x3 days.    At present patient denies abdominal pain, nausea or vomiting.  He ate a regular diet without difficulty this morning.  He reports 1 or 2 bowel movements yesterday formed without rectal pain or rectal bleeding.    Reviewed labs from this morning - Tbil 9 (12.6),  (300),  (451),  (566), PT/INR 13.8/1.04.  Acute hepatitis panel negative.  Lipase yesterday 78.    Current CIWA score 3    Reviewed CT A/P with contrast - improved findings of pancreatitis, possible residual or recurrent pancreatitis, hepatic steatosis.    DF 12.7     Past Medical History:  Past Medical History:   Diagnosis Date    ADD (attention deficit disorder)     Alcohol abuse     Dyslexia     Pancreatitis     Posttraumatic stress disorder      Past Surgical History:  Past Surgical History:   Procedure Laterality Date    APPENDECTOMY N/A 8/10/2016    Procedure: APPENDECTOMY LAPAROSCOPIC;  Surgeon: Bird Vazquez Jr., MD;  Location: [facility];  Service:       Social History:   Social History     Tobacco Use    Smoking status: Every Day     Packs/day: 1.00     Types: Electronic Cigarette, Cigarettes    Smokeless tobacco: Never   Substance Use Topics    Alcohol use: Yes     Comment: ONE PINT DAILY      Family History:  Family History   Problem Relation Age of Onset    Obesity  Other        Home Meds:  Medications Prior to Admission   Medication Sig Dispense Refill Last Dose    amphetamine-dextroamphetamine (ADDERALL) 10 MG tablet Take 1.5 tablets by mouth 2 (Two) Times a Day.       busPIRone (BUSPAR) 10 MG tablet Take 1 tablet by mouth 3 (Three) Times a Day.       cloNIDine (CATAPRES) 0.1 MG tablet Take 1 tablet by mouth 2 (Two) Times a Day As Needed. PRN       cyclobenzaprine (FLEXERIL) 10 MG tablet Take 1 tablet by mouth 3 (Three) Times a Day As Needed for Muscle Spasms.       fluticasone (FLONASE) 50 MCG/ACT nasal spray 2 sprays into the nostril(s) as directed by provider Daily.       hydrOXYzine pamoate (VISTARIL) 50 MG capsule Take 1 capsule by mouth 4 (Four) Times a Day As Needed.       methocarbamol (ROBAXIN) 750 MG tablet Take 1 tablet by mouth 3 (Three) Times a Day.       mirtazapine (REMERON) 15 MG tablet Take 1 tablet by mouth Every Night.       omeprazole (priLOSEC) 20 MG capsule Take 1 capsule by mouth Daily.       Pramoxine HCl, Perianal, 1 % foam Insert  into the rectum Every 2 (Two) Hours As Needed for Hemorrhoids.       propranolol (INDERAL) 20 MG tablet Take 1 tablet by mouth 3 (Three) Times a Day.       venlafaxine XR (EFFEXOR-XR) 150 MG 24 hr capsule Take 225 mg by mouth Every Morning.       hyoscyamine sulfate (ANASPAZ) 0.125 MG tablet dispersible disintegrating tablet Place 125 mcg on the tongue Every 4 (Four) Hours As Needed.       L-Methylfolate-Algae (DEPLIN 7.5 PO) Take 1 capsule by mouth Daily.        Current Meds:   busPIRone, 10 mg, Oral, TID  chlordiazePOXIDE, 25 mg, Oral, TID  folic acid, 1 mg, Oral, Daily  mirtazapine, 15 mg, Oral, Nightly  pantoprazole, 40 mg, Oral, BID AC  propranolol, 20 mg, Oral, TID  senna-docusate sodium, 2 tablet, Oral, BID  thiamine (B-1) IV, 200 mg, Intravenous, Q8H   Followed by  [START ON 6/20/2023] thiamine, 100 mg, Oral, Daily  venlafaxine XR, 225 mg, Oral, Daily      Allergies:  Allergies   Allergen Reactions    Ativan  [Lorazepam] Mental Status Change    Haldol [Haloperidol Lactate] Other (See Comments)     seizure    Codeine Itching     Review of Systems  History obtained from chart review and the patient  Gastrointestinal ROS: no abdominal pain, change in bowel habits, or black or bloody stools    Objective     Vital Signs  Temp:  [97.6 °F (36.4 °C)-98.9 °F (37.2 °C)] 97.6 °F (36.4 °C)  Heart Rate:  [] 90  Resp:  [16-18] 16  BP: (139-159)/() 159/108  Physical Exam:   Constitutional:    Alert, cooperative, in no acute distress    Abdomen:     Soft, nondistended, nontender; normal bowel sounds   Results Review:   I reviewed the patient's new clinical results.    Results from last 7 days   Lab Units 06/15/23  0538 06/14/23  0955   WBC 10*3/mm3 5.74 6.81   HEMOGLOBIN g/dL 13.9 15.8   HEMATOCRIT % 39.1 44.3   PLATELETS 10*3/mm3 119* 144     Results from last 7 days   Lab Units 06/15/23  0538 06/14/23  0955   SODIUM mmol/L 133* 126*   POTASSIUM mmol/L 3.7 3.4*   CHLORIDE mmol/L 95* 85*   CO2 mmol/L 29.0 28.7   BUN mg/dL 8 19   CREATININE mg/dL 0.98 0.77   CALCIUM mg/dL 8.6 9.9   BILIRUBIN mg/dL 9.0* 12.6*   ALK PHOS U/L 238* 300*   ALT (SGPT) U/L 343* 451*   AST (SGOT) U/L 422* 566*   GLUCOSE mg/dL 98 148*     Results from last 7 days   Lab Units 06/15/23  0744   INR  1.04     Lab Results   Lab Value Date/Time    LIPASE 78 (H) 06/14/2023 0955    LIPASE 789 (H) 04/25/2022 0158    LIPASE 852 (H) 02/09/2022 1618    LIPASE 136 (H) 09/10/2021 1052    LIPASE 20 02/17/2021 1545    LIPASE 16 02/19/2019 1529    LIPASE 30 05/25/2018 1519    LIPASE 163 (H) 08/26/2014 0623    LIPASE 649 (H) 08/25/2014 0612    LIPASE 1,764 (H) 08/24/2014 0445    LIPASE 2,992 (H) 08/23/2014 0428    LIPASE 1,271 (H) 08/22/2014 1340       Radiology:  CT Abdomen Pelvis With Contrast   Final Result   1. Improved findings of pancreatitis, with possible residual or   recurrent pancreatitis demonstrated   2. Hepatic steatosis   3. Additional incidental  findings as above.       This report was finalized on 6/14/2023 10:57 AM by Dr. Giacomo Jain M.D.          CT Head Without Contrast   Final Result   No acute process identified.               Radiation dose reduction techniques were utilized, including automated   exposure control and exposure modulation based on body size.       This report was finalized on 6/15/2023 10:41 AM by Dr. Issac Rodriguez M.D.          XR Chest 1 View   Final Result   Negative.       This report was finalized on 6/14/2023 10:42 AM by Dr. Jonatan Wheat M.D.              Assessment & Plan   Active Hospital Problems    Diagnosis     **Alcoholic hepatitis, unspecified whether ascites present     Anxiety     Bipolar I disorder        Assessment:  Alcoholic hepatitis  Alcoholism  Hyperbilirubinemia with elevated liver function tests  Abnormal CT findings of improved pancreatitis  Hepatic steatosis    Plan:  Patient is without GI complaints at the moment.  Physical examination is essentially unremarkable aside from jaundice.  Recommend continuing to monitor liver function test.  Discriminant function 12.7 no indication for steroids at the moment.  Continue regular diet.  Continue CIWA protocol per primary team.  Recommend absolute alcohol cessation outpatient treatment program upon discharge.    I discussed the patients findings and my recommendations with Dr. Streeter and patient.    Alverto dictation used throughout this note.          JAMAL Covarrubias  [facility]  [address] [place] [address]  Office: [phone]

## 2023-06-16 LAB
ALBUMIN SERPL-MCNC: 3.7 G/DL (ref 3.5–5.2)
ALBUMIN/GLOB SERPL: 1.3 G/DL
ALP SERPL-CCNC: 221 U/L (ref 39–117)
ALT SERPL W P-5'-P-CCNC: 279 U/L (ref 1–41)
ANION GAP SERPL CALCULATED.3IONS-SCNC: 10.9 MMOL/L (ref 5–15)
AST SERPL-CCNC: 262 U/L (ref 1–40)
BASOPHILS # BLD AUTO: 0.05 10*3/MM3 (ref 0–0.2)
BASOPHILS NFR BLD AUTO: 1.3 % (ref 0–1.5)
BILIRUB SERPL-MCNC: 6.1 MG/DL (ref 0–1.2)
BUN SERPL-MCNC: 6 MG/DL (ref 6–20)
BUN/CREAT SERPL: 7.1 (ref 7–25)
CALCIUM SPEC-SCNC: 9.3 MG/DL (ref 8.6–10.5)
CHLORIDE SERPL-SCNC: 96 MMOL/L (ref 98–107)
CO2 SERPL-SCNC: 29.1 MMOL/L (ref 22–29)
CREAT SERPL-MCNC: 0.85 MG/DL (ref 0.76–1.27)
DEPRECATED RDW RBC AUTO: 43.2 FL (ref 37–54)
EGFRCR SERPLBLD CKD-EPI 2021: 114.8 ML/MIN/1.73
EOSINOPHIL # BLD AUTO: 0.12 10*3/MM3 (ref 0–0.4)
EOSINOPHIL NFR BLD AUTO: 3 % (ref 0.3–6.2)
ERYTHROCYTE [DISTWIDTH] IN BLOOD BY AUTOMATED COUNT: 13.3 % (ref 12.3–15.4)
GLOBULIN UR ELPH-MCNC: 2.8 GM/DL
GLUCOSE SERPL-MCNC: 104 MG/DL (ref 65–99)
HCT VFR BLD AUTO: 35.1 % (ref 37.5–51)
HGB BLD-MCNC: 12 G/DL (ref 13–17.7)
INR PPP: 1.03 (ref 0.9–1.1)
LYMPHOCYTES # BLD AUTO: 1.59 10*3/MM3 (ref 0.7–3.1)
LYMPHOCYTES NFR BLD AUTO: 40.2 % (ref 19.6–45.3)
MAGNESIUM SERPL-MCNC: 1.9 MG/DL (ref 1.6–2.6)
MCH RBC QN AUTO: 31 PG (ref 26.6–33)
MCHC RBC AUTO-ENTMCNC: 34.2 G/DL (ref 31.5–35.7)
MCV RBC AUTO: 90.7 FL (ref 79–97)
MONOCYTES # BLD AUTO: 0.39 10*3/MM3 (ref 0.1–0.9)
MONOCYTES NFR BLD AUTO: 9.8 % (ref 5–12)
NEUTROPHILS NFR BLD AUTO: 1.77 10*3/MM3 (ref 1.7–7)
NEUTROPHILS NFR BLD AUTO: 44.7 % (ref 42.7–76)
PHOSPHATE SERPL-MCNC: 3.5 MG/DL (ref 2.5–4.5)
PLATELET # BLD AUTO: 123 10*3/MM3 (ref 140–450)
PMV BLD AUTO: 10.4 FL (ref 6–12)
POTASSIUM SERPL-SCNC: 4.1 MMOL/L (ref 3.5–5.2)
PROT SERPL-MCNC: 6.5 G/DL (ref 6–8.5)
PROTHROMBIN TIME: 13.6 SECONDS (ref 11.7–14.2)
RBC # BLD AUTO: 3.87 10*6/MM3 (ref 4.14–5.8)
SODIUM SERPL-SCNC: 136 MMOL/L (ref 136–145)
WBC NRBC COR # BLD: 3.96 10*3/MM3 (ref 3.4–10.8)

## 2023-06-16 PROCEDURE — 25010000002 SODIUM CHLORIDE 0.9 % WITH KCL 20 MEQ 20-0.9 MEQ/L-% SOLUTION: Performed by: INTERNAL MEDICINE

## 2023-06-16 PROCEDURE — 85610 PROTHROMBIN TIME: CPT | Performed by: NURSE PRACTITIONER

## 2023-06-16 PROCEDURE — 80053 COMPREHEN METABOLIC PANEL: CPT | Performed by: NURSE PRACTITIONER

## 2023-06-16 PROCEDURE — 96361 HYDRATE IV INFUSION ADD-ON: CPT

## 2023-06-16 PROCEDURE — 99232 SBSQ HOSP IP/OBS MODERATE 35: CPT

## 2023-06-16 PROCEDURE — 25010000002 THIAMINE HCL 200 MG/2ML SOLUTION: Performed by: INTERNAL MEDICINE

## 2023-06-16 PROCEDURE — 83735 ASSAY OF MAGNESIUM: CPT | Performed by: STUDENT IN AN ORGANIZED HEALTH CARE EDUCATION/TRAINING PROGRAM

## 2023-06-16 PROCEDURE — 99232 SBSQ HOSP IP/OBS MODERATE 35: CPT | Performed by: NURSE PRACTITIONER

## 2023-06-16 PROCEDURE — 84100 ASSAY OF PHOSPHORUS: CPT | Performed by: STUDENT IN AN ORGANIZED HEALTH CARE EDUCATION/TRAINING PROGRAM

## 2023-06-16 PROCEDURE — 85025 COMPLETE CBC W/AUTO DIFF WBC: CPT | Performed by: NURSE PRACTITIONER

## 2023-06-16 PROCEDURE — 96376 TX/PRO/DX INJ SAME DRUG ADON: CPT

## 2023-06-16 RX ADMIN — THIAMINE HYDROCHLORIDE 200 MG: 100 INJECTION, SOLUTION INTRAMUSCULAR; INTRAVENOUS at 13:43

## 2023-06-16 RX ADMIN — MIRTAZAPINE 15 MG: 15 TABLET, FILM COATED ORAL at 20:29

## 2023-06-16 RX ADMIN — PROPRANOLOL HYDROCHLORIDE 20 MG: 20 TABLET ORAL at 08:24

## 2023-06-16 RX ADMIN — THIAMINE HYDROCHLORIDE 200 MG: 100 INJECTION, SOLUTION INTRAMUSCULAR; INTRAVENOUS at 22:09

## 2023-06-16 RX ADMIN — PANTOPRAZOLE SODIUM 40 MG: 40 TABLET, DELAYED RELEASE ORAL at 07:38

## 2023-06-16 RX ADMIN — HYDROXYZINE HYDROCHLORIDE 25 MG: 25 TABLET ORAL at 10:01

## 2023-06-16 RX ADMIN — CHLORDIAZEPOXIDE HYDROCHLORIDE 25 MG: 25 CAPSULE ORAL at 08:25

## 2023-06-16 RX ADMIN — PANTOPRAZOLE SODIUM 40 MG: 40 TABLET, DELAYED RELEASE ORAL at 16:40

## 2023-06-16 RX ADMIN — Medication 3 MG: at 23:13

## 2023-06-16 RX ADMIN — BUSPIRONE HYDROCHLORIDE 10 MG: 10 TABLET ORAL at 20:29

## 2023-06-16 RX ADMIN — PROPRANOLOL HYDROCHLORIDE 20 MG: 20 TABLET ORAL at 20:29

## 2023-06-16 RX ADMIN — THIAMINE HYDROCHLORIDE 200 MG: 100 INJECTION, SOLUTION INTRAMUSCULAR; INTRAVENOUS at 07:38

## 2023-06-16 RX ADMIN — DOCUSATE SODIUM 50MG AND SENNOSIDES 8.6MG 2 TABLET: 8.6; 5 TABLET, FILM COATED ORAL at 08:24

## 2023-06-16 RX ADMIN — HYDROXYZINE HYDROCHLORIDE 25 MG: 25 TABLET ORAL at 02:10

## 2023-06-16 RX ADMIN — BUSPIRONE HYDROCHLORIDE 10 MG: 10 TABLET ORAL at 08:25

## 2023-06-16 RX ADMIN — VENLAFAXINE HYDROCHLORIDE 225 MG: 150 CAPSULE, EXTENDED RELEASE ORAL at 08:25

## 2023-06-16 RX ADMIN — OXYCODONE HYDROCHLORIDE 5 MG: 5 TABLET ORAL at 20:29

## 2023-06-16 RX ADMIN — POTASSIUM CHLORIDE AND SODIUM CHLORIDE 100 ML/HR: 900; 150 INJECTION, SOLUTION INTRAVENOUS at 12:11

## 2023-06-16 RX ADMIN — OXYCODONE HYDROCHLORIDE 5 MG: 5 TABLET ORAL at 10:01

## 2023-06-16 RX ADMIN — POTASSIUM CHLORIDE AND SODIUM CHLORIDE 100 ML/HR: 900; 150 INJECTION, SOLUTION INTRAVENOUS at 02:06

## 2023-06-16 RX ADMIN — DOCUSATE SODIUM 50MG AND SENNOSIDES 8.6MG 2 TABLET: 8.6; 5 TABLET, FILM COATED ORAL at 20:28

## 2023-06-16 RX ADMIN — CHLORDIAZEPOXIDE HYDROCHLORIDE 25 MG: 25 CAPSULE ORAL at 16:40

## 2023-06-16 RX ADMIN — CHLORDIAZEPOXIDE HYDROCHLORIDE 25 MG: 25 CAPSULE ORAL at 20:28

## 2023-06-16 RX ADMIN — PROPRANOLOL HYDROCHLORIDE 20 MG: 20 TABLET ORAL at 16:40

## 2023-06-16 RX ADMIN — Medication 1 MG: at 08:24

## 2023-06-16 RX ADMIN — Medication 3 MG: at 00:09

## 2023-06-16 RX ADMIN — BUSPIRONE HYDROCHLORIDE 10 MG: 10 TABLET ORAL at 16:40

## 2023-06-16 NOTE — PLAN OF CARE
Goal Outcome Evaluation:  Plan of Care Reviewed With: (P) patient           Outcome Evaluation: (P) Pt is AOx4-NSR-RA. Pt seems to be very anxious today-pt medicated per MAR. Pt ambulating in and out the room often. IVF infusing. Audiable hallutinations present- pt able to differenciate between what is real and what is not. No acute distress. Will continue to monitor.

## 2023-06-16 NOTE — PROGRESS NOTES
Name: Mane Gunderson ADMIT: 2023   : 1985  PCP: Dominique Ontiveros APRN    MRN: [id number] LOS: 2 days   AGE/SEX: 38 y.o. male  ROOM: [facility]     Subjective   Subjective   Sitting up in the bed, states he was feeling very anxious this morning, which has improved.  Overall feeling better compared to yesterday, dizziness improved.  Denies chest pain, shortness of breath, nausea, vomiting.    Review of Systems     Objective   Objective   Vital Signs  Temp:  [97.6 °F (36.4 °C)-98.6 °F (37 °C)] 97.6 °F (36.4 °C)  Heart Rate:  [79-95] 87  Resp:  [16-18] 18  BP: (123-149)/() 129/88     on   ;   Device (Oxygen Therapy): room air  Body mass index is 27.97 kg/m².  Physical Exam    General: Alert, sitting up in the bed, not in distress, ll-appearing, anxious  HEENT: Normocephalic, atraumatic, + scleral icterus  CV: Regular rate and rhythm, no murmurs rubs or gallops  Lungs: Clear to auscultation bilaterally, no crackles or wheezes  Abdomen: Soft, nontender, nondistended  Extremities: No significant peripheral edema , no cyanosis     Results Review     I reviewed the patient's new clinical results.  Results from last 7 days   Lab Units 23  0641 06/15/23  0538 23  0955   WBC 10*3/mm3 3.96 5.74 6.81   HEMOGLOBIN g/dL 12.0* 13.9 15.8   PLATELETS 10*3/mm3 123* 119* 144     Results from last 7 days   Lab Units 23  0641 06/15/23  0538 23  0955   SODIUM mmol/L 136 133* 126*   POTASSIUM mmol/L 4.1 3.7 3.4*   CHLORIDE mmol/L 96* 95* 85*   CO2 mmol/L 29.1* 29.0 28.7   BUN mg/dL 6 8 19   CREATININE mg/dL 0.85 0.98 0.77   GLUCOSE mg/dL 104* 98 148*   Estimated Creatinine Clearance: 129.3 mL/min (by C-G formula based on SCr of 0.85 mg/dL).  Results from last 7 days   Lab Units 23  0641 06/15/23  0538 23  0955   ALBUMIN g/dL 3.7 4.0 4.3   BILIRUBIN mg/dL 6.1* 9.0* 12.6*   ALK PHOS U/L 221* 238* 300*   AST (SGOT) U/L 262* 422* 566*   ALT (SGPT) U/L 279* 343* 451*     Results from last 7  days   Lab Units 06/16/23  0641 06/15/23  0538 06/14/23  0955   CALCIUM mg/dL 9.3 8.6 9.9   ALBUMIN g/dL 3.7 4.0 4.3   MAGNESIUM mg/dL 1.9 2.3  --    PHOSPHORUS mg/dL 3.5 2.1*  --      Results from last 7 days   Lab Units 06/14/23  0955   LACTATE mmol/L 1.4     COVID19   Date Value Ref Range Status   04/25/2022 Not Detected Not Detected - Ref. Range Final   09/10/2021 Not Detected Not Detected - Ref. Range Final     No results found for: HGBA1C, POCGLU        MRI Brain With & Without Contrast  Narrative: BRAIN MRI WITH AND WITHOUT CONTRAST     HISTORY: Dizziness, arrhythmia or new vasoactive medication;  K70.10-Alcoholic hepatitis without ascites; K85.90-Acute pancreatitis  without necrosis or infection, unspecified; K86.1-Other chronic  pancreatitis     COMPARISON: 06/14/2023.     FINDINGS:  Multiplanar images of the head were obtained without and with  gadolinium. No areas of restricted diffusion are seen to suggest acute  infarct. Ventricles are normal in size. There is no midline shift or  mass effect. There is really no significant microangiopathic change. The  intracranial flow voids appear intact. No abnormality is seen on  susceptibility weighted imaging. Postcontrast imaging does not  demonstrate any abnormal enhancement. There is no evidence of venous  occlusion. There is mucosal thickening noted within the ethmoid sinuses.     Impression: 1. No acute intracranial abnormality.   No abnormal enhancement.     This report was finalized on 6/15/2023 10:17 PM by Dr. Lise Almanza M.D.     EEG  Date of onset: 6/15/2023  Date of offset: 6/15/2023     Indication: 38 year old man with hallucinations.       Technical description:  This is a 21 channel digital EEG recording that   began on 3:23 PM and ended on 3:49 PM.  Electrodes were placed based on   the 10-20 international electrode placement system.       Background:  The EEG recording demonstrates normal background activity   with mainly alpha frequencies  with intermixed theta frequencies.  10 Hz   frequencies are present posteriorly and symmetrically.  The patient enters   drowsiness but no well formed stage 1 and 2 sleep with architecture is   present.  Hyperventilation and photic stimulation were performed with no   additional abnormalities noted.  There are no asymmetries between the two   hemispheres.  No interictal activity is present.     The EKG monitor shows a heart rate that is around 90 beats per minute.     Clinical interpretation:  This routine awake and drowsy EEG is normal.  No   potentially epileptogenic activity, seizure activity, or focal slowing is   present.  Careful clinical correlation is advised.    CT Head Without Contrast  Narrative: CT OF THE BRAIN WITHOUT CONTRAST 06/14/2023     HISTORY: Mental status change. Dizziness.     Axial images were obtained through the brain without intravenous  contrast.     The brain parenchyma and ventricular system appear within normal limits.  No mass lesions, midline shift, intracranial hemorrhage or evidence of  infarction is demonstrated.     Impression: No acute process identified.           Radiation dose reduction techniques were utilized, including automated  exposure control and exposure modulation based on body size.     This report was finalized on 6/15/2023 10:41 AM by Dr. Issac Rodriguez M.D.       Scheduled Medications  busPIRone, 10 mg, Oral, TID  chlordiazePOXIDE, 25 mg, Oral, TID  folic acid, 1 mg, Oral, Daily  mirtazapine, 15 mg, Oral, Nightly  pantoprazole, 40 mg, Oral, BID AC  propranolol, 20 mg, Oral, TID  senna-docusate sodium, 2 tablet, Oral, BID  thiamine (B-1) IV, 200 mg, Intravenous, Q8H   Followed by  [START ON 6/20/2023] thiamine, 100 mg, Oral, Daily  venlafaxine XR, 225 mg, Oral, Daily    Infusions  sodium chloride 0.9 % with KCl 20 mEq, 100 mL/hr, Last Rate: 100 mL/hr (06/16/23 0206)    Diet  Diet: Cardiac Diets; Healthy Heart (2-3 Na+); Texture: Regular Texture (IDDSI 7);  Fluid Consistency: Thin (IDDSI 0)    I have personally reviewed     [x]  Laboratory   []  Microbiology   [x]  Radiology   []  EKG/Telemetry  []  Cardiology/Vascular   []  Pathology    []  Records       Assessment/Plan     Active Hospital Problems    Diagnosis  POA    **Alcoholic hepatitis, unspecified whether ascites present [K70.10]  Yes    Anxiety [F41.9]  Yes    Bipolar I disorder [F31.9]  Yes      Resolved Hospital Problems   No resolved problems to display.         Patient is a  36 y.o. with a history of alcohol dependence, pancreatitis presents today with complaints of dizziness, abdominal pain , vomiting, diarrhea, and cough.        Alcohol use disorder/alcoholic hepatitis:  -CT abdomen and pelvis 06/14-1. Improved findings of pancreatitis, with possible residual orrecurrent pancreatitis demonstrated2. Hepatic steatosis  -Continue IV fluids, antiemetics, pain management  -LFT/bilirubin s improving  -Access following  -Avoid Tylenol in the setting of elevated LFTs  To daily CMP  -History of allergies to Ativan, continue chlordiazepoxide 25 mg tid,   Monitor CIWA (last 4 CIWA scores between 5-10.  GI consult, no indication for steroids for alcohol hepatitis, pretax        Dizziness.  Associated with head movement, concerning for vertigo  -Orthostatics negative  MRI brain with no acute findings  Neurology following appreciate recs    Anxiety/ADHD/bipolar/PTSD-continue mirtazapine, venlafaxine, buspirone.  Psychiatry consulted.      SCDs for DVT prophylaxis.  Full code.  Discussed with patient.  Anticipate discharge home with family  in 2-3 days.      Copied text in this note has been reviewed and is accurate as of 06/16/23.         Dictated utilizing Dragon dictation        Alfredo Torres MD  [facility]  06/16/23  09:55 EDT

## 2023-06-16 NOTE — SIGNIFICANT NOTE
06/16/23 1109   OTHER   Discipline physical therapist   Rehab Time/Intention   Session Not Performed other (see comments)  (Pt is mobilizing independently in room and per RN, pt reports improvement in vertigo/dizziness symptoms. Recommend follow up with OP PT for vestibular eval if symptoms should persist. PT signing off. Discussed with RN)

## 2023-06-16 NOTE — PROGRESS NOTES
"DOS: 2023  NAME: Mane Gunderson   : 1985  PCP: Dominique Ontiveros APRN  Chief Complaint   Patient presents with    Vomiting    Balance Issues     General    Subjective:     Interval History  Pt seen in follow up today, however the problem is new to the examiner.  Patient Complaints:  No acute events overnight. Patient standing up in room by bed conversing with nursing staff. He reports dizziness is resolved. Has several questions about his neuroimaging.   History taken from: patient chart    Objective:  Vital signs: /88 (BP Location: Left arm, Patient Position: Lying)   Pulse 87   Temp 97.6 °F (36.4 °C) (Oral)   Resp 18   Ht 175.3 cm (69\")   Wt 85.9 kg (189 lb 6 oz)   SpO2 97%   BMI 27.97 kg/m²       Physical Exam:  GENERAL: NAD  HEENT: Normocephalic, atraumatic   COR: RRR  Resp: Even and unlabored  Skin: Warm and dry    Neurological:   MS: AO to person, place, time No neglect. Recent and remote memory intact. Delusional. Rambling, tangential speech.  CN: visual acuity grossly normal, PERRL, EOMI, no nystagmus, no facial droop, no dysarthria  Motor: 5/5 strength in all 4 ext. Normal tone. Restless at times.   Sensory: Intact to light touch in all four ext.  Coordination: Normal finger to nose  Rapid alternating movements: Normal finger to thumb tap  Gait and station: Deferred    Results Review:     I reviewed the patient's new clinical results.  I reviewed the patient's new imaging results and agree with the interpretation.  I reviewed the patient's other test results and agree with the interpretation  I personally viewed and interpreted the patient's EKG/Telemetry data  Discussed with Dr. Tam    Current Medications:  Scheduled Medications:busPIRone, 10 mg, Oral, TID  chlordiazePOXIDE, 25 mg, Oral, TID  folic acid, 1 mg, Oral, Daily  mirtazapine, 15 mg, Oral, Nightly  pantoprazole, 40 mg, Oral, BID AC  propranolol, 20 mg, Oral, TID  senna-docusate sodium, 2 tablet, Oral, BID  thiamine (B-1) " IV, 200 mg, Intravenous, Q8H   Followed by  [START ON 6/20/2023] thiamine, 100 mg, Oral, Daily  venlafaxine XR, 225 mg, Oral, Daily      Infusions: sodium chloride 0.9 % with KCl 20 mEq, 100 mL/hr, Last Rate: 100 mL/hr (06/16/23 0206)      PRN Medications:    senna-docusate sodium **AND** polyethylene glycol **AND** bisacodyl **AND** bisacodyl    Calcium Replacement - Follow Nurse / BPA Driven Protocol    cloNIDine    hydrOXYzine    Magnesium Standard Dose Replacement - Follow Nurse / BPA Driven Protocol    Magnesium Standard Dose Replacement - Follow Nurse / BPA Driven Protocol    melatonin    metoclopramide    Morphine    ondansetron **OR** ondansetron    oxyCODONE    Phosphorus Replacement - Follow Nurse / BPA Driven Protocol    Potassium Replacement - Follow Nurse / BPA Driven Protocol    Laboratory results:  Lab Results   Component Value Date    GLUCOSE 104 (H) 06/16/2023    CALCIUM 9.3 06/16/2023     06/16/2023    K 4.1 06/16/2023    CO2 29.1 (H) 06/16/2023    CL 96 (L) 06/16/2023    BUN 6 06/16/2023    CREATININE 0.85 06/16/2023    EGFRIFNONA 84 02/09/2022    BCR 7.1 06/16/2023    ANIONGAP 10.9 06/16/2023     Lab Results   Component Value Date    WBC 3.96 06/16/2023    HGB 12.0 (L) 06/16/2023    HCT 35.1 (L) 06/16/2023    MCV 90.7 06/16/2023     (L) 06/16/2023          Lab Results   Component Value Date    INR 1.03 06/16/2023    INR 1.04 06/15/2023    INR 0.96 09/10/2021    PROTIME 13.6 06/16/2023    PROTIME 13.8 06/15/2023    PROTIME 12.6 09/10/2021     Lab Results   Component Value Date    TSH 0.858 06/15/2023     No components found for: LDLCALC  Lab Results   Component Value Date    HGBA1C 4.8 03/29/2022     No components found for: B12    Review and interpretation of imaging:   EEG    Result Date: 6/15/2023  Date of onset: 6/15/2023 Date of offset: 6/15/2023  Indication: 38 year old man with hallucinations.   Technical description:  This is a 21 channel digital EEG recording that began on  3:23 PM and ended on 3:49 PM.  Electrodes were placed based on the 10-20 international electrode placement system.   Background:  The EEG recording demonstrates normal background activity with mainly alpha frequencies with intermixed theta frequencies.  10 Hz frequencies are present posteriorly and symmetrically.  The patient enters drowsiness but no well formed stage 1 and 2 sleep with architecture is present.  Hyperventilation and photic stimulation were performed with no additional abnormalities noted.  There are no asymmetries between the two hemispheres.  No interictal activity is present.  The EKG monitor shows a heart rate that is around 90 beats per minute.  Clinical interpretation:  This routine awake and drowsy EEG is normal.  No potentially epileptogenic activity, seizure activity, or focal slowing is present.  Careful clinical correlation is advised.      CT Head Without Contrast    Result Date: 6/15/2023  CT OF THE BRAIN WITHOUT CONTRAST 06/14/2023  HISTORY: Mental status change. Dizziness.  Axial images were obtained through the brain without intravenous contrast.  The brain parenchyma and ventricular system appear within normal limits. No mass lesions, midline shift, intracranial hemorrhage or evidence of infarction is demonstrated.      No acute process identified.    Radiation dose reduction techniques were utilized, including automated exposure control and exposure modulation based on body size.  This report was finalized on 6/15/2023 10:41 AM by Dr. Issac Rodriguez M.D.      MRI Brain With & Without Contrast    Result Date: 6/15/2023  BRAIN MRI WITH AND WITHOUT CONTRAST  HISTORY: Dizziness, arrhythmia or new vasoactive medication; K70.10-Alcoholic hepatitis without ascites; K85.90-Acute pancreatitis without necrosis or infection, unspecified; K86.1-Other chronic pancreatitis  COMPARISON: 06/14/2023.  FINDINGS:  Multiplanar images of the head were obtained without and with gadolinium. No areas of  restricted diffusion are seen to suggest acute infarct. Ventricles are normal in size. There is no midline shift or mass effect. There is really no significant microangiopathic change. The intracranial flow voids appear intact. No abnormality is seen on susceptibility weighted imaging. Postcontrast imaging does not demonstrate any abnormal enhancement. There is no evidence of venous occlusion. There is mucosal thickening noted within the ethmoid sinuses.      1. No acute intracranial abnormality.   No abnormal enhancement.  This report was finalized on 6/15/2023 10:17 PM by Dr. Lise Almanza M.D.      CT Abdomen Pelvis With Contrast    Result Date: 6/14/2023  EXAMINATION: CT OF THE ABDOMEN AND PELVIS WITH CONTRAST  TECHNIQUE: Computed tomography of the abdomen and pelvis after the uneventful administration of nonionic intravenous contrast per protocol. Radiation dose reduction techniques were utilized, including automated exposure control and exposure modulation based on body size.  HISTORY: Nausea, vomiting meeting, and jaundice  COMPARISON: 04/25/2022  FINDINGS: Limited evaluation of the inferior thorax demonstrates no consolidation, pleural effusion, or pneumothorax. The heart is normal in size and configuration, without pericardial effusion.  There is hepatic steatosis with mild focal sparing near the gallbladder fossa. There is interval near resolution of peripancreatic stranding with mild residual pancreatic thickening along the body and with heterogeneous appearance of the pancreatic parenchyma. The gallbladder is distended. No biliary ductal dilation is seen. There is mild gastric distention. The urinary bladder is distended.  The spleen, adrenal glands, kidneys, small bowel, large bowel, and abdominal vasculature are normal. No intraperitoneal fluid collection or free gas are seen. No enlarged lymph nodes are demonstrated.  Bone windows demonstrate degenerative changes, without suspicious osseous lesion  seen.      1. Improved findings of pancreatitis, with possible residual or recurrent pancreatitis demonstrated 2. Hepatic steatosis 3. Additional incidental findings as above.  This report was finalized on 6/14/2023 10:57 AM by Dr. Giacomo aJin M.D.      XR Chest 1 View    Result Date: 6/14/2023  PORTABLE CHEST X-RAY  HISTORY: Chest pain with dizziness, jaundice, and multiple episodes of vomiting overnight..  Portable chest x-ray is provided. Correlation: Chest x-ray 08/27/2014.  FINDINGS: The cardiomediastinal silhouette is normal. The lungs are clear. The costophrenic sulci are dry and the bones appear normal. There is no pneumothorax. No pneumomediastinum.      Negative.  This report was finalized on 6/14/2023 10:42 AM by Dr. Jonatan Wheat M.D.         Impression: Mr. Gunderson is a 38-year-old male with past medical history of alcohol dependence, ADHD, PTSD, bipolar I, pancreatitis, alcoholic hepatitis who presented to [facility] with complaints of dizziness and balance issues that were provoked by turning his head or changing position.  He reports that he is a daily drinker and usually drinks a pint of liquor per day, however his last drink was a few days prior to presentation to ED.  He also reported abdominal pain, nausea and vomiting which have improved.  On exam yesterday, general neurologist noted akathisias and chloroform movements with hallucinations however these were not noted on my exam today. Patient denies hallucinations. MRI brain w/wo showed no acute intracranial abnormality or abnormal enhancement. EEG performed, showed no potentially epileptogenic activity, seizure activity, or focal slowing.  Labs review vitamin B12 1505, folate <20, TSH 0.858, ammonia 43, ceruloplasmin 15, copper still pending.  Patient was not orthostatic per reviewed blood pressures.  Patient symptoms likely secondary to BPPV.  No focal symptoms observed or reported, neuroimaging negative for stroke.  He states that the  dizziness and balance issues have resolved. We recommend outpatient vestibular therapy.  Also recommend absolute alcohol cessation.  Patient is neurologically stable, neurology signing off.  Patient does not need to follow-up in outpatient clinic.  Call RRT for any acute neurologic change or concern.    Plan:  Copper pending  Alcohol cessation  Vestibular therapy outpatient-we will set this up for patient      I have discussed the above with the patient and family and Dr. Tam who are in agreement with the plan.     JAMAL Hoang  [facility]   06/16/23  11:00 EDT    Diagnoses:    Alcoholic hepatitis, unspecified whether ascites present    Anxiety    Bipolar I disorder

## 2023-06-16 NOTE — CONSULTS
Requesting Provider:  Dr. Torres  Reason for Consult: Alcohol abuse, history of ADHD and bipolar disorder.    Date of admission: June 14, 2023  Date of assessment: June 16, 2023    Chief Complaint: None given    History of presenting illness: Patient is a 38 y.o. male with a reported past psychiatric history of bipolar disorder seen on consultation for the above-stated reason.  The patient also has a history of autistic spectrum disorder.  Patient previously been seen on consultation by Dr. Angelo April 29, 2022.  Please see this note.  The patient is a somewhat poor historian.  The patient had presented to the ED due to vomiting, diarrhea and weakness.  He reported that he had been drinking 1 pint of alcohol daily for the previous month.  This was following a 45-day period of sobriety during which time he was in a rehab facility.  The patient has no history of previous inpatient psychiatric treatment.  He has no history of past suicide attempts.  His outpatient provider is Gudelia Lazar.  He denies any acute suicidal patient, homicidal ideations and homestudy audiovisual hallucinations.  He denies any current alcohol withdrawal symptoms.      Past psychiatric history: See HPI    Past medical history:  Diagnoses: Alcoholic hepatitis  Medications:     Current Facility-Administered Medications   Medication Dose Route Frequency Provider Last Rate Last Admin    sennosides-docusate (PERICOLACE) 8.6-50 MG per tablet 2 tablet  2 tablet Oral BID Maliha Cardoza MD   2 tablet at 06/16/23 0824    And    polyethylene glycol (MIRALAX) packet 17 g  17 g Oral Daily PRN Maliha Cardoza MD        And    bisacodyl (DULCOLAX) EC tablet 5 mg  5 mg Oral Daily PRN Maliha Cardoza MD        And    bisacodyl (DULCOLAX) suppository 10 mg  10 mg Rectal Daily PRN Maliha Cardoza MD        busPIRone (BUSPAR) tablet 10 mg  10 mg Oral TID Maliha Cardoza MD   10 mg at 06/16/23 0825    Calcium  Replacement - Follow Nurse / BPA Driven Protocol   Does not apply PRN Maliha Cardoza MD        chlordiazePOXIDE (LIBRIUM) capsule 25 mg  25 mg Oral TID Alfredo Torres MD   25 mg at 06/16/23 0825    cloNIDine (CATAPRES) tablet 0.1 mg  0.1 mg Oral Q4H PRN Maliha Cardoza MD   0.1 mg at 06/15/23 1447    folic acid (FOLVITE) tablet 1 mg  1 mg Oral Daily StingMaliha solorzano MD   1 mg at 06/16/23 0824    hydrOXYzine (ATARAX) tablet 25 mg  25 mg Oral TID PRN Jacqueline Lerma APRN   25 mg at 06/16/23 1001    Magnesium Standard Dose Replacement - Follow Nurse / BPA Driven Protocol   Does not apply PRN Maliha Cardoza MD        Magnesium Standard Dose Replacement - Follow Nurse / BPA Driven Protocol   Does not apply PRN Maliha Cardoza MD        melatonin tablet 3 mg  3 mg Oral Nightly PRN Maliha Cardoza MD   3 mg at 06/16/23 0009    metoclopramide (REGLAN) injection 10 mg  10 mg Intravenous Q6H PRN Maliha Cardoza MD        mirtazapine (REMERON) tablet 15 mg  15 mg Oral Nightly Maliha Cardoza MD   15 mg at 06/15/23 2101    morphine injection 2 mg  2 mg Intravenous Q4H PRN Maliha Cardoza MD   2 mg at 06/15/23 0630    ondansetron (ZOFRAN) tablet 4 mg  4 mg Oral Q6H PRN Maliha Cardoza MD        Or    ondansetron (ZOFRAN) injection 4 mg  4 mg Intravenous Q6H PRN Maliha Cardoza MD   4 mg at 06/14/23 1828    oxyCODONE (ROXICODONE) immediate release tablet 5 mg  5 mg Oral Q6H PRN Alfredo Torres MD   5 mg at 06/16/23 1001    pantoprazole (PROTONIX) EC tablet 40 mg  40 mg Oral BID AC Alfredo Torres MD   40 mg at 06/16/23 0738    Phosphorus Replacement - Follow Nurse / BPA Driven Protocol   Does not apply PRN Alfredo Torres MD        Potassium Replacement - Follow Nurse / BPA Driven Protocol   Does not apply PRN Maliha Cardoza MD        propranolol (INDERAL) tablet 20 mg  20 mg Oral TID Maliha Cardoza MD   20  mg at 06/16/23 0824    sodium chloride 0.9 % with KCl 20 mEq/L infusion  100 mL/hr Intravenous Continuous Maliha Cardoza  mL/hr at 06/16/23 1211 100 mL/hr at 06/16/23 1211    thiamine (B-1) injection 200 mg  200 mg Intravenous Q8H Maliha Cardoza MD   200 mg at 06/16/23 1343    Followed by    [START ON 6/20/2023] thiamine (VITAMIN B-1) tablet 100 mg  100 mg Oral Daily Maliha Cardoza MD        venlafaxine XR (EFFEXOR-XR) 24 hr capsule 225 mg  225 mg Oral Daily Maliha Cardoza MD   225 mg at 06/16/23 0825      Medication allergies:      Social history: Noncontributory    Family history: Noncontributory    Substance abuse history: None    Vital Signs    Temp:  97.6  Heart Rate: 88   Resp:  18  BP:  132/92    Mental Status Exam: The patient is found l sitting up in bed.  He is awake and alert.  He is casually dressed and displays fair grooming and hygiene alert and oriented times.  Speech is.  Mood is.  Affect congruent.  She denies any acute suicidal ideation, homicidal ideations or audiovisual hallucinations.  Thought processes are circumstantial.  Judgment and are okay.  Memory is intact.    Assessment:   Alcohol dependence;  Depressive disorder, not otherwise specified    Treatment Plan:     Continue home psychiatric medications.  I recommend the patient role into an intensive outpatient program upon completion of inpatient care.  He may then follow up with his outpatient provider.    Thank you for this consultation.  Please contact Access for any additional requests.  Psychiatry will sign off.

## 2023-06-16 NOTE — PLAN OF CARE
Goal Outcome Evaluation:  Plan of Care Reviewed With: patient      Patient rested some at night.Very anxious and restless at times.Medicated per MAR.Medicated for rib cage pain.Continues with IVFs.SR

## 2023-06-16 NOTE — PROGRESS NOTES
[facility]  Inpatient Progress Note    Reason for Follow Up: Alcoholic hepatitis    Subjective     Interval History:     This morning he reports feeling well other than feeling anxious.  He is eating regular food without difficulty.  Denies abdominal pain, nausea, vomiting, diarrhea or rectal bleeding.    LFTs and bilirubin elevated but continues to improve.      DF remains low at 8.9      Current Facility-Administered Medications:     sennosides-docusate (PERICOLACE) 8.6-50 MG per tablet 2 tablet, 2 tablet, Oral, BID, 2 tablet at 06/16/23 0824 **AND** polyethylene glycol (MIRALAX) packet 17 g, 17 g, Oral, Daily PRN **AND** bisacodyl (DULCOLAX) EC tablet 5 mg, 5 mg, Oral, Daily PRN **AND** bisacodyl (DULCOLAX) suppository 10 mg, 10 mg, Rectal, Daily PRN, Maliha Cardoza MD    busPIRone (BUSPAR) tablet 10 mg, 10 mg, Oral, TID, Maliha Cardoza MD, 10 mg at 06/16/23 0825    Calcium Replacement - Follow Nurse / BPA Driven Protocol, , Does not apply, Sony BALTAZAR Renate Andrea, MD    chlordiazePOXIDE (LIBRIUM) capsule 25 mg, 25 mg, Oral, TID, Alfredo Torres MD, 25 mg at 06/16/23 0825    cloNIDine (CATAPRES) tablet 0.1 mg, 0.1 mg, Oral, Q4H PRN, Maliha Cardoza MD, 0.1 mg at 06/15/23 1447    folic acid (FOLVITE) tablet 1 mg, 1 mg, Oral, Daily, Maliha Cardoza MD, 1 mg at 06/16/23 0824    hydrOXYzine (ATARAX) tablet 25 mg, 25 mg, Oral, TID PRN, Jacqueline Lerma APRN, 25 mg at 06/16/23 1001    Magnesium Standard Dose Replacement - Follow Nurse / BPA Driven Protocol, , Does not apply, Sony BALTAZAR Renate Andrea, MD    Magnesium Standard Dose Replacement - Follow Nurse / BPA Driven Protocol, , Does not apply, Sony BALTAZAR Renate Andrea, MD    melatonin tablet 3 mg, 3 mg, Oral, Nightly PRN, Maliha Cardoza MD, 3 mg at 06/16/23 0009    metoclopramide (REGLAN) injection 10 mg, 10 mg, Intravenous, Q6H PRN, Maliha Cardoza MD    mirtazapine (REMERON)  tablet 15 mg, 15 mg, Oral, Nightly, Maliha Cardoza MD, 15 mg at 06/15/23 2101    morphine injection 2 mg, 2 mg, Intravenous, Q4H PRN, Maliha Cardoza MD, 2 mg at 06/15/23 0630    ondansetron (ZOFRAN) tablet 4 mg, 4 mg, Oral, Q6H PRN **OR** ondansetron (ZOFRAN) injection 4 mg, 4 mg, Intravenous, Q6H PRN, Maliha Cardoza MD, 4 mg at 06/14/23 1828    oxyCODONE (ROXICODONE) immediate release tablet 5 mg, 5 mg, Oral, Q6H PRN, Alfredo Torres MD, 5 mg at 06/16/23 1001    pantoprazole (PROTONIX) EC tablet 40 mg, 40 mg, Oral, BID AC, Alfredo Torres MD, 40 mg at 06/16/23 0738    Phosphorus Replacement - Follow Nurse / BPA Driven Protocol, , Does not apply, PRN, Alfredo Torres MD    Potassium Replacement - Follow Nurse / BPA Driven Protocol, , Does not apply, Sony BALTAZAR Renate Andrea, MD    propranolol (INDERAL) tablet 20 mg, 20 mg, Oral, TID, Maliha Cardoza MD, 20 mg at 06/16/23 0824    sodium chloride 0.9 % with KCl 20 mEq/L infusion, 100 mL/hr, Intravenous, Continuous, Maliha Cardoza MD, Last Rate: 100 mL/hr at 06/16/23 0206, 100 mL/hr at 06/16/23 0206    thiamine (B-1) injection 200 mg, 200 mg, Intravenous, Q8H, 200 mg at 06/16/23 0738 **FOLLOWED BY** [START ON 6/20/2023] thiamine (VITAMIN B-1) tablet 100 mg, 100 mg, Oral, Daily, Maliha Cardoza MD    venlafaxine XR (EFFEXOR-XR) 24 hr capsule 225 mg, 225 mg, Oral, Daily, Maliha Cardoza MD, 225 mg at 06/16/23 0825  Review of Systems:    The following systems were reviewed and negative;  gastrointestinal    Objective     Vital Signs  Temp:  [97.6 °F (36.4 °C)-98.6 °F (37 °C)] 97.6 °F (36.4 °C)  Heart Rate:  [79-95] 87  Resp:  [16-18] 18  BP: (123-149)/() 129/88  Body mass index is 27.97 kg/m².  No intake or output data in the 24 hours ending 06/16/23 1022  No intake/output data recorded.     Physical Exam:   General: patient awake, alert and cooperative   Abdomen: soft, nontender, nondistended;  normal bowel sounds      Results Review:     I reviewed the patient's new clinical results.    Results from last 7 days   Lab Units 06/16/23  0641 06/15/23  0538 06/14/23  0955   WBC 10*3/mm3 3.96 5.74 6.81   HEMOGLOBIN g/dL 12.0* 13.9 15.8   HEMATOCRIT % 35.1* 39.1 44.3   PLATELETS 10*3/mm3 123* 119* 144     Results from last 7 days   Lab Units 06/16/23  0641 06/15/23  0538 06/14/23  0955   SODIUM mmol/L 136 133* 126*   POTASSIUM mmol/L 4.1 3.7 3.4*   CHLORIDE mmol/L 96* 95* 85*   CO2 mmol/L 29.1* 29.0 28.7   BUN mg/dL 6 8 19   CREATININE mg/dL 0.85 0.98 0.77   CALCIUM mg/dL 9.3 8.6 9.9   BILIRUBIN mg/dL 6.1* 9.0* 12.6*   ALK PHOS U/L 221* 238* 300*   ALT (SGPT) U/L 279* 343* 451*   AST (SGOT) U/L 262* 422* 566*   GLUCOSE mg/dL 104* 98 148*     Results from last 7 days   Lab Units 06/16/23  0641 06/15/23  0744   INR  1.03 1.04     Lab Results   Lab Value Date/Time    LIPASE 78 (H) 06/14/2023 0955    LIPASE 789 (H) 04/25/2022 0158    LIPASE 852 (H) 02/09/2022 1618    LIPASE 136 (H) 09/10/2021 1052    LIPASE 20 02/17/2021 1545    LIPASE 16 02/19/2019 1529    LIPASE 30 05/25/2018 1519    LIPASE 163 (H) 08/26/2014 0623    LIPASE 649 (H) 08/25/2014 0612    LIPASE 1,764 (H) 08/24/2014 0445    LIPASE 2,992 (H) 08/23/2014 0428    LIPASE 1,271 (H) 08/22/2014 1340       Radiology:  MRI Brain With & Without Contrast   Final Result   1. No acute intracranial abnormality.   No abnormal enhancement.       This report was finalized on 6/15/2023 10:17 PM by Dr. Lise Almanza M.D.          CT Abdomen Pelvis With Contrast   Final Result   1. Improved findings of pancreatitis, with possible residual or   recurrent pancreatitis demonstrated   2. Hepatic steatosis   3. Additional incidental findings as above.       This report was finalized on 6/14/2023 10:57 AM by Dr. Giacomo Jain M.D.          CT Head Without Contrast   Final Result   No acute process identified.               Radiation dose reduction techniques were  utilized, including automated   exposure control and exposure modulation based on body size.       This report was finalized on 6/15/2023 10:41 AM by Dr. Issac Rodriguez M.D.          XR Chest 1 View   Final Result   Negative.       This report was finalized on 6/14/2023 10:42 AM by Dr. Jonatan Wheat M.D.              Assessment & Plan     Active Hospital Problems    Diagnosis     **Alcoholic hepatitis, unspecified whether ascites present     Anxiety     Bipolar I disorder        Assessment:  Alcoholic hepatitis  Alcoholism  Hyperbilirubinemia with elevated liver function tests  Abnormal CT findings of improved pancreatitis  Hepatic steatosis    Plan:    Patient is without GI complaints at the moment. Physical examination is essentially unremarkable aside from jaundice which is improving. Recommend continuing to monitor liver function test. Discriminant function 8.9 no indication for steroids at the moment. Continue regular diet. Continue CIWA protocol per primary team. Recommend absolute alcohol cessation w/ outpatient treatment program upon discharge.     I discussed the patients findings and my recommendations with patient.    JAMAL Covarrubias

## 2023-06-16 NOTE — NURSING NOTE
"  Access center follow up.    Patient sitting up in bed, in street clothes watching tv. He is alert and oriented x4. Per overnight RN pt. very anxious and at times restless. Pt. denies any current sleep issues. Pt. discussed significant psychiatric history. He discussed Hx. of bipolar 1, and per chart anxiety, PTSD, ADHD, insomnia, alcoholic hepatitis. He reports being current with outpatient psychiatry approx. 2 years for counseling and medication management and plans continuing with current provider. He reports recent flare up of A/H but having experienced for years. He reports last A/H yesterday and described as \"white noise or music.\" Patient denies any command hallucinations. He reports Hx. of V/H, stating \"I find faces in patterns.\" Regarding hallucinations pt. states I know they're not real. I can manipulate them like a fun game I find amusing.\" He reports recently \"my brain has been a bit challenging due to vertigo.\" Throughout encounter patient is pleasant, cooperative,restless, rambling at times and requiring re-direction. Last CIWA 3. Pt. discussed recent substance use treatment and past AA and having the knowledge on recovery just needing to apply himself. He accepted recovery resources, including support groups/outpatient rehab. Offered outpatient psychiatry options. Pt. interested in [facility] mental health IOP and accepted resource information. Pt. requesting an anti-psychotic medication. Dr. Espino consulted for medication management. Access following.   "

## 2023-06-17 LAB
ALBUMIN SERPL-MCNC: 3.4 G/DL (ref 3.5–5.2)
ALBUMIN/GLOB SERPL: 1.3 G/DL
ALP SERPL-CCNC: 188 U/L (ref 39–117)
ALT SERPL W P-5'-P-CCNC: 222 U/L (ref 1–41)
ANION GAP SERPL CALCULATED.3IONS-SCNC: 10.4 MMOL/L (ref 5–15)
AST SERPL-CCNC: 165 U/L (ref 1–40)
BILIRUB SERPL-MCNC: 3.5 MG/DL (ref 0–1.2)
BUN SERPL-MCNC: 7 MG/DL (ref 6–20)
BUN/CREAT SERPL: 9.6 (ref 7–25)
CALCIUM SPEC-SCNC: 9.1 MG/DL (ref 8.6–10.5)
CHLORIDE SERPL-SCNC: 101 MMOL/L (ref 98–107)
CO2 SERPL-SCNC: 28.6 MMOL/L (ref 22–29)
CREAT SERPL-MCNC: 0.73 MG/DL (ref 0.76–1.27)
DEPRECATED RDW RBC AUTO: 42.2 FL (ref 37–54)
EGFRCR SERPLBLD CKD-EPI 2021: 120.2 ML/MIN/1.73
ERYTHROCYTE [DISTWIDTH] IN BLOOD BY AUTOMATED COUNT: 13.1 % (ref 12.3–15.4)
GLOBULIN UR ELPH-MCNC: 2.7 GM/DL
GLUCOSE SERPL-MCNC: 138 MG/DL (ref 65–99)
HCT VFR BLD AUTO: 32.7 % (ref 37.5–51)
HGB BLD-MCNC: 11.2 G/DL (ref 13–17.7)
MCH RBC QN AUTO: 30.9 PG (ref 26.6–33)
MCHC RBC AUTO-ENTMCNC: 34.3 G/DL (ref 31.5–35.7)
MCV RBC AUTO: 90.1 FL (ref 79–97)
PLATELET # BLD AUTO: 136 10*3/MM3 (ref 140–450)
PMV BLD AUTO: 9.3 FL (ref 6–12)
POTASSIUM SERPL-SCNC: 3.9 MMOL/L (ref 3.5–5.2)
PROT SERPL-MCNC: 6.1 G/DL (ref 6–8.5)
RBC # BLD AUTO: 3.63 10*6/MM3 (ref 4.14–5.8)
SODIUM SERPL-SCNC: 140 MMOL/L (ref 136–145)
WBC NRBC COR # BLD: 4.08 10*3/MM3 (ref 3.4–10.8)

## 2023-06-17 PROCEDURE — 85027 COMPLETE CBC AUTOMATED: CPT | Performed by: STUDENT IN AN ORGANIZED HEALTH CARE EDUCATION/TRAINING PROGRAM

## 2023-06-17 PROCEDURE — 96376 TX/PRO/DX INJ SAME DRUG ADON: CPT

## 2023-06-17 PROCEDURE — 25010000002 THIAMINE HCL 200 MG/2ML SOLUTION: Performed by: INTERNAL MEDICINE

## 2023-06-17 PROCEDURE — 80053 COMPREHEN METABOLIC PANEL: CPT | Performed by: STUDENT IN AN ORGANIZED HEALTH CARE EDUCATION/TRAINING PROGRAM

## 2023-06-17 PROCEDURE — 99232 SBSQ HOSP IP/OBS MODERATE 35: CPT | Performed by: INTERNAL MEDICINE

## 2023-06-17 RX ORDER — CHLORDIAZEPOXIDE HYDROCHLORIDE 25 MG/1
25 CAPSULE, GELATIN COATED ORAL 2 TIMES DAILY
Status: DISCONTINUED | OUTPATIENT
Start: 2023-06-17 | End: 2023-06-18

## 2023-06-17 RX ADMIN — THIAMINE HYDROCHLORIDE 200 MG: 100 INJECTION, SOLUTION INTRAMUSCULAR; INTRAVENOUS at 15:21

## 2023-06-17 RX ADMIN — BUSPIRONE HYDROCHLORIDE 10 MG: 10 TABLET ORAL at 15:21

## 2023-06-17 RX ADMIN — OXYCODONE HYDROCHLORIDE 5 MG: 5 TABLET ORAL at 21:15

## 2023-06-17 RX ADMIN — CHLORDIAZEPOXIDE HYDROCHLORIDE 25 MG: 25 CAPSULE ORAL at 21:08

## 2023-06-17 RX ADMIN — BUSPIRONE HYDROCHLORIDE 10 MG: 10 TABLET ORAL at 21:07

## 2023-06-17 RX ADMIN — PROPRANOLOL HYDROCHLORIDE 20 MG: 20 TABLET ORAL at 15:21

## 2023-06-17 RX ADMIN — PANTOPRAZOLE SODIUM 40 MG: 40 TABLET, DELAYED RELEASE ORAL at 17:17

## 2023-06-17 RX ADMIN — HYDROXYZINE HYDROCHLORIDE 25 MG: 25 TABLET ORAL at 15:21

## 2023-06-17 RX ADMIN — MIRTAZAPINE 15 MG: 15 TABLET, FILM COATED ORAL at 21:07

## 2023-06-17 RX ADMIN — PANTOPRAZOLE SODIUM 40 MG: 40 TABLET, DELAYED RELEASE ORAL at 08:56

## 2023-06-17 RX ADMIN — PROPRANOLOL HYDROCHLORIDE 20 MG: 20 TABLET ORAL at 08:56

## 2023-06-17 RX ADMIN — THIAMINE HYDROCHLORIDE 200 MG: 100 INJECTION, SOLUTION INTRAMUSCULAR; INTRAVENOUS at 21:08

## 2023-06-17 RX ADMIN — Medication 3 MG: at 21:08

## 2023-06-17 RX ADMIN — VENLAFAXINE HYDROCHLORIDE 225 MG: 150 CAPSULE, EXTENDED RELEASE ORAL at 08:56

## 2023-06-17 RX ADMIN — CHLORDIAZEPOXIDE HYDROCHLORIDE 25 MG: 25 CAPSULE ORAL at 08:57

## 2023-06-17 RX ADMIN — PROPRANOLOL HYDROCHLORIDE 20 MG: 20 TABLET ORAL at 21:08

## 2023-06-17 RX ADMIN — THIAMINE HYDROCHLORIDE 200 MG: 100 INJECTION, SOLUTION INTRAMUSCULAR; INTRAVENOUS at 06:34

## 2023-06-17 RX ADMIN — BUSPIRONE HYDROCHLORIDE 10 MG: 10 TABLET ORAL at 08:55

## 2023-06-17 RX ADMIN — Medication 1 MG: at 08:56

## 2023-06-17 NOTE — PLAN OF CARE
Goal Outcome Evaluation:  Plan of Care Reviewed With: patient      Patient reports feeling better this shift.Resting quietly in bed.Very anxious at times Medicated per MAR.No N/V.Medicated for pain once.No acute changes.

## 2023-06-17 NOTE — PROGRESS NOTES
[facility]  Inpatient Progress Note    Reason for Followup:  EtOH hepatitis    Subjective     Interval History:   No new issues    Current Facility-Administered Medications:     sennosides-docusate (PERICOLACE) 8.6-50 MG per tablet 2 tablet, 2 tablet, Oral, BID, 2 tablet at 06/16/23 2028 **AND** polyethylene glycol (MIRALAX) packet 17 g, 17 g, Oral, Daily PRN **AND** bisacodyl (DULCOLAX) EC tablet 5 mg, 5 mg, Oral, Daily PRN **AND** bisacodyl (DULCOLAX) suppository 10 mg, 10 mg, Rectal, Daily PRN, Maliha Cardoza MD    busPIRone (BUSPAR) tablet 10 mg, 10 mg, Oral, TID, Maliha Cardoza MD, 10 mg at 06/16/23 2029    Calcium Replacement - Follow Nurse / BPA Driven Protocol, , Does not apply, Sony BALTAZAR Renate Andrea, MD    chlordiazePOXIDE (LIBRIUM) capsule 25 mg, 25 mg, Oral, TID, Alfredo Torres MD, 25 mg at 06/16/23 2028    cloNIDine (CATAPRES) tablet 0.1 mg, 0.1 mg, Oral, Q4H PRN, Maliha Cardoza MD, 0.1 mg at 06/15/23 1447    folic acid (FOLVITE) tablet 1 mg, 1 mg, Oral, Daily, StingMaliha solorzano MD, 1 mg at 06/16/23 0824    hydrOXYzine (ATARAX) tablet 25 mg, 25 mg, Oral, TID PRN, Jacqueline Lerma APRN, 25 mg at 06/16/23 1001    Magnesium Standard Dose Replacement - Follow Nurse / BPA Driven Protocol, , Does not apply, Sony BALTAZAR Renate Andrea, MD    Magnesium Standard Dose Replacement - Follow Nurse / BPA Driven Protocol, , Does not apply, Sony BALTAZAR Renate Andrea, MD    melatonin tablet 3 mg, 3 mg, Oral, Nightly PRN, Maliha Cardoza MD, 3 mg at 06/16/23 2313    metoclopramide (REGLAN) injection 10 mg, 10 mg, Intravenous, Q6H PRN, Maliha Cardoza MD    mirtazapine (REMERON) tablet 15 mg, 15 mg, Oral, Nightly, Maliha Cardoza MD, 15 mg at 06/16/23 2029    morphine injection 2 mg, 2 mg, Intravenous, Q4H PRN, Maliha Cardoza MD, 2 mg at 06/15/23 0630    ondansetron (ZOFRAN) tablet 4 mg, 4 mg, Oral, Q6H PRN **OR**  ondansetron (ZOFRAN) injection 4 mg, 4 mg, Intravenous, Q6H PRN, Maliha Cardoza MD, 4 mg at 06/14/23 1828    oxyCODONE (ROXICODONE) immediate release tablet 5 mg, 5 mg, Oral, Q6H PRN, Alfredo Torres MD, 5 mg at 06/16/23 2029    pantoprazole (PROTONIX) EC tablet 40 mg, 40 mg, Oral, BID AC, Alfredo Torres MD, 40 mg at 06/16/23 1640    Phosphorus Replacement - Follow Nurse / BPA Driven Protocol, , Does not apply, PRN, Alfredo Torres MD    Potassium Replacement - Follow Nurse / BPA Driven Protocol, , Does not apply, Sony BALTAZAR Renate Andrea, MD    propranolol (INDERAL) tablet 20 mg, 20 mg, Oral, TID, Maliha Cardoza MD, 20 mg at 06/16/23 2029    thiamine (B-1) injection 200 mg, 200 mg, Intravenous, Q8H, 200 mg at 06/17/23 0634 **FOLLOWED BY** [START ON 6/20/2023] thiamine (VITAMIN B-1) tablet 100 mg, 100 mg, Oral, Daily, Maliha Cardoza MD    venlafaxine XR (EFFEXOR-XR) 24 hr capsule 225 mg, 225 mg, Oral, Daily, Maliha Cardoza MD, 225 mg at 06/16/23 0825    Objective     Vital Signs  Temp:  [97.5 °F (36.4 °C)-97.8 °F (36.6 °C)] 97.8 °F (36.6 °C)  Heart Rate:  [76-94] 76  Resp:  [18] 18  BP: (115-132)/(75-92) 115/75  Body mass index is 26.02 kg/m².    Intake/Output Summary (Last 24 hours) at 6/17/2023 0703  Last data filed at 6/16/2023 2023  Gross per 24 hour   Intake 2570 ml   Output --   Net 2570 ml     No intake/output data recorded.     Physical Exam:   General: patient awake, alert and cooperative   Abdomen: soft, nontender, nondistended; normal bowel sounds     Results Review:     I reviewed the patient's new clinical results.    Results from last 7 days   Lab Units 06/16/23  0641 06/15/23  0538 06/14/23  0955   WBC 10*3/mm3 3.96 5.74 6.81   HEMOGLOBIN g/dL 12.0* 13.9 15.8   HEMATOCRIT % 35.1* 39.1 44.3   PLATELETS 10*3/mm3 123* 119* 144     Results from last 7 days   Lab Units 06/16/23  0641 06/15/23  0538 06/14/23  0955   SODIUM mmol/L 136 133* 126*   POTASSIUM  mmol/L 4.1 3.7 3.4*   CHLORIDE mmol/L 96* 95* 85*   CO2 mmol/L 29.1* 29.0 28.7   BUN mg/dL 6 8 19   CREATININE mg/dL 0.85 0.98 0.77   CALCIUM mg/dL 9.3 8.6 9.9   BILIRUBIN mg/dL 6.1* 9.0* 12.6*   ALK PHOS U/L 221* 238* 300*   ALT (SGPT) U/L 279* 343* 451*   AST (SGOT) U/L 262* 422* 566*   GLUCOSE mg/dL 104* 98 148*     Results from last 7 days   Lab Units 06/16/23  0641 06/15/23  0744   INR  1.03 1.04     Lab Results   Lab Value Date/Time    LIPASE 78 (H) 06/14/2023 0955    LIPASE 789 (H) 04/25/2022 0158    LIPASE 852 (H) 02/09/2022 1618    LIPASE 136 (H) 09/10/2021 1052    LIPASE 20 02/17/2021 1545    LIPASE 16 02/19/2019 1529    LIPASE 30 05/25/2018 1519    LIPASE 163 (H) 08/26/2014 0623    LIPASE 649 (H) 08/25/2014 0612    LIPASE 1,764 (H) 08/24/2014 0445    LIPASE 2,992 (H) 08/23/2014 0428    LIPASE 1,271 (H) 08/22/2014 1340       Radiology:  [unfilled]      Assessment & Plan   Assessment:    EtOH hepatitis with low DF   EtOH pancreatitis    All problems new to me today    Plan:   His LFTs have been improving, his DF is low and there is no indication for steroids at this time  He has mild uncomplicated pancreatitis as well and is tolerating diet    He needs complete EtOH abstinence going forward.      GI will sign off    I discussed the patient's findings and my recommendations with patient.          Raj Enriquez M.D.  [facility]  [address] [place] [address]  Office: [phone]

## 2023-06-17 NOTE — PROGRESS NOTES
Name: Mane Gunderson ADMIT: 2023   : 1985  PCP: Dominique Ontiveros APRN    MRN: [id number] LOS: 3 days   AGE/SEX: 38 y.o. male  ROOM: [facility]     Subjective   Subjective   No acute events overnight.  Continues to feel better.  Dizziness essentially resolved per patient.  Denies abdominal pain.    Review of Systems   As above  Objective   Objective   Vital Signs  Temp:  [97.5 °F (36.4 °C)-98.1 °F (36.7 °C)] 98.1 °F (36.7 °C)  Heart Rate:  [] 105  Resp:  [18] 18  BP: (115-132)/(73-92) 121/73  SpO2:  [92 %] 92 %  on   ;   Device (Oxygen Therapy): room air  Body mass index is 26.02 kg/m².  Physical Exam      General: Alert, no acute distress, cooperative  HEENT: Normocephalic, atraumatic  CV: Regular rate and rhythm, no murmurs rubs or gallops  Lungs: Clear to auscultation bilaterally, no crackles or wheezes  Abdomen: Soft, nontender, nondistended  Extremities: No significant peripheral edema , no cyanosis       Results Review     I reviewed the patient's new clinical results.  Results from last 7 days   Lab Units 23  0646 23  0641 06/15/23  0538 23  0955   WBC 10*3/mm3 4.08 3.96 5.74 6.81   HEMOGLOBIN g/dL 11.2* 12.0* 13.9 15.8   PLATELETS 10*3/mm3 136* 123* 119* 144       Results from last 7 days   Lab Units 23  0646 23  0641 06/15/23  0538 23  0955   SODIUM mmol/L 140 136 133* 126*   POTASSIUM mmol/L 3.9 4.1 3.7 3.4*   CHLORIDE mmol/L 101 96* 95* 85*   CO2 mmol/L 28.6 29.1* 29.0 28.7   BUN mg/dL 7 6 8 19   CREATININE mg/dL 0.73* 0.85 0.98 0.77   GLUCOSE mg/dL 138* 104* 98 148*     Estimated Creatinine Clearance: 156.6 mL/min (A) (by C-G formula based on SCr of 0.73 mg/dL (L)).  Results from last 7 days   Lab Units 23  0646 23  0641 06/15/23  0538 23  0955   ALBUMIN g/dL 3.4* 3.7 4.0 4.3   BILIRUBIN mg/dL 3.5* 6.1* 9.0* 12.6*   ALK PHOS U/L 188* 221* 238* 300*   AST (SGOT) U/L 165* 262* 422* 566*   ALT (SGPT) U/L 222* 279* 343* 451*       Results  from last 7 days   Lab Units 06/17/23  0646 06/16/23  0641 06/15/23  0538 06/14/23  0955   CALCIUM mg/dL 9.1 9.3 8.6 9.9   ALBUMIN g/dL 3.4* 3.7 4.0 4.3   MAGNESIUM mg/dL  --  1.9 2.3  --    PHOSPHORUS mg/dL  --  3.5 2.1*  --        Results from last 7 days   Lab Units 06/14/23  0955   LACTATE mmol/L 1.4       COVID19   Date Value Ref Range Status   04/25/2022 Not Detected Not Detected - Ref. Range Final   09/10/2021 Not Detected Not Detected - Ref. Range Final     No results found for: HGBA1C, POCGLU        MRI Brain With & Without Contrast  Narrative: BRAIN MRI WITH AND WITHOUT CONTRAST     HISTORY: Dizziness, arrhythmia or new vasoactive medication;  K70.10-Alcoholic hepatitis without ascites; K85.90-Acute pancreatitis  without necrosis or infection, unspecified; K86.1-Other chronic  pancreatitis     COMPARISON: 06/14/2023.     FINDINGS:  Multiplanar images of the head were obtained without and with  gadolinium. No areas of restricted diffusion are seen to suggest acute  infarct. Ventricles are normal in size. There is no midline shift or  mass effect. There is really no significant microangiopathic change. The  intracranial flow voids appear intact. No abnormality is seen on  susceptibility weighted imaging. Postcontrast imaging does not  demonstrate any abnormal enhancement. There is no evidence of venous  occlusion. There is mucosal thickening noted within the ethmoid sinuses.     Impression: 1. No acute intracranial abnormality.   No abnormal enhancement.     This report was finalized on 6/15/2023 10:17 PM by Dr. Lise Almanza M.D.     EEG  Date of onset: 6/15/2023  Date of offset: 6/15/2023     Indication: 38 year old man with hallucinations.       Technical description:  This is a 21 channel digital EEG recording that   began on 3:23 PM and ended on 3:49 PM.  Electrodes were placed based on   the 10-20 international electrode placement system.       Background:  The EEG recording demonstrates normal  background activity   with mainly alpha frequencies with intermixed theta frequencies.  10 Hz   frequencies are present posteriorly and symmetrically.  The patient enters   drowsiness but no well formed stage 1 and 2 sleep with architecture is   present.  Hyperventilation and photic stimulation were performed with no   additional abnormalities noted.  There are no asymmetries between the two   hemispheres.  No interictal activity is present.     The EKG monitor shows a heart rate that is around 90 beats per minute.     Clinical interpretation:  This routine awake and drowsy EEG is normal.  No   potentially epileptogenic activity, seizure activity, or focal slowing is   present.  Careful clinical correlation is advised.    CT Head Without Contrast  Narrative: CT OF THE BRAIN WITHOUT CONTRAST 06/14/2023     HISTORY: Mental status change. Dizziness.     Axial images were obtained through the brain without intravenous  contrast.     The brain parenchyma and ventricular system appear within normal limits.  No mass lesions, midline shift, intracranial hemorrhage or evidence of  infarction is demonstrated.     Impression: No acute process identified.           Radiation dose reduction techniques were utilized, including automated  exposure control and exposure modulation based on body size.     This report was finalized on 6/15/2023 10:41 AM by Dr. Issac Rodriguez M.D.       Scheduled Medications  busPIRone, 10 mg, Oral, TID  chlordiazePOXIDE, 25 mg, Oral, TID  folic acid, 1 mg, Oral, Daily  mirtazapine, 15 mg, Oral, Nightly  pantoprazole, 40 mg, Oral, BID AC  propranolol, 20 mg, Oral, TID  senna-docusate sodium, 2 tablet, Oral, BID  thiamine (B-1) IV, 200 mg, Intravenous, Q8H   Followed by  [START ON 6/20/2023] thiamine, 100 mg, Oral, Daily  venlafaxine XR, 225 mg, Oral, Daily    Infusions     Diet  Diet: Cardiac Diets; Healthy Heart (2-3 Na+); Texture: Regular Texture (IDDSI 7); Fluid Consistency: Thin (IDDSI 0)    I  have personally reviewed     [x]  Laboratory   []  Microbiology   []  Radiology   []  EKG/Telemetry  []  Cardiology/Vascular   []  Pathology    []  Records       Assessment/Plan     Active Hospital Problems    Diagnosis  POA    **Alcoholic hepatitis, unspecified whether ascites present [K70.10]  Yes    Anxiety [F41.9]  Yes    Bipolar I disorder [F31.9]  Yes      Resolved Hospital Problems   No resolved problems to display.         Patient is a  36 y.o. with a history of alcohol dependence, pancreatitis presents today with complaints of dizziness, abdominal pain , vomiting, diarrhea, and cough.        Alcohol use disorder/alcoholic hepatitis:  -CT abdomen and pelvis 06/14-1. Improved findings of pancreatitis, with possible residual orrecurrent pancreatitis demonstrated2. Hepatic steatosis  -Continue IV fluids, antiemetics, pain management  -LFT/bilirubin s improving  -Access following  -Avoid Tylenol in the setting of elevated LFTs  -History of allergies to Ativan, decrease Librium to 25 twice daily  -CIWA scores remain elevated between 4-7,primarily  elevated secondary to anxiety.  LFTs and bilirubin improving, no indication for steroids per GI.  GI signed off.     Dizziness.  Associated with head movement, concerning for vertigo  -Orthostatics negative  MRI brain with no acute findings  Neurology f followed, signed off.  Dizziness resolved.    Anxiety/ADHD/bipolar/PTSD-continue mirtazapine, venlafaxine, buspirone.  Psychiatry consulted.      SCDs for DVT prophylaxis.  Full code.  Discussed with patient.  Anticipate discharge home with family likely tomorrow if LFTs continues to improve and patient remains stable.      Copied text in this note has been reviewed and is accurate as of 06/17/23.         Dictated utilizing Dragon dictation        Alfredo Torres MD  [facility]  06/17/23  11:27 EDT

## 2023-06-17 NOTE — NURSING NOTE
"Access Center follow-up regarding alcohol use.  Patient up sitting in chair, dressed.  He is pleasant and cooperative.  Does appear anxious, child-like; appears jaundiced.  He denies alcohol withdrawal symptoms at this time; no hallucinations noted.  VSS, last CIWA score 5 after MN.  He reports he is looking into IOP and dual treatment options for KAILA and mental health-he has resources.  He denies SI and HI.  Wants to get better realizes he has to \"take the initiative\".    Access following.      "

## 2023-06-17 NOTE — PLAN OF CARE
Goal Outcome Evaluation:                   Patient alert and oriented. Patient ambulates unit independently. Patient able to identify anxiety and the cause usually to audible stimuli. Atarax administered per MAR. No acute distress noted, will continue to monitor.

## 2023-06-18 ENCOUNTER — READMISSION MANAGEMENT (OUTPATIENT)
Dept: CALL CENTER | Facility: HOSPITAL | Age: 38
End: 2023-06-18
Payer: COMMERCIAL

## 2023-06-18 VITALS
RESPIRATION RATE: 16 BRPM | WEIGHT: 177.69 LBS | HEART RATE: 80 BPM | HEIGHT: 69 IN | BODY MASS INDEX: 26.32 KG/M2 | OXYGEN SATURATION: 94 % | TEMPERATURE: 97.8 F | SYSTOLIC BLOOD PRESSURE: 121 MMHG | DIASTOLIC BLOOD PRESSURE: 98 MMHG

## 2023-06-18 LAB
ALBUMIN SERPL-MCNC: 3.6 G/DL (ref 3.5–5.2)
ALBUMIN/GLOB SERPL: 1.4 G/DL
ALP SERPL-CCNC: 176 U/L (ref 39–117)
ALT SERPL W P-5'-P-CCNC: 205 U/L (ref 1–41)
ANION GAP SERPL CALCULATED.3IONS-SCNC: 7.1 MMOL/L (ref 5–15)
AST SERPL-CCNC: 111 U/L (ref 1–40)
BILIRUB SERPL-MCNC: 2.3 MG/DL (ref 0–1.2)
BUN SERPL-MCNC: 5 MG/DL (ref 6–20)
BUN/CREAT SERPL: 6.8 (ref 7–25)
CALCIUM SPEC-SCNC: 9.5 MG/DL (ref 8.6–10.5)
CHLORIDE SERPL-SCNC: 99 MMOL/L (ref 98–107)
CO2 SERPL-SCNC: 31.9 MMOL/L (ref 22–29)
CREAT SERPL-MCNC: 0.73 MG/DL (ref 0.76–1.27)
EGFRCR SERPLBLD CKD-EPI 2021: 120.2 ML/MIN/1.73
GLOBULIN UR ELPH-MCNC: 2.6 GM/DL
GLUCOSE SERPL-MCNC: 125 MG/DL (ref 65–99)
POTASSIUM SERPL-SCNC: 3.8 MMOL/L (ref 3.5–5.2)
PROT SERPL-MCNC: 6.2 G/DL (ref 6–8.5)
SODIUM SERPL-SCNC: 138 MMOL/L (ref 136–145)

## 2023-06-18 PROCEDURE — 80053 COMPREHEN METABOLIC PANEL: CPT | Performed by: STUDENT IN AN ORGANIZED HEALTH CARE EDUCATION/TRAINING PROGRAM

## 2023-06-18 PROCEDURE — 63710000001 ONDANSETRON PER 8 MG: Performed by: INTERNAL MEDICINE

## 2023-06-18 PROCEDURE — 96376 TX/PRO/DX INJ SAME DRUG ADON: CPT

## 2023-06-18 PROCEDURE — 25010000002 THIAMINE HCL 200 MG/2ML SOLUTION: Performed by: INTERNAL MEDICINE

## 2023-06-18 RX ORDER — CHLORDIAZEPOXIDE HYDROCHLORIDE 25 MG/1
25 CAPSULE, GELATIN COATED ORAL DAILY
Status: DISCONTINUED | OUTPATIENT
Start: 2023-06-19 | End: 2023-06-18 | Stop reason: HOSPADM

## 2023-06-18 RX ORDER — LANOLIN ALCOHOL/MO/W.PET/CERES
100 CREAM (GRAM) TOPICAL DAILY
Qty: 30 TABLET | Refills: 0 | Status: SHIPPED | OUTPATIENT
Start: 2023-06-20 | End: 2023-07-20

## 2023-06-18 RX ADMIN — Medication 1 MG: at 08:50

## 2023-06-18 RX ADMIN — CHLORDIAZEPOXIDE HYDROCHLORIDE 25 MG: 25 CAPSULE ORAL at 08:50

## 2023-06-18 RX ADMIN — VENLAFAXINE HYDROCHLORIDE 225 MG: 150 CAPSULE, EXTENDED RELEASE ORAL at 08:50

## 2023-06-18 RX ADMIN — HYDROXYZINE HYDROCHLORIDE 25 MG: 25 TABLET ORAL at 09:11

## 2023-06-18 RX ADMIN — THIAMINE HYDROCHLORIDE 200 MG: 100 INJECTION, SOLUTION INTRAMUSCULAR; INTRAVENOUS at 06:05

## 2023-06-18 RX ADMIN — PROPRANOLOL HYDROCHLORIDE 20 MG: 20 TABLET ORAL at 08:50

## 2023-06-18 RX ADMIN — PANTOPRAZOLE SODIUM 40 MG: 40 TABLET, DELAYED RELEASE ORAL at 06:05

## 2023-06-18 RX ADMIN — ONDANSETRON HYDROCHLORIDE 4 MG: 4 TABLET, FILM COATED ORAL at 09:11

## 2023-06-18 RX ADMIN — BUSPIRONE HYDROCHLORIDE 10 MG: 10 TABLET ORAL at 08:50

## 2023-06-18 NOTE — PLAN OF CARE
Goal Outcome Evaluation:  Plan of Care Reviewed With: patient      Medicated for ashlie legs pain once.Alert and oriented.Less anxious this shift.Pt well rested overnight.CIWA scores of 2&3.Remains calm and cooperative with care.No acute changes.

## 2023-06-18 NOTE — DISCHARGE SUMMARY
Patient Name: Mane Gunderson  : 1985  MRN: [id number]    Date of Admission: 2023  Date of Discharge:  2023  Primary Care Physician: Dominique Ontiveros APRN      Chief Complaint:   Vomiting and Balance Issues      Discharge Diagnoses     Active Hospital Problems    Diagnosis  POA   • **Alcoholic hepatitis, unspecified whether ascites present [K70.10]  Yes   • Anxiety [F41.9]  Yes   • Bipolar I disorder [F31.9]  Yes      Resolved Hospital Problems   No resolved problems to display.        Hospital Course     Patient is a 38-year-old male with a history of alcohol use disorder, anxiety, ADHD, bipolar 1 disorder, presented to the ED with complainting  of dizziness, balance issues, vomiting and diarrhea. Patient Has been drinking one pint of alcohol dialy about about a month.  Patient was initiated on his CIWA protocol , multivitamins, however Ativan was not used due to history of reported allergies to Ativan.  Initiated on scheduled Librium which was tapered down.  Neurology was consulted for evaluation of dizziness, exam was consistent with likely vertigo, however patient underwent MRI to rule out acute findings, and MRI came back negative.  Neurology recommended outpatient vestibular therapy.  Dizziness resolved.  GI was consulted for evaluation and management of alcohol hepatitis, and  Per Maddrey's discrimination function score patient did not require steroids.  LFTs and bilirubin gradually improved.  Will need repeat CMP in 1 week to monitor LFTs and bilirubin.  Patient was strongly encouraged alcohol cessation, and resources were provided by access.  Patient stated that he is determined to quit alcohol use.    At the time of discharge patient was told to take all medications as prescribed, keep all follow-up appointments, and call their doctor or return to the hospital with any worsening or concerning symptoms.         Day of Discharge     Subjective:  Seen this morning, sitting up in the room,  brushing his teeth.  States he is feeling better, denies dizziness, able to ambulate without difficulties.  Anxiety improving.  Denies nausea, vomiting, abdominal pain,    Physical Exam:  Temp:  [97.6 °F (36.4 °C)-98.1 °F (36.7 °C)] 97.8 °F (36.6 °C)  Heart Rate:  [73-82] 80  Resp:  [16-18] 16  BP: (112-127)/(82-98) 121/98  Body mass index is 26.24 kg/m².  Physical Exam    General: Alert and oriented x3, no acute distress  HEENT: Normocephalic, atraumatic, + scleral icterus  CV: Regular rate and rhythm, no murmurs   Lungs: Clear to auscultation bilaterally, no crackles or wheezes  Abdomen: Soft, nontender, nondistended  Extremities: No significant peripheral edema , no cyanosis   Skin: Jaundiced    Consultants     Consult Orders (all) (From admission, onward)     Start     Ordered    06/15/23 1445  Inpatient Psychiatrist Consult  Once        Specialty:  Psychiatry  Provider:  Yoseph Espino MD    06/15/23 1445    06/15/23 1053  Inpatient Gastroenterology Consult  Once        Specialty:  Gastroenterology  Provider:  Jonatan Hall MD    06/15/23 1053    06/15/23 0725  Inpatient Neurology Consult General  Once        Specialty:  Neurology  Provider:  Jonatan Guerrero MD    06/15/23 0724    06/14/23 1800  Inpatient consult to [facility]  Once        Provider:  (Not yet assigned)    06/14/23 1800              Procedures     * Surgery not found *      Imaging Results (All)     Procedure Component Value Units Date/Time    MRI Brain With & Without Contrast [[id number]] Collected: 06/15/23 2212     Updated: 06/15/23 2220    Narrative:      BRAIN MRI WITH AND WITHOUT CONTRAST     HISTORY: Dizziness, arrhythmia or new vasoactive medication;  K70.10-Alcoholic hepatitis without ascites; K85.90-Acute pancreatitis  without necrosis or infection, unspecified; K86.1-Other chronic  pancreatitis     COMPARISON: 06/14/2023.     FINDINGS:  Multiplanar images of the head were obtained without and with  gadolinium. No areas of  restricted diffusion are seen to suggest acute  infarct. Ventricles are normal in size. There is no midline shift or  mass effect. There is really no significant microangiopathic change. The  intracranial flow voids appear intact. No abnormality is seen on  susceptibility weighted imaging. Postcontrast imaging does not  demonstrate any abnormal enhancement. There is no evidence of venous  occlusion. There is mucosal thickening noted within the ethmoid sinuses.       Impression:      1. No acute intracranial abnormality.   No abnormal enhancement.     This report was finalized on 6/15/2023 10:17 PM by Dr. Lise Almanza M.D.       CT Head Without Contrast [[id number]] Collected: 06/14/23 1104     Updated: 06/15/23 1044    Narrative:      CT OF THE BRAIN WITHOUT CONTRAST 06/14/2023     HISTORY: Mental status change. Dizziness.     Axial images were obtained through the brain without intravenous  contrast.     The brain parenchyma and ventricular system appear within normal limits.  No mass lesions, midline shift, intracranial hemorrhage or evidence of  infarction is demonstrated.       Impression:      No acute process identified.           Radiation dose reduction techniques were utilized, including automated  exposure control and exposure modulation based on body size.     This report was finalized on 6/15/2023 10:41 AM by Dr. Issac Rodriguez M.D.       CT Abdomen Pelvis With Contrast [[id number]] Collected: 06/14/23 1054     Updated: 06/14/23 1100    Narrative:      EXAMINATION: CT OF THE ABDOMEN AND PELVIS WITH CONTRAST     TECHNIQUE: Computed tomography of the abdomen and pelvis after the  uneventful administration of nonionic intravenous contrast per protocol.  Radiation dose reduction techniques were utilized, including automated  exposure control and exposure modulation based on body size.      HISTORY: Nausea, vomiting meeting, and jaundice     COMPARISON: 04/25/2022     FINDINGS: Limited evaluation of  the inferior thorax demonstrates no  consolidation, pleural effusion, or pneumothorax. The heart is normal in  size and configuration, without pericardial effusion.     There is hepatic steatosis with mild focal sparing near the gallbladder  fossa. There is interval near resolution of peripancreatic stranding  with mild residual pancreatic thickening along the body and with  heterogeneous appearance of the pancreatic parenchyma. The gallbladder  is distended. No biliary ductal dilation is seen. There is mild gastric  distention. The urinary bladder is distended.     The spleen, adrenal glands, kidneys, small bowel, large bowel, and  abdominal vasculature are normal. No intraperitoneal fluid collection or  free gas are seen. No enlarged lymph nodes are demonstrated.     Bone windows demonstrate degenerative changes, without suspicious  osseous lesion seen.       Impression:      1. Improved findings of pancreatitis, with possible residual or  recurrent pancreatitis demonstrated  2. Hepatic steatosis  3. Additional incidental findings as above.     This report was finalized on 6/14/2023 10:57 AM by Dr. Giacomo Jain M.D.       XR Chest 1 View [[id number]] Collected: 06/14/23 1042     Updated: 06/14/23 1046    Narrative:      PORTABLE CHEST X-RAY     HISTORY: Chest pain with dizziness, jaundice, and multiple episodes of  vomiting overnight..     Portable chest x-ray is provided. Correlation: Chest x-ray 08/27/2014.     FINDINGS: The cardiomediastinal silhouette is normal. The lungs are  clear. The costophrenic sulci are dry and the bones appear normal. There  is no pneumothorax. No pneumomediastinum.       Impression:      Negative.     This report was finalized on 6/14/2023 10:42 AM by Dr. Jonatan Wheat M.D.               Pertinent Labs     Results from last 7 days   Lab Units 06/17/23  0646 06/16/23  0641 06/15/23  0538 06/14/23  0955   WBC 10*3/mm3 4.08 3.96 5.74 6.81   HEMOGLOBIN g/dL 11.2* 12.0* 13.9 15.8    PLATELETS 10*3/mm3 136* 123* 119* 144     Results from last 7 days   Lab Units 06/18/23  0616 06/17/23  0646 06/16/23  0641 06/15/23  0538   SODIUM mmol/L 138 140 136 133*   POTASSIUM mmol/L 3.8 3.9 4.1 3.7   CHLORIDE mmol/L 99 101 96* 95*   CO2 mmol/L 31.9* 28.6 29.1* 29.0   BUN mg/dL 5* 7 6 8   CREATININE mg/dL 0.73* 0.73* 0.85 0.98   GLUCOSE mg/dL 125* 138* 104* 98   Estimated Creatinine Clearance: 157.9 mL/min (A) (by C-G formula based on SCr of 0.73 mg/dL (L)).  Results from last 7 days   Lab Units 06/18/23  0616 06/17/23  0646 06/16/23  0641 06/15/23  0538   ALBUMIN g/dL 3.6 3.4* 3.7 4.0   BILIRUBIN mg/dL 2.3* 3.5* 6.1* 9.0*   ALK PHOS U/L 176* 188* 221* 238*   AST (SGOT) U/L 111* 165* 262* 422*   ALT (SGPT) U/L 205* 222* 279* 343*     Results from last 7 days   Lab Units 06/18/23  0616 06/17/23  0646 06/16/23  0641 06/15/23  0538   CALCIUM mg/dL 9.5 9.1 9.3 8.6   ALBUMIN g/dL 3.6 3.4* 3.7 4.0   MAGNESIUM mg/dL  --   --  1.9 2.3   PHOSPHORUS mg/dL  --   --  3.5 2.1*     Results from last 7 days   Lab Units 06/15/23  1450 06/14/23  0955   LIPASE U/L  --  78*   AMMONIA umol/L 43  --              Invalid input(s): LDLCALC          Test Results Pending at Discharge     Pending Labs     Order Current Status    Copper, Free In process          Discharge Details        Discharge Medications      New Medications      Instructions Start Date   thiamine 100 MG tablet  Commonly known as: VITAMIN B1   100 mg, Oral, Daily   Start Date: June 20, 2023        Continue These Medications      Instructions Start Date   amphetamine-dextroamphetamine 10 MG tablet  Commonly known as: ADDERALL   15 mg, Oral, 2 Times Daily      busPIRone 10 MG tablet  Commonly known as: BUSPAR   10 mg, Oral, 3 Times Daily      cloNIDine 0.1 MG tablet  Commonly known as: CATAPRES   0.1 mg, Oral, 2 Times Daily PRN, PRN      cyclobenzaprine 10 MG tablet  Commonly known as: FLEXERIL   10 mg, Oral, 3 Times Daily PRN      DEPLIN 7.5 PO   1 capsule, Oral,  Daily      fluticasone 50 MCG/ACT nasal spray  Commonly known as: FLONASE   2 sprays, Nasal, Daily      hydrOXYzine pamoate 50 MG capsule  Commonly known as: VISTARIL   50 mg, Oral, 4 Times Daily PRN      hyoscyamine sulfate 0.125 MG tablet dispersible disintegrating tablet  Commonly known as: ANASPAZ   125 mcg, Translingual, Every 4 Hours PRN      mirtazapine 15 MG tablet  Commonly known as: REMERON   15 mg, Oral, Nightly      omeprazole 20 MG capsule  Commonly known as: priLOSEC   20 mg, Oral, Daily      Pramoxine HCl (Perianal) 1 % foam   Rectal, Every 2 Hours PRN      propranolol 20 MG tablet  Commonly known as: INDERAL   20 mg, Oral, 3 Times Daily      venlafaxine  MG 24 hr capsule  Commonly known as: EFFEXOR-XR   225 mg, Oral, Every Morning         Stop These Medications    methocarbamol 750 MG tablet  Commonly known as: ROBAXIN            Allergies   Allergen Reactions   • Ativan [Lorazepam] Mental Status Change   • Haldol [Haloperidol Lactate] Other (See Comments)     seizure   • Codeine Itching       Discharge Disposition:  Home or Self Care      Discharge Diet:  Diet Order   Procedures   • Diet: Cardiac Diets; Healthy Heart (2-3 Na+); Texture: Regular Texture (IDDSI 7); Fluid Consistency: Thin (IDDSI 0)       Discharge Activity:       CODE STATUS:    Code Status and Medical Interventions:   Ordered at: 06/14/23 4694     Code Status (Patient has no pulse and is not breathing):    CPR (Attempt to Resuscitate)     Medical Interventions (Patient has pulse or is breathing):    Full       No future appointments.   Follow-up Information     Dominique Ontiveros, JAMAL .    Specialty: Family Medicine  Contact information:  [address] [place], [place] [id number] [phone]                         Time Spent on Discharge:  Greater than 30 minutes      Alfredo Torres MD  [facility]  06/18/23  10:42 EDT

## 2023-06-18 NOTE — NURSING NOTE
Nurse walked patient to entrance A and met with mother, Dara- very pleasant. All personal items taken with patient. No acute distress noted.

## 2023-06-18 NOTE — NURSING NOTE
Change in transportation- mom will transport patient home- [phone]. Patient will meet mom in ER.

## 2023-06-18 NOTE — NURSING NOTE
"[facility] follow-up regarding alcohol use.  Patient awake, sitting in chair, finishing breakfast.  He is pleasant, smiles; reports feeling \"better\", is less anxious.  Rated anxiety at a 4.  Denies hallucinations at this time.  He is hoping to discharge home today.  He has appropriate resources; encouraged to follow-up with his physician/psychiatrist to which he voiced understanding.  He denies SI.    Access following.     "

## 2023-06-18 NOTE — PLAN OF CARE
Goal Outcome Evaluation:                      Patient with discharge orders. Patient states he will Uber home per self. No acute distress noted at this time, will continue to monitor until discharge complete.

## 2023-06-19 ENCOUNTER — OFFICE VISIT (OUTPATIENT)
Dept: PSYCHIATRY | Facility: HOSPITAL | Age: 38
End: 2023-06-19
Payer: COMMERCIAL

## 2023-06-19 DIAGNOSIS — F10.20 UNCOMPLICATED ALCOHOL DEPENDENCE: Primary | ICD-10-CM

## 2023-06-19 PROCEDURE — H0015 ALCOHOL AND/OR DRUG SERVICES: HCPCS | Performed by: SOCIAL WORKER

## 2023-06-19 NOTE — TREATMENT PLAN
Multi-Disciplinary Problems (from Behavioral Health Treatment Plan)      Active Problems       Problem: Substance Abuse Issues  Start Date: 06/19/23      Problem Details: The patient self-scales this problem as a 9 with 10 being the worst.          Goal Priority Start Date Expected End Date End Date    Patient will abstain from mood altering substances. -- 06/19/23 -- --    Goal Details: Progress toward goal:  Not appropriate to rate progress toward goal since this is the initial treatment plan.          Goal Intervention Frequency Start Date End Date    Provide education to increase patients understanding of addiction and relapse processes. Weekly 06/19/23 --    Intervention Details: Duration of treatment until until discharged.          Goal Priority Start Date Expected End Date End Date    Patient will develop insight into how to improve their relationships. -- 06/19/23 -- --    Goal Details: Progress toward goal:  Not appropriate to rate progress toward goal since this is the initial treatment plan.          Goal Intervention Frequency Start Date End Date    Educate about 12 step recovery programs. Weekly 06/19/23 --    Intervention Details: Duration of treatment until until discharged.          Goal Priority Start Date Expected End Date End Date    Patient will improve positive self regard. -- 06/19/23 -- --    Goal Details: Progress toward goal:  Not appropriate to rate progress toward goal since this is the initial treatment plan.          Goal Intervention Frequency Start Date End Date    Educate patient about how substance use affects their relationships. Weekly 06/19/23 --    Intervention Details: Duration of treatment until until discharged.                                 I have discussed and reviewed this treatment plan with the patient.  It has been printed for signatures.

## 2023-06-19 NOTE — PROGRESS NOTES
"CD IOP Group note  Observations:    Engaged in Activity / Process and Self -disclosed: Yes  Applies Topic to self: Yes  Able to give Constructive Feedback: Yes  Affect: anxious  Degree of Insightful Thinking 4  Notes:  4438-2197  Pt processed a chronological of his KAILA. Pt able to verbalize understanding that KAILA is a progressive illness. Pt has \"no\" SI.      Polly Rios, LCSMONTRELL            "

## 2023-06-19 NOTE — PROGRESS NOTES
Teaching Record:  Information / activity:  Alcohol Education    Good participation   [id number]      Polly Rios, LCSW

## 2023-06-19 NOTE — PROGRESS NOTES
"CD IOP Group note  Observations:    Engaged in Activity / Process and Self -disclosed: Yes  Applies Topic to self: Yes  Able to give Constructive Feedback: Yes  Affect: anxious  Degree of Insightful Thinking 5  Notes:  0940-4040  Assisted pt with formulation of and processing pt sobriety plan for today. Pt has \"no\" SI.      Polly Rios, HELENW            "

## 2023-06-19 NOTE — OUTREACH NOTE
Prep Survey      Flowsheet Row Responses   [facility] patient discharged from? [facility]   Is LACE score < 7 ? No   Eligibility Not Eligible   What are the reasons patient is not eligible? Other   Does the patient have one of the following disease processes/diagnoses(primary or secondary)? Other   Prep survey completed? Yes            Frida STOCK - Registered Nurse

## 2023-06-20 ENCOUNTER — OFFICE VISIT (OUTPATIENT)
Dept: PSYCHIATRY | Facility: HOSPITAL | Age: 38
End: 2023-06-20
Payer: COMMERCIAL

## 2023-06-20 DIAGNOSIS — F10.20 UNCOMPLICATED ALCOHOL DEPENDENCE: Primary | ICD-10-CM

## 2023-06-20 PROCEDURE — H0015 ALCOHOL AND/OR DRUG SERVICES: HCPCS | Performed by: SOCIAL WORKER

## 2023-06-20 NOTE — PROGRESS NOTES
"CD IOP Group note  Observations:    Engaged in Activity / Process and Self -disclosed: Yes  Applies Topic to self: Yes  Able to give Constructive Feedback: Yes  Affect: anxious  Degree of Insightful Thinking 5  Notes:  7035-0928  Assisted pt with formulation of and processing his sobriety plan for today. Pt has \"no\" SI.      Polly Rios, HELENW            "

## 2023-06-20 NOTE — PROGRESS NOTES
"CD IOP Group note  Observations:    Engaged in Activity / Process and Self -disclosed: Yes  Applies Topic to self: Yes  Able to give Constructive Feedback: Yes  Affect: anxious  Degree of Insightful Thinking 5  Notes:  5067-6291  Pt processed many losses in his life and related PTSD symptoms. Pt has \"no\" SI.      Polly Rios, MATTEO            "

## 2023-06-20 NOTE — PROGRESS NOTES
Teaching Record:  Information / activity:  Expressive Therapy  7180-8140 Art Therapy - Bridge Drawing - Used markers on paper to created an image of a bridge as a metaphor representing the transition from addiction to recovery. Indicated where might be on the transition and shared with the group.     Good participation  Pt focused on the task, shared of ideas related to ETOH and described frustration related to interactions with mother. Pt shared the bridge as twisted to represent addiction as upside down. Pt also shared this is a reminder to continue to work toward recovery. Pt accepted feedback.      Michael Martinez LPAT

## 2023-06-23 LAB — COPPER, FREE: 280 MCG/L

## 2023-07-04 ENCOUNTER — APPOINTMENT (OUTPATIENT)
Dept: PSYCHIATRY | Facility: HOSPITAL | Age: 38
End: 2023-07-04
Payer: COMMERCIAL

## 2023-07-24 ENCOUNTER — OFFICE VISIT (OUTPATIENT)
Dept: PSYCHIATRY | Facility: HOSPITAL | Age: 38
End: 2023-07-24
Payer: COMMERCIAL

## 2023-07-24 DIAGNOSIS — F10.20 UNCOMPLICATED ALCOHOL DEPENDENCE: Primary | ICD-10-CM

## 2023-07-24 PROCEDURE — H0015 ALCOHOL AND/OR DRUG SERVICES: HCPCS | Performed by: SOCIAL WORKER

## 2023-07-24 NOTE — PROGRESS NOTES
"CD IOP Group note  Observations:    Engaged in Activity / Process and Self -disclosed: Yes  Applies Topic to self: Yes  Able to give Constructive Feedback: Yes  Affect: anxious  Degree of Insightful Thinking 6  Notes:  6147-6541  Pt processed being \"mad at God for so long\" \"I have made my peace with God now\" Pt processed sadness and early childhood trauma. Pt has \"no\" KELSEY Rios, HELENW            "

## 2023-07-24 NOTE — PROGRESS NOTES
Teaching Record:  Information / activity:  Spirituality    Moderate participation   [phone]      Polly Rios, LCSW

## 2023-07-25 ENCOUNTER — OFFICE VISIT (OUTPATIENT)
Dept: PSYCHIATRY | Facility: HOSPITAL | Age: 38
End: 2023-07-25
Payer: COMMERCIAL

## 2023-07-25 DIAGNOSIS — F10.20 UNCOMPLICATED ALCOHOL DEPENDENCE: Primary | ICD-10-CM

## 2023-07-25 PROCEDURE — H0015 ALCOHOL AND/OR DRUG SERVICES: HCPCS | Performed by: SOCIAL WORKER

## 2023-07-25 NOTE — PROGRESS NOTES
"CD IOP Group note  Observations:    Engaged in Activity / Process and Self -disclosed: Yes  Applies Topic to self: Yes  Able to give Constructive Feedback: Yes  Affect: anxious and bright  Degree of Insightful Thinking 5  Notes:    Pt processed triggers and cravings. Pt processed his relapse prevention plan. Pt has \"no\" TRES Rios LCSW            "

## 2023-07-25 NOTE — PROGRESS NOTES
Teaching Record:  Information / activity:  Expressive Therapy  8844-1425 Art Therapy - Used magazines to create and two part collage describing 1) Life in Addiction and 2) What I Hope Live in Recovery Will Be; Processed with the group    Good participation  Pt focused on the task, shared with the group and accepted peer feedback. Pt also shared how related to peers      Michael Martinez LPAT

## 2023-07-25 NOTE — PROGRESS NOTES
"CD IOP Group note  Observations:    Engaged in Activity / Process and Self -disclosed: Yes  Applies Topic to self: Yes  Able to give Constructive Feedback: Yes  Affect: anxious  Degree of Insightful Thinking 6  Notes:  6528-9939  Assisted pt with formulation and processing pt sobriety plan for today. Pt has \"no\" SI.      Polly Rios, HELENW            "

## 2023-07-26 ENCOUNTER — OFFICE VISIT (OUTPATIENT)
Dept: PSYCHIATRY | Facility: HOSPITAL | Age: 38
End: 2023-07-26
Payer: COMMERCIAL

## 2023-07-26 DIAGNOSIS — F10.20 UNCOMPLICATED ALCOHOL DEPENDENCE: Primary | ICD-10-CM

## 2023-07-26 PROCEDURE — H0015 ALCOHOL AND/OR DRUG SERVICES: HCPCS | Performed by: SOCIAL WORKER

## 2023-07-26 NOTE — PROGRESS NOTES
Teaching Record:  Information / activity:  Stages of Family Recovery - Family Program    Good participation  [phone]      Polly Rios, LCSW

## 2023-07-26 NOTE — PROGRESS NOTES
"CD IOP Group note  Observations:    Engaged in Activity / Process and Self -disclosed: Yes  Applies Topic to self: Yes  Able to give Constructive Feedback: Yes  Affect: anxious  Degree of Insightful Thinking 5  Notes:  9779-5147  Pt brought his mother to group today. Pt processed triggers and cravings. Pt mother spoke that pt primary diet is sugar. Referred pt to speak with provider about order for RD working with pt on regular basis to help pt to initiate healthier eating habits and strategies and follow up with pt. Educated on food and mood closely related. Pt has digestive issues that often can be traced back to diet.  Pt has \"no\" SI.      Polly Rios, HELENW            "

## 2023-07-26 NOTE — PROGRESS NOTES
"CD IOP Group note  Observations:    Engaged in Activity / Process and Self -disclosed: Yes  Applies Topic to self: Yes  Able to give Constructive Feedback: Yes  Affect: anxious  Degree of Insightful Thinking 5  Notes:  5058-8008  Pt processed change he has observed in his mother. \"She looks less worried and stressed\"  Pt could identify changes in himself.Pt processed how he has accepted that recovery can be a reality from both psychiatric and KAILA issues.  Pt has \"no\" KELSEY Rios LCSW            "

## 2023-07-27 ENCOUNTER — OFFICE VISIT (OUTPATIENT)
Dept: PSYCHIATRY | Facility: HOSPITAL | Age: 38
End: 2023-07-27
Payer: COMMERCIAL

## 2023-07-27 DIAGNOSIS — F10.20 UNCOMPLICATED ALCOHOL DEPENDENCE: Primary | ICD-10-CM

## 2023-07-27 PROCEDURE — H0015 ALCOHOL AND/OR DRUG SERVICES: HCPCS | Performed by: SOCIAL WORKER

## 2023-07-27 NOTE — TREATMENT PLAN
Multi-Disciplinary Problems (from Behavioral Health Treatment Plan)      Active Problems       Not on file                     I have discussed and reviewed this treatment plan with the patient.  It has been printed for signatures.

## 2023-07-27 NOTE — PROGRESS NOTES
"CD IOP Group note  Observations:    Engaged in Activity / Process and Self -disclosed: Yes  Applies Topic to self: Yes  Able to give Constructive Feedback: Yes  Affect: anxious and bright  Degree of Insightful Thinking 6  Notes:  4054-3076  Pt processed triggers and cravings to use ETOH. Pt processed tools he is applying to not drink. Pt has \"no\" SI.      Polly Rios, LCSMONTRELL            "

## 2023-07-27 NOTE — PROGRESS NOTES
"CD IOP Group note  Observations:    Engaged in Activity / Process and Self -disclosed: Yes  Applies Topic to self: Yes  Able to give Constructive Feedback: Yes  Affect: anxious  Degree of Insightful Thinking 5  Notes:  0214-2528  Assisted pt with formulation of and processing weekend sobriety plan. Pt has \"no\" TRES.      Polly Rios, HELENW            "

## 2023-07-27 NOTE — PROGRESS NOTES
Teaching Record:  Information / activity:  Planning for Sobriety    Good participation   [phone]      Polly Rios, LCSW

## 2023-07-31 ENCOUNTER — APPOINTMENT (OUTPATIENT)
Dept: PSYCHIATRY | Facility: HOSPITAL | Age: 38
End: 2023-07-31
Payer: COMMERCIAL

## 2023-07-31 ENCOUNTER — TELEPHONE (OUTPATIENT)
Dept: PSYCHIATRY | Facility: HOSPITAL | Age: 38
End: 2023-07-31
Payer: COMMERCIAL

## 2023-08-01 ENCOUNTER — OFFICE VISIT (OUTPATIENT)
Dept: PSYCHIATRY | Facility: HOSPITAL | Age: 38
End: 2023-08-01
Payer: COMMERCIAL

## 2023-08-01 ENCOUNTER — TELEPHONE (OUTPATIENT)
Dept: PSYCHIATRY | Facility: HOSPITAL | Age: 38
End: 2023-08-01
Payer: COMMERCIAL

## 2023-08-01 DIAGNOSIS — F10.20 UNCOMPLICATED ALCOHOL DEPENDENCE: Primary | ICD-10-CM

## 2023-08-02 ENCOUNTER — APPOINTMENT (OUTPATIENT)
Dept: PSYCHIATRY | Facility: HOSPITAL | Age: 38
End: 2023-08-02
Payer: COMMERCIAL

## 2023-08-02 ENCOUNTER — OFFICE VISIT (OUTPATIENT)
Dept: PSYCHIATRY | Facility: HOSPITAL | Age: 38
End: 2023-08-02
Payer: COMMERCIAL

## 2023-08-02 DIAGNOSIS — F10.20 UNCOMPLICATED ALCOHOL DEPENDENCE: Primary | ICD-10-CM

## 2023-08-02 PROCEDURE — H0015 ALCOHOL AND/OR DRUG SERVICES: HCPCS | Performed by: ART THERAPIST

## 2023-08-03 ENCOUNTER — OFFICE VISIT (OUTPATIENT)
Dept: PSYCHIATRY | Facility: HOSPITAL | Age: 38
End: 2023-08-03
Payer: COMMERCIAL

## 2023-08-03 DIAGNOSIS — F10.20 UNCOMPLICATED ALCOHOL DEPENDENCE: Primary | ICD-10-CM

## 2023-08-03 PROCEDURE — H0015 ALCOHOL AND/OR DRUG SERVICES: HCPCS | Performed by: ART THERAPIST

## 2023-08-04 ENCOUNTER — APPOINTMENT (OUTPATIENT)
Dept: PSYCHIATRY | Facility: HOSPITAL | Age: 38
End: 2023-08-04
Payer: COMMERCIAL

## 2023-08-07 ENCOUNTER — OFFICE VISIT (OUTPATIENT)
Dept: PSYCHIATRY | Facility: HOSPITAL | Age: 38
End: 2023-08-07
Payer: COMMERCIAL

## 2023-08-07 DIAGNOSIS — F10.20 UNCOMPLICATED ALCOHOL DEPENDENCE: Primary | ICD-10-CM

## 2023-08-07 PROCEDURE — H0015 ALCOHOL AND/OR DRUG SERVICES: HCPCS | Performed by: SOCIAL WORKER

## 2023-08-07 NOTE — PROGRESS NOTES
"CD IOP Group note  Observations:    Engaged in Activity / Process and Self -disclosed: Yes  Applies Topic to self: Yes  Able to give Constructive Feedback: Yes  Affect: anxious  Degree of Insightful Thinking 5  Notes:  5312-6095  Pt processed cravings for ETOH. Pt having digestive issues that this therapist recommends pt have evaluated as they are lowering his level of functioning as he is unable to leave his home on time to get to IOP and was having trouble getting to work on time in the past.  Pt has \"no\" SI.      Polly Rios, HELENW            "

## 2023-08-07 NOTE — PROGRESS NOTES
"CD IOP Group note  Observations:    Engaged in Activity / Process and Self -disclosed: Yes  Applies Topic to self: Yes  Able to give Constructive Feedback: Yes  Affect: anxious  Degree of Insightful Thinking 5  Notes:  8817-6998  Assisted pt with formulation of and processing his sobriety plan for today. Pt has \"no\" SI.      Polly Rios, HELENW            "

## 2023-08-07 NOTE — PROGRESS NOTES
Teaching Record:  Information / activity:  Planning for Sobriety    Good participation    [id number]      Polly Rios, LCSW

## 2023-08-08 ENCOUNTER — OFFICE VISIT (OUTPATIENT)
Dept: PSYCHIATRY | Facility: HOSPITAL | Age: 38
End: 2023-08-08
Payer: COMMERCIAL

## 2023-08-08 ENCOUNTER — TELEPHONE (OUTPATIENT)
Dept: PSYCHIATRY | Facility: HOSPITAL | Age: 38
End: 2023-08-08
Payer: COMMERCIAL

## 2023-08-08 DIAGNOSIS — F10.20 UNCOMPLICATED ALCOHOL DEPENDENCE: Primary | ICD-10-CM

## 2023-08-08 PROCEDURE — H0015 ALCOHOL AND/OR DRUG SERVICES: HCPCS | Performed by: SOCIAL WORKER

## 2023-08-08 NOTE — PROGRESS NOTES
"CD IOP Group note  Observations:    Engaged in Activity / Process and Self -disclosed: Yes  Applies Topic to self: Yes  Able to give Constructive Feedback: Yes  Affect: anxious  Degree of Insightful Thinking 6  Notes:  8548-5553  Pt processed having exciting break through at work. Pt stayed at work last night till after 1 am. Pt processed having digestive pain cramping. \"I went to sleep about 4 am. Consequently, pt tardy today. Strongly urged pt to go to his doctor for treatment of issues of digestion. Pt last had scopes 3 years ago and at that time he was drinking heavy. Pt has \"no\" TRES Rios, HELENW            "

## 2023-08-08 NOTE — PROGRESS NOTES
"CD IOP Group note  Observations:    Engaged in Activity / Process and Self -disclosed: Yes  Applies Topic to self: Yes  Able to give Constructive Feedback: Yes  Affect: anxious  Degree of Insightful Thinking 5  Notes:  3146-7707  Assisted pt with formulation of and processing his sobriety plan for today. Pt processed cravings for ETOH yesterday. Encouraged pt to increase his sober network. Urged pt to go to AA meetings more frequently that once a week. Pt has \"no\" SI.  Pt has \"no\" SI      Polly Rios, HELENW            "

## 2023-08-09 ENCOUNTER — OFFICE VISIT (OUTPATIENT)
Dept: PSYCHIATRY | Facility: HOSPITAL | Age: 38
End: 2023-08-09
Payer: COMMERCIAL

## 2023-08-09 DIAGNOSIS — F10.20 UNCOMPLICATED ALCOHOL DEPENDENCE: Primary | ICD-10-CM

## 2023-08-09 PROCEDURE — H0015 ALCOHOL AND/OR DRUG SERVICES: HCPCS | Performed by: SOCIAL WORKER

## 2023-08-09 NOTE — PROGRESS NOTES
"CD IOP Group note  Observations:    Engaged in Activity / Process and Self -disclosed: Yes  Applies Topic to self: Yes  Able to give Constructive Feedback: Yes  Affect: anxious  Degree of Insightful Thinking 5  Notes:  7025-6827  Pt processed anxiety and frustration concerning his digestive health. \"I have pain and cramping daily with digestion\"  Pain is primary trigger as pt used to drink to numb his upper and lower GI distress/pain\" \"I am grateful that I am having some less of pain recently\" \"I have few hours almost daily where I am not having pain\" Family member of peer in group, who os MD suggested to pt that he exercise daily for 15 20 min and it will help autonomic digestion. It will also help with sleep as your exercising will help induce sleep. 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 Pt has \"no\" KELSEY Rios, LCSW            "
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
## 2023-08-09 NOTE — PROGRESS NOTES
Teaching Record:  Information / activity:  Roadmap for Recovery - Family Program    Good participation   [id number]      Polly Rios, HELENW

## 2023-08-09 NOTE — PROGRESS NOTES
"CD IOP Group note  Observations:    Engaged in Activity / Process and Self -disclosed: Yes  Applies Topic to self: Yes  Able to give Constructive Feedback: Yes  Affect: anxious  Degree of Insightful Thinking 5  Notes:  5894-5931  Pt processed guilt and shame she as concerning the pain he has put the family, especially his parents when pt in active KAILA.   Pt has \"no\" TRES.      Polly Rios, [facility]            "

## 2023-08-10 ENCOUNTER — OFFICE VISIT (OUTPATIENT)
Dept: PSYCHIATRY | Facility: HOSPITAL | Age: 38
End: 2023-08-10
Payer: COMMERCIAL

## 2023-08-10 DIAGNOSIS — F10.20 UNCOMPLICATED ALCOHOL DEPENDENCE: Primary | ICD-10-CM

## 2023-08-10 PROCEDURE — H0015 ALCOHOL AND/OR DRUG SERVICES: HCPCS | Performed by: SOCIAL WORKER

## 2023-08-10 NOTE — PROGRESS NOTES
"CD IOP Group note  Observations:    Engaged in Activity / Process and Self -disclosed: Yes  Applies Topic to self: Yes  Able to give Constructive Feedback: Yes  Affect: anxious  Degree of Insightful Thinking 6  Notes:  2088-2824  Assisted pt with formulation of and processing his weekend sobriety plan. Pt has \"no\" SI.      Polly Rios, HELENW            "

## 2023-08-10 NOTE — PROGRESS NOTES
"CD IOP Group note  Observations:    Engaged in Activity / Process and Self -disclosed: Yes  Applies Topic to self: Yes  Able to give Constructive Feedback: Yes  Affect: anxious and bright  Degree of Insightful Thinking 6  Notes:  0372-4646  Pt processed anxiety. Pt processed gratitude for his recovery and supportive [facility] IOP   PT encouraging to peer in group. Pt has \"no\" SI.      Polly Rios, MATTEO            "

## 2023-08-11 ENCOUNTER — APPOINTMENT (OUTPATIENT)
Dept: PSYCHIATRY | Facility: HOSPITAL | Age: 38
End: 2023-08-11
Payer: COMMERCIAL

## 2023-08-14 ENCOUNTER — OFFICE VISIT (OUTPATIENT)
Dept: PSYCHIATRY | Facility: HOSPITAL | Age: 38
End: 2023-08-14
Payer: COMMERCIAL

## 2023-08-14 DIAGNOSIS — F10.20 UNCOMPLICATED ALCOHOL DEPENDENCE: Primary | ICD-10-CM

## 2023-08-14 PROCEDURE — H0015 ALCOHOL AND/OR DRUG SERVICES: HCPCS | Performed by: SOCIAL WORKER

## 2023-08-14 NOTE — PROGRESS NOTES
"CD IOP Group note  Observations:    Engaged in Activity / Process and Self -disclosed: Yes  Applies Topic to self: Yes  Able to give Constructive Feedback: Yes  Affect: anxious  Degree of Insightful Thinking 5  Notes:  6218-7775  Pt processed anxiety. Pt listed each trigger for anxiety that results in craving to use ETOH, and positive coping response for each listed. Pt has \"no\" SI.      Polly Rios, LCSW            "

## 2023-08-14 NOTE — PROGRESS NOTES
Teaching Record:  Information / activity:  Triggers and Cravings - Family Program    Good participation   [id number]      Polly Rios, LCSW

## 2023-08-14 NOTE — PROGRESS NOTES
"CD IOP Group note  Observations:    Engaged in Activity / Process and Self -disclosed: Yes  Applies Topic to self: Yes  Able to give Constructive Feedback: Yes  Affect: anxious  Degree of Insightful Thinking 6  Notes:  5995-3248  Pt processed cravings he had daily over the past weekend. Pt processed goal to increase his sober support network. Pt has \"no\" SI.      Polly Rios, MATTEO            "

## 2023-08-15 ENCOUNTER — OFFICE VISIT (OUTPATIENT)
Dept: PSYCHIATRY | Facility: HOSPITAL | Age: 38
End: 2023-08-15
Payer: COMMERCIAL

## 2023-08-15 DIAGNOSIS — F10.20 UNCOMPLICATED ALCOHOL DEPENDENCE: Primary | ICD-10-CM

## 2023-08-15 PROCEDURE — H0015 ALCOHOL AND/OR DRUG SERVICES: HCPCS | Performed by: SOCIAL WORKER

## 2023-08-15 NOTE — PROGRESS NOTES
"CD IOP Group note  Observations:    Engaged in Activity / Process and Self -disclosed: Yes  Applies Topic to self: Yes  Able to give Constructive Feedback: Yes  Affect: anxious  Degree of Insightful Thinking 6  Notes:  8269-3628  Pt processed anxiety. Pt processed intrusive thoughts of trauma. Since pt stopped drinking the PTSD flash backs have resurfaced. Discussed with pt of using Acamprosate for cravings for ETOH. Pt has \"no\" SI.      Polly Rios, LCSW            "

## 2023-08-15 NOTE — PROGRESS NOTES
Teaching Record:  Information / activity:  Expressive Therapy  6958-5864 Art Therapy - Used markers on paper to create a Bridge Drawing. Represented bridge as a transition from life in addiction to what wants life in recovery to be. Marked place on the bridge feels today. Processed with the group.    Good participation  Pt shared of the dangers of life in addiction and the hopes for the future in recovery. Related to peers and accepted positive feedback.      Michael Martinez, LPAT

## 2023-08-15 NOTE — PROGRESS NOTES
"CD IOP Group note  Observations:    Engaged in Activity / Process and Self -disclosed: Yes  Applies Topic to self: Yes  Able to give Constructive Feedback: Yes  Affect: anxious  Degree of Insightful Thinking 6  Notes:  Assisted pt with formulation of and processing his sobriety plan for today. Pt has \"no\" KELSEY Rios LCSW            "

## 2023-08-16 ENCOUNTER — OFFICE VISIT (OUTPATIENT)
Dept: PSYCHIATRY | Facility: HOSPITAL | Age: 38
End: 2023-08-16
Payer: COMMERCIAL

## 2023-08-16 DIAGNOSIS — F10.20 UNCOMPLICATED ALCOHOL DEPENDENCE: Primary | ICD-10-CM

## 2023-08-16 PROCEDURE — H0015 ALCOHOL AND/OR DRUG SERVICES: HCPCS | Performed by: SOCIAL WORKER

## 2023-08-16 NOTE — PROGRESS NOTES
"CD IOP Group note  Observations:    Engaged in Activity / Process and Self -disclosed: Yes  Applies Topic to self: Yes  Able to give Constructive Feedback: Yes  Affect: anxious  Degree of Insightful Thinking 6  Notes:  9058-2373  Pt brought his mother to family program today. Pt processed remorse for his dishonesty during his active addition.  Pt mother supportive. She encouraged pt to get phone numbers of AA members to call if pt mother phone access is interrupted. Pt called his mother yesterday when he was having a strong craving. Pt encouraged to increase his sober network of people. Pt has \"no\" SI.      Polly Rios LCSW            "

## 2023-08-16 NOTE — PLAN OF CARE
"Pt has attended 33 [facility] IOP sessions. Pt's mother comes to family programming weekly with pt. Pt attending recovery programing  weekly. Pt processing strong cravings for ETOH and flash backs of trauma. Pt has \"no\" SI.  "

## 2023-08-16 NOTE — PROGRESS NOTES
Teaching Record:  Information / activity:  Spirituality and Planning for Sobriety    Good participation   [id number]      Polly Rios, LCSW

## 2023-08-16 NOTE — PROGRESS NOTES
"CD IOP Group note  Observations:    Engaged in Activity / Process and Self -disclosed: Yes  Applies Topic to self: Yes  Able to give Constructive Feedback: Yes  Affect: anxious  Degree of Insightful Thinking 5  Notes:  9836-4409  Pt processed gratitude for his mothers support and IOP group members. Pt mad list of coping skill to address each trigger to drink ETOH.  Pt has \"no\" TRES.      Polly Rios, LCSW            "

## 2023-08-17 ENCOUNTER — OFFICE VISIT (OUTPATIENT)
Dept: PSYCHIATRY | Facility: HOSPITAL | Age: 38
End: 2023-08-17
Payer: COMMERCIAL

## 2023-08-17 DIAGNOSIS — F10.20 UNCOMPLICATED ALCOHOL DEPENDENCE: Primary | ICD-10-CM

## 2023-08-17 PROCEDURE — H0015 ALCOHOL AND/OR DRUG SERVICES: HCPCS | Performed by: SOCIAL WORKER

## 2023-08-17 NOTE — PROGRESS NOTES
"CD IOP Group note  Observations:    Engaged in Activity / Process and Self -disclosed: Yes  Applies Topic to self: Yes  Able to give Constructive Feedback: Yes  Affect: anxious  Degree of Insightful Thinking 5  Notes:  6016-9959  Assisted pt with formulation of and processing his weekend sobriety plan. Pt has \"no\" SI.      Polly Rios, HELENW            "

## 2023-08-17 NOTE — PROGRESS NOTES
"CD IOP Group note  Observations:    Engaged in Activity / Process and Self -disclosed: Yes  Applies Topic to self: Yes  Able to give Constructive Feedback: Yes  Affect: anxious  Degree of Insightful Thinking 5  Notes:  7015-9774  Pt processed anxiety and strong triggers. Pt AA sponsor is work partner. Pt is not utilizing AA sponsor because of the work environment.  Pt will find another AA sponsor as goal for the weekend coming up. Pt will attend new AA meetings to increase sober network.  Pt has \"no\" TRES Rios LCSW            "

## 2023-08-17 NOTE — PROGRESS NOTES
Teaching Record:  Information / activity:  Spirituality and Process Addictions    Good participation   [id number]      Polly Rios, LCSW

## 2023-08-18 ENCOUNTER — APPOINTMENT (OUTPATIENT)
Dept: PSYCHIATRY | Facility: HOSPITAL | Age: 38
End: 2023-08-18
Payer: COMMERCIAL

## 2023-08-21 ENCOUNTER — OFFICE VISIT (OUTPATIENT)
Dept: PSYCHIATRY | Facility: HOSPITAL | Age: 38
End: 2023-08-21
Payer: COMMERCIAL

## 2023-08-21 DIAGNOSIS — F10.20 UNCOMPLICATED ALCOHOL DEPENDENCE: Primary | ICD-10-CM

## 2023-08-21 PROCEDURE — H0015 ALCOHOL AND/OR DRUG SERVICES: HCPCS | Performed by: SOCIAL WORKER

## 2023-08-21 NOTE — PROGRESS NOTES
"[id number]  CD IOP Group note  Observations:    Engaged in Activity / Process and Self -disclosed: Yes  Applies Topic to self: Yes  Able to give Constructive Feedback: Yes  Affect: anxious  Degree of Insightful Thinking 5  Notes:  Pt processed reasons why going to meetings and getting a sponsor are not effective. Discussed contempt prior to investigation is not in pt best interest. Pt assisted with today's sobriety plan formulation and processing. Pt has \"no\" SI.      Polly Rios LCSW            "

## 2023-08-21 NOTE — PROGRESS NOTES
Teaching Record:  Information / activity:  Planning for Sobriety    Good participation    0612-9900      Polly Rios, LCSW

## 2023-08-21 NOTE — PROGRESS NOTES
"CD IOP Group note  Observations:    Engaged in Activity / Process and Self -disclosed: Yes  Applies Topic to self: Yes  Able to give Constructive Feedback: Yes  Affect: anxious  Degree of Insightful Thinking 6  Notes:  3056-1043  Pt processed anxiety and sadness. Pt encouraged to attend AA meetings and get AA sponsor. Pt processed significant cravings for ETOH.  Contracted with pt to attend AA this week daily via zoom and or in person. Pt has \"no\" TRES.      Polly Rios, MATTEO            "

## 2023-08-22 ENCOUNTER — OFFICE VISIT (OUTPATIENT)
Dept: PSYCHIATRY | Facility: HOSPITAL | Age: 38
End: 2023-08-22
Payer: COMMERCIAL

## 2023-08-22 DIAGNOSIS — F10.20 UNCOMPLICATED ALCOHOL DEPENDENCE: Primary | ICD-10-CM

## 2023-08-22 PROCEDURE — H0015 ALCOHOL AND/OR DRUG SERVICES: HCPCS | Performed by: SOCIAL WORKER

## 2023-08-22 NOTE — PROGRESS NOTES
"CD IOP Group note  Observations:    Engaged in Activity / Process and Self -disclosed: Yes  Applies Topic to self: Yes  Able to give Constructive Feedback: Yes  Affect: anxious and bright  Degree of Insightful Thinking 6  Notes:  4034-0603  Pt assisted with formulation of and processing today's sobriety plan. Pt has \"no\" TRES Rios, HELENW            "

## 2023-08-22 NOTE — PROGRESS NOTES
"CD IOP Group note  Observations:    Engaged in Activity / Process and Self -disclosed: Yes  Applies Topic to self: Yes  Able to give Constructive Feedback: Yes  Affect: bright  Degree of Insightful Thinking 6  Notes:  7417-5367  Pt processed new information of fact that ET OH is poison to our bodies and especially in escalating other physical disease. Pt processed his mothers breast cancer journey and fact that is she uses ETOH her chances of cancer reoccurring is greater than those survivors who do not use ET OH. Pt has \"no\" TRES Rios, HELENW            "

## 2023-08-22 NOTE — PROGRESS NOTES
Teaching Record:  Information / activity:  Expressive Therapy  7408-8777 Art Therapy - Used colored construction paper to create a mosaic of a creature which embodies the qualities of addiction; Named the creature; Wrote a dialogue between self and the creature. Process with the group.    Good participation  Pt focused on the task, named the creature, and wrote a dialogue. Pt shared it with the group and explained the time thought would give-up to have self die from the addiction. Pt shared current focus is to help others while in recovery. Peers offered positive feedback and related to the struggles with the addiction.      Michael Martinez, LPAT

## 2023-08-23 ENCOUNTER — OFFICE VISIT (OUTPATIENT)
Dept: PSYCHIATRY | Facility: HOSPITAL | Age: 38
End: 2023-08-23
Payer: COMMERCIAL

## 2023-08-23 DIAGNOSIS — F10.20 UNCOMPLICATED ALCOHOL DEPENDENCE: Primary | ICD-10-CM

## 2023-08-23 PROCEDURE — H0015 ALCOHOL AND/OR DRUG SERVICES: HCPCS | Performed by: SOCIAL WORKER

## 2023-08-23 NOTE — PROGRESS NOTES
"CD IOP Group note  Observations:    Engaged in Activity / Process and Self -disclosed: Yes  Applies Topic to self: Yes  Able to give Constructive Feedback: Yes  Affect: anxious  Degree of Insightful Thinking 6  Notes:  5360-9688  Pt processed all the reasons he could not attend AA meetings. Detailed plan for pt to attend 4 AA meetings this week and over the weekend. Pt has \"no\" KELSEY Rios LCSW            "

## 2023-08-23 NOTE — PROGRESS NOTES
"Family session with pt and his mother   Pt mother addressed her fear that pt will not be able to get up every day to get to work on time. Pt developed relapse prevention plan which includes going to 4 AA meetings. Pt mother attends family sessions with pt each week. Pt will come to [facility] when d/c. Pt has \"no\" SI.  "

## 2023-08-23 NOTE — PROGRESS NOTES
"CD IOP Group note  Observations:    Engaged in Activity / Process and Self -disclosed: Yes  Applies Topic to self: Yes  Able to give Constructive Feedback: Yes  Affect: anxious  Degree of Insightful Thinking 5  Notes:  2559-5030  Pt brought his mother to family programming today. Pt processed anxiety and frustration. Pt will have family session after programing today with this therapist and pt mother. Pt processed gratitude for his mothers support. Pt has \"no\" TRES.      Polly Rios LCSW            "

## 2023-08-23 NOTE — PROGRESS NOTES
Teaching Record:  Information / activity:  Families in Recovery - Family Program    Good participation   [phone]      Polly Rios, HELENW

## 2023-08-24 ENCOUNTER — APPOINTMENT (OUTPATIENT)
Dept: PSYCHIATRY | Facility: HOSPITAL | Age: 38
End: 2023-08-24
Payer: COMMERCIAL

## 2023-08-25 ENCOUNTER — APPOINTMENT (OUTPATIENT)
Dept: PSYCHIATRY | Facility: HOSPITAL | Age: 38
End: 2023-08-25
Payer: COMMERCIAL

## 2023-08-28 ENCOUNTER — OFFICE VISIT (OUTPATIENT)
Dept: PSYCHIATRY | Facility: HOSPITAL | Age: 38
End: 2023-08-28
Payer: COMMERCIAL

## 2023-08-28 DIAGNOSIS — F10.20 UNCOMPLICATED ALCOHOL DEPENDENCE: Primary | ICD-10-CM

## 2023-08-28 PROCEDURE — H0015 ALCOHOL AND/OR DRUG SERVICES: HCPCS | Performed by: SOCIAL WORKER

## 2023-08-28 NOTE — PROGRESS NOTES
"CD IOP Group note  Observations:    Engaged in Activity / Process and Self -disclosed: Yes  Applies Topic to self: Yes  Able to give Constructive Feedback: Yes  Affect: anxious  Degree of Insightful Thinking 6  Notes:  4976-8927  Pt processed confidence that going to liquor store frequently to purchase nicotine vap was not high risk for pt. Educated pt on neurochemistry of KAILA is conclusive that going to high risk environment is not conclusive to recovery. Reminded pt that going into high risk environment is not advised. Quite the opposite. Pt attempted to justify going by saying \"I never go there when I am not doing well or having cravings for ETOH.   Pt minimizes by saying \"I now I am not going to drink and do not look at the bottles\"  Educated pt on step one is not a fight but surrender knowing that powerlessness but not helpfulness. Pt discussed that he did not go to any AA meetings even thought he committed to going to 4 over the Friday, Saturday, and Sunday. Pt stated he was not feeling well and had back issues so he did not go.  Pt list of zoom meetings to attend prior to the weekend, but did not attend. Pt attempted to justify and minimize reasons why he did not attend AA zoom. Encouraged pt and supported decision to attend 1030 pm zoom AA meeting tonight after work.  . Pt has \"no\" TRES Rios LCSW            "

## 2023-08-28 NOTE — PROGRESS NOTES
"CD IOP Group note  Observations:    Engaged in Activity / Process and Self -disclosed: Yes  Applies Topic to self: Yes  Able to give Constructive Feedback: Yes  Affect: anxious  Degree of Insightful Thinking 5  Notes:  4926-8880  Assisted pt with formulation of and processing his sobriety plan for today. Pt has \"no\" SI.      Polly Rios, HELENW            "

## 2023-08-29 ENCOUNTER — APPOINTMENT (OUTPATIENT)
Dept: PSYCHIATRY | Facility: HOSPITAL | Age: 38
End: 2023-08-29
Payer: COMMERCIAL

## 2023-08-29 NOTE — PROGRESS NOTES
Discharge Summary    Mane attended  37  Sessions         Registration Date 6-19  Discharge Date  8/29/2023       Summary of Treatment and Progress towards goals:   Pt engaged in group process quickly. Pt has some developmental delays. Pt mother came to family program weekly with pt. Pt processed high anxiety  Pt referred for Acamprosate and Revia. Pt declined.    Diagnostic Impression:   Alcohol use disorder    Aftercare Plan:  Continuing care for 6 months of group therapy for 1.5 hours group therapy once per week.    Current Medications:  Current Outpatient Medications   Medication Sig Dispense Refill    amphetamine-dextroamphetamine (ADDERALL) 10 MG tablet Take 1.5 tablets by mouth 2 (Two) Times a Day.      busPIRone (BUSPAR) 10 MG tablet Take 1 tablet by mouth 3 (Three) Times a Day.      cloNIDine (CATAPRES) 0.1 MG tablet Take 1 tablet by mouth 2 (Two) Times a Day As Needed. PRN      cyclobenzaprine (FLEXERIL) 10 MG tablet Take 1 tablet by mouth 3 (Three) Times a Day As Needed for Muscle Spasms.      fluticasone (FLONASE) 50 MCG/ACT nasal spray 2 sprays into the nostril(s) as directed by provider Daily.      hydrOXYzine pamoate (VISTARIL) 50 MG capsule Take 1 capsule by mouth 4 (Four) Times a Day As Needed.      hyoscyamine sulfate (ANASPAZ) 0.125 MG tablet dispersible disintegrating tablet Place 125 mcg on the tongue Every 4 (Four) Hours As Needed.      L-Methylfolate-Algae (DEPLIN 7.5 PO) Take 1 capsule by mouth Daily.      mirtazapine (REMERON) 15 MG tablet Take 1 tablet by mouth Every Night.      omeprazole (priLOSEC) 20 MG capsule Take 1 capsule by mouth Daily.      Pramoxine HCl, Perianal, 1 % foam Insert  into the rectum Every 2 (Two) Hours As Needed for Hemorrhoids.      propranolol (INDERAL) 20 MG tablet Take 1 tablet by mouth 3 (Three) Times a Day.      venlafaxine XR (EFFEXOR-XR) 150 MG 24 hr capsule Take 225 mg by mouth Every Morning.       No current facility-administered medications for this  visit.       Referrals/ Appointments :   Pt has therapist and psychiatric provider  Pt stated he would schedule appointments himself.        Polly Rios LCSW      PAST MEDICAL HISTORY:  Asthma

## 2023-08-30 ENCOUNTER — APPOINTMENT (OUTPATIENT)
Dept: PSYCHIATRY | Facility: HOSPITAL | Age: 38
End: 2023-08-30
Payer: COMMERCIAL

## 2023-08-31 ENCOUNTER — APPOINTMENT (OUTPATIENT)
Dept: PSYCHIATRY | Facility: HOSPITAL | Age: 38
End: 2023-08-31
Payer: COMMERCIAL

## 2023-09-11 ENCOUNTER — TELEPHONE (OUTPATIENT)
Dept: ORTHOPEDIC SURGERY | Facility: CLINIC | Age: 38
End: 2023-09-11
Payer: COMMERCIAL

## 2023-09-11 NOTE — TELEPHONE ENCOUNTER
URGENT REFERRAL     INCOMING URGENT SELF REFERRAL -  E/R F/U - Closed left ankle fracture, initial encounter - PN 9.10.23 - XR 9.10.23 - NO PREV SX -     CAN TJS SEE SOON DUE TO MWM OUT?

## 2023-09-12 ENCOUNTER — OFFICE VISIT (OUTPATIENT)
Dept: ORTHOPEDIC SURGERY | Facility: CLINIC | Age: 38
End: 2023-09-12
Payer: COMMERCIAL

## 2023-09-12 VITALS — HEIGHT: 69 IN | WEIGHT: 180.3 LBS | TEMPERATURE: 98.6 F | BODY MASS INDEX: 26.7 KG/M2

## 2023-09-12 DIAGNOSIS — S82.892D CLOSED FRACTURE OF LEFT ANKLE WITH ROUTINE HEALING, SUBSEQUENT ENCOUNTER: ICD-10-CM

## 2023-09-12 DIAGNOSIS — S82.852A TRIMALLEOLAR FRACTURE OF ANKLE, CLOSED, LEFT, INITIAL ENCOUNTER: Primary | ICD-10-CM

## 2023-09-12 DIAGNOSIS — S82.892D CLOSED FRACTURE OF LEFT ANKLE WITH ROUTINE HEALING, SUBSEQUENT ENCOUNTER: Primary | ICD-10-CM

## 2023-09-12 DIAGNOSIS — S82.892A CLOSED FRACTURE OF LEFT ANKLE, INITIAL ENCOUNTER: ICD-10-CM

## 2023-09-12 RX ORDER — CYCLOBENZAPRINE HCL 10 MG
1 TABLET ORAL 3 TIMES DAILY PRN
COMMUNITY
Start: 2023-03-09 | End: 2023-09-14

## 2023-09-12 RX ORDER — MIRTAZAPINE 30 MG/1
30 TABLET, FILM COATED ORAL DAILY
COMMUNITY

## 2023-09-12 RX ORDER — HYOSCYAMINE SULFATE 0.125 MG
TABLET ORAL
COMMUNITY
Start: 2023-03-26 | End: 2023-09-14

## 2023-09-12 RX ORDER — HYDROCODONE BITARTRATE AND ACETAMINOPHEN 5; 325 MG/1; MG/1
1 TABLET ORAL EVERY 6 HOURS PRN
Qty: 30 TABLET | Refills: 0 | Status: SHIPPED | OUTPATIENT
Start: 2023-09-12 | End: 2023-09-14

## 2023-09-12 RX ORDER — ONDANSETRON 8 MG/1
TABLET, ORALLY DISINTEGRATING ORAL
COMMUNITY
Start: 2023-05-26 | End: 2023-09-14

## 2023-09-12 RX ORDER — DEXTROAMPHETAMINE SACCHARATE, AMPHETAMINE ASPARTATE, DEXTROAMPHETAMINE SULFATE AND AMPHETAMINE SULFATE 5; 5; 5; 5 MG/1; MG/1; MG/1; MG/1
20 TABLET ORAL 2 TIMES DAILY
COMMUNITY
Start: 2023-08-25

## 2023-09-12 RX ORDER — DEXTROAMPHETAMINE SACCHARATE, AMPHETAMINE ASPARTATE, DEXTROAMPHETAMINE SULFATE AND AMPHETAMINE SULFATE 3.75; 3.75; 3.75; 3.75 MG/1; MG/1; MG/1; MG/1
TABLET ORAL
COMMUNITY
End: 2023-09-14

## 2023-09-12 RX ORDER — HYDROXYZINE 50 MG/1
50 TABLET, FILM COATED ORAL 3 TIMES DAILY PRN
COMMUNITY
Start: 2023-08-17

## 2023-09-12 RX ORDER — DICYCLOMINE HCL 20 MG
20 TABLET ORAL EVERY 6 HOURS
COMMUNITY

## 2023-09-12 RX ORDER — VENLAFAXINE HYDROCHLORIDE 75 MG/1
75 CAPSULE, EXTENDED RELEASE ORAL EVERY MORNING
COMMUNITY
Start: 2023-08-20

## 2023-09-12 RX ORDER — LEVOMEFOLATE/ALGAL OIL 15-90.314
CAPSULE ORAL
COMMUNITY
End: 2023-09-14

## 2023-09-12 RX ORDER — NALTREXONE HYDROCHLORIDE 50 MG/1
TABLET, FILM COATED ORAL
COMMUNITY
Start: 2023-03-26 | End: 2023-09-14

## 2023-09-12 RX ORDER — VENLAFAXINE HYDROCHLORIDE 150 MG/1
150 CAPSULE, EXTENDED RELEASE ORAL DAILY
COMMUNITY
End: 2023-09-14

## 2023-09-12 RX ORDER — DEXMETHYLPHENIDATE HYDROCHLORIDE 10 MG/1
TABLET ORAL
COMMUNITY
Start: 2023-03-26 | End: 2023-09-14

## 2023-09-12 NOTE — PROGRESS NOTES
"Patient ID: Mane Gunderson     Chief Complaint:    Chief Complaint   Patient presents with    Left Ankle - Pain, Initial Evaluation        HPI:    Mane Gunderson is a 38 y.o. who presents today for evaluation of a left ankle injury.  He has had difficulty bearing weight on the ankle since the injury.  There has been significant swelling and bruising of the ankle. Symptoms are worsened by weightbearing. Symptoms are better with rest. Denies other MSK injuries or pain. They were seen in the ED or an ICC and referred here for definitive treatment.  Splint and crutches.  Instructed to follow-up with orthopedics.  States he has been elevating some but could do better.  Was not having issues before his fall on the 10th of this month.    Patient states he did have some liver issues but has since been sober for 100 days.  States he does use a vape pen with nicotine.  Denies any other significant issues    Social History     Socioeconomic History    Marital status: Single   Tobacco Use    Smoking status: Every Day     Packs/day: 1.00     Types: Electronic Cigarette, Cigarettes    Smokeless tobacco: Never   Vaping Use    Vaping Use: Every day    Substances: Nicotine, Flavoring    Devices: Refillable tank   Substance and Sexual Activity    Alcohol use: Yes     Comment: ONE PINT DAILY    Drug use: No    Sexual activity: Defer     Past Medical History:   Diagnosis Date    ADD (attention deficit disorder)     Alcohol abuse     Dyslexia     Pancreatitis     Posttraumatic stress disorder      Family History   Problem Relation Age of Onset    Obesity Other        ROS:  Constitutional:  Denies fever, shaking or chills     All other pertinent positives and negatives as noted above in HPI.        Physical Exam:     Vital Signs:  Temp 98.6 °F (37 °C) (Temporal)   Ht 175.3 cm (69.02\")   Wt 81.8 kg (180 lb 4.8 oz)   BMI 26.61 kg/m²   Constitutional: Awake alert and oriented x3, well developed, well nourished, no acute distress, " non-toxic appearance.          Exam of the  left  ankle:  A splint is in place which is well-fitting without signs of abrasions or loosening  No atrophy or gross deformity are appreciated through the splint  + tenderness over the lateral malleolus and medial malleolus  Unable to assess strength in ankle dorsiflexion, plantarflexion, inversion, and eversion due to pain and splint  Sensation grossly intact to light touch throughout  Skin intact  Palpable distal pulses              Diagnostic Studies:     Recent x-rays were reviewed showing trimalleolar ankle fracture with displacement of the fibula, posterior mall and medial malleolus.          Assessment:      Diagnosis Plan   1. Trimalleolar fracture of ankle, closed, left, initial encounter  Case Request    ethyl alcohol 62 % 2 each    ceFAZolin (ANCEF) 2 g in sodium chloride 0.9 % 100 mL IVPB    Case Request                  Plan:      I have talked with the patient about the nature of ankle fractures, and we have gone over the X-ray findings.  We will plan CT for further surgical evaluation/planning.  This fracture has significant displacement and will require surgery.  I have discussed weight bearing restrictions with the patient, as well as the importance of ice and elevation for control of swelling.     The spectrum of treatment options were discussed with the patient in detail including both the nonoperative and operative treatment modalities and their respective risks and benefits.  After thorough discussion, the patient has elected to undergo surgical treatment.  The details of the surgical procedure were explained including the location of probable incisions and a description of the likely implants to be used.  Models and diagrams were used as educational resources. The patient understands the likely convalescence after surgery, as well as the rehabilitation required.  We thoroughly discussed the risks, benefits, and alternatives to surgery.  The risks  include but are not limited to the risk of infection, joint stiffness, blood clots (including DVT and/or pulmonary embolus along with the risk of death), neurologic and/or vascular injury, fracture, dislocation, nonunion, malunion, need for further surgery including hardware failure requiring revision, and continued pain.  It was explained that if tissue has been repaired or reconstructed, there is also a chance of failure which may require further management.  In addition, we have discussed the risks, including the risk of disease transmission, associated with any allograft tissue which may be utilized.  Following the completion of the discussion, the patient expressed understanding of this planned course of care, all their questions were answered and consent will be obtained preoperatively.    Plan for surgical intervention consisting of left ankle open reduction with internal fixation.    Outpatient same-day surgery    She understands and agrees.

## 2023-09-14 ENCOUNTER — TELEPHONE (OUTPATIENT)
Dept: ORTHOPEDIC SURGERY | Facility: CLINIC | Age: 38
End: 2023-09-14
Payer: COMMERCIAL

## 2023-09-14 ENCOUNTER — PRE-ADMISSION TESTING (OUTPATIENT)
Dept: PREADMISSION TESTING | Facility: HOSPITAL | Age: 38
End: 2023-09-14
Payer: COMMERCIAL

## 2023-09-14 ENCOUNTER — PATIENT ROUNDING (BHMG ONLY) (OUTPATIENT)
Dept: ORTHOPEDIC SURGERY | Facility: CLINIC | Age: 38
End: 2023-09-14
Payer: COMMERCIAL

## 2023-09-14 VITALS
HEART RATE: 87 BPM | HEIGHT: 69 IN | TEMPERATURE: 97.4 F | OXYGEN SATURATION: 97 % | BODY MASS INDEX: 26.66 KG/M2 | SYSTOLIC BLOOD PRESSURE: 147 MMHG | WEIGHT: 180 LBS | RESPIRATION RATE: 18 BRPM | DIASTOLIC BLOOD PRESSURE: 92 MMHG

## 2023-09-14 LAB
ANION GAP SERPL CALCULATED.3IONS-SCNC: 13 MMOL/L (ref 5–15)
BUN SERPL-MCNC: 13 MG/DL (ref 6–20)
BUN/CREAT SERPL: 13.3 (ref 7–25)
CALCIUM SPEC-SCNC: 9.9 MG/DL (ref 8.6–10.5)
CHLORIDE SERPL-SCNC: 99 MMOL/L (ref 98–107)
CO2 SERPL-SCNC: 26 MMOL/L (ref 22–29)
CREAT SERPL-MCNC: 0.98 MG/DL (ref 0.76–1.27)
DEPRECATED RDW RBC AUTO: 41.3 FL (ref 37–54)
EGFRCR SERPLBLD CKD-EPI 2021: 101.9 ML/MIN/1.73
ERYTHROCYTE [DISTWIDTH] IN BLOOD BY AUTOMATED COUNT: 12.7 % (ref 12.3–15.4)
GLUCOSE SERPL-MCNC: 83 MG/DL (ref 65–99)
HCT VFR BLD AUTO: 39 % (ref 37.5–51)
HGB BLD-MCNC: 13.4 G/DL (ref 13–17.7)
MCH RBC QN AUTO: 30.5 PG (ref 26.6–33)
MCHC RBC AUTO-ENTMCNC: 34.4 G/DL (ref 31.5–35.7)
MCV RBC AUTO: 88.6 FL (ref 79–97)
PLATELET # BLD AUTO: 318 10*3/MM3 (ref 140–450)
PMV BLD AUTO: 9.6 FL (ref 6–12)
POTASSIUM SERPL-SCNC: 4.2 MMOL/L (ref 3.5–5.2)
RBC # BLD AUTO: 4.4 10*6/MM3 (ref 4.14–5.8)
SODIUM SERPL-SCNC: 138 MMOL/L (ref 136–145)
WBC NRBC COR # BLD: 6.97 10*3/MM3 (ref 3.4–10.8)

## 2023-09-14 PROCEDURE — 85027 COMPLETE CBC AUTOMATED: CPT

## 2023-09-14 PROCEDURE — 36415 COLL VENOUS BLD VENIPUNCTURE: CPT

## 2023-09-14 PROCEDURE — 80048 BASIC METABOLIC PNL TOTAL CA: CPT

## 2023-09-14 RX ORDER — IBUPROFEN 200 MG
200 TABLET ORAL EVERY 6 HOURS PRN
COMMUNITY
End: 2023-09-19 | Stop reason: HOSPADM

## 2023-09-14 RX ORDER — MULTIPLE VITAMINS W/ MINERALS TAB 9MG-400MCG
1 TAB ORAL DAILY
COMMUNITY
End: 2023-09-19 | Stop reason: HOSPADM

## 2023-09-14 RX ORDER — ACETAMINOPHEN 325 MG/1
650 TABLET ORAL EVERY 6 HOURS PRN
COMMUNITY
End: 2023-09-19 | Stop reason: HOSPADM

## 2023-09-14 NOTE — PROGRESS NOTES
A [employer] Message has been sent to the patient for PATIENT ROUNDING with [facility]

## 2023-09-14 NOTE — DISCHARGE INSTRUCTIONS
Take the following medications the morning of surgery:  BUSPAR, HYDROXYZINE, MIRTAZAPINE, VENLAFAXINE, PROPANOLOL, OMEPRAZOLE    HOLD MULTIVITAMIN FROM NOW UNTIL SURGERY  HOLD ADDERALL FOR 48 HOURS PRIOR TO SURGERY      If you are on prescription narcotic pain medication to control your pain you may also take that medication the morning of surgery.    General Instructions:  Do not eat solid food after midnight the night before surgery.  You may drink clear liquids day of surgery but must stop at least one hour before your hospital arrival time.  It is beneficial for you to have a clear drink that contains carbohydrates the day of surgery.  We suggest a 12 to 20 ounce bottle of Gatorade or Powerade for non-diabetic patients or a 12 to 20 ounce bottle of G2 or Powerade Zero for diabetic patients. (Pediatric patients, are not advised to drink a 12 to 20 ounce carbohydrate drink)    Clear liquids are liquids you can see through.  Nothing red in color.     Plain water                               Sports drinks  Sodas                                   Gelatin (Jell-O)  Fruit juices without pulp such as white grape juice and apple juice  Popsicles that contain no fruit or yogurt  Tea or coffee (no cream or milk added)  Gatorade / Powerade  G2 / Powerade Zero    Infants may have breast milk up to four hours before surgery.  Infants drinking formula may drink formula up to six hours before surgery.   Patients who avoid smoking, chewing tobacco and alcohol for 4 weeks prior to surgery have a reduced risk of post-operative complications.  Quit smoking as many days before surgery as you can.  Do not smoke, use chewing tobacco or drink alcohol the day of surgery.   If applicable bring your C-PAP/ BI-PAP machine in with you to preop day of surgery.  Bring any papers given to you in the doctor’s office.  Wear clean comfortable clothes.  Do not wear contact lenses, false eyelashes or make-up.  Bring a case for your glasses.    Bring crutches or walker if applicable.  Remove all piercings.  Leave jewelry and any other valuables at home.  Hair extensions with metal clips must be removed prior to surgery.  The Pre-Admission Testing nurse will instruct you to bring medications if unable to obtain an accurate list in Pre-Admission Testing.        If you were given a blood bank ID arm band remember to bring it with you the day of surgery.    Preventing a Surgical Site Infection:  For 2 to 3 days before surgery, avoid shaving with a razor because the razor can irritate skin and make it easier to develop an infection.    Any areas of open skin can increase the risk of a post-operative wound infection by allowing bacteria to enter and travel throughout the body.  Notify your surgeon if you have any skin wounds / rashes even if it is not near the expected surgical site.  The area will need assessed to determine if surgery should be delayed until it is healed.  The night prior to surgery shower using a fresh bar of anti-bacterial soap (such as Dial) and clean washcloth.  Sleep in a clean bed with clean clothing.  Do not allow pets to sleep with you.  Shower on the morning of surgery using a fresh bar of anti-bacterial soap (such as Dial) and clean washcloth.  Dry with a clean towel and dress in clean clothing.  Ask your surgeon if you will be receiving antibiotics prior to surgery.  Make sure you, your family, and all healthcare providers clean their hands with soap and water or an alcohol based hand  before caring for you or your wound.    Day of surgery:  Your arrival time is approximately two hours before your scheduled surgery time.  Upon arrival, a Pre-op nurse and Anesthesiologist will review your health history, obtain vital signs, and answer questions you may have.  The only belongings needed at this time will be a list of your home medications and if applicable your C-PAP/BI-PAP machine.  A Pre-op nurse will start an IV and you  may receive medication in preparation for surgery, including something to help you relax.     Please be aware that surgery does come with discomfort.  We want to make every effort to control your discomfort so please discuss any uncontrolled symptoms with your nurse.   Your doctor will most likely have prescribed pain medications.      If you are going home after surgery you will receive individualized written care instructions before being discharged.  A responsible adult must drive you to and from the hospital on the day of your surgery and stay with you for 24 hours.  Discharge prescriptions can be filled by the hospital pharmacy during regular pharmacy hours.  If you are having surgery late in the day/evening your prescription may be e-prescribed to your pharmacy.  Please verify your pharmacy hours or chose a 24 hour pharmacy to avoid not having access to your prescription because your pharmacy has closed for the day.    If you are staying overnight following surgery, you will be transported to your hospital room following the recovery period.  [facility] has all private rooms.    If you have any questions please call Pre-Admission Testing at [phone].  Deductibles and co-payments are collected on the day of service. Please be prepared to pay the required co-pay, deductible or deposit on the day of service as defined by your plan.    Call your surgeon immediately if you experience any of the following symptoms:  Sore Throat  Shortness of Breath or difficulty breathing  Cough  Chills  Body soreness or muscle pain  Headache  Fever  New loss of taste or smell  Do not arrive for your surgery ill.  Your procedure will need to be rescheduled to another time.  You will need to call your physician before the day of surgery to avoid any unnecessary exposure to hospital staff as well as other patients.

## 2023-09-19 ENCOUNTER — HOSPITAL ENCOUNTER (OUTPATIENT)
Facility: HOSPITAL | Age: 38
Setting detail: HOSPITAL OUTPATIENT SURGERY
Discharge: HOME | End: 2023-09-19
Attending: ORTHOPAEDIC SURGERY | Admitting: ORTHOPAEDIC SURGERY

## 2023-09-19 ENCOUNTER — ANESTHESIA EVENT (OUTPATIENT)
Dept: PERIOP | Facility: HOSPITAL | Age: 38
End: 2023-09-19
Payer: COMMERCIAL

## 2023-09-19 ENCOUNTER — ANESTHESIA (OUTPATIENT)
Dept: PERIOP | Facility: HOSPITAL | Age: 38
End: 2023-09-19
Payer: COMMERCIAL

## 2023-09-19 ENCOUNTER — APPOINTMENT (OUTPATIENT)
Dept: GENERAL RADIOLOGY | Facility: HOSPITAL | Age: 38
End: 2023-09-19

## 2023-09-19 VITALS
DIASTOLIC BLOOD PRESSURE: 84 MMHG | RESPIRATION RATE: 18 BRPM | SYSTOLIC BLOOD PRESSURE: 118 MMHG | HEART RATE: 78 BPM | TEMPERATURE: 97.3 F | OXYGEN SATURATION: 97 %

## 2023-09-19 DIAGNOSIS — S82.852A TRIMALLEOLAR FRACTURE OF ANKLE, CLOSED, LEFT, INITIAL ENCOUNTER: Primary | ICD-10-CM

## 2023-09-19 PROCEDURE — C1769 GUIDE WIRE: HCPCS | Performed by: ORTHOPAEDIC SURGERY

## 2023-09-19 PROCEDURE — C1713 ANCHOR/SCREW BN/BN,TIS/BN: HCPCS | Performed by: ORTHOPAEDIC SURGERY

## 2023-09-19 PROCEDURE — 25010000002 ONDANSETRON PER 1 MG: Performed by: ANESTHESIOLOGY

## 2023-09-19 PROCEDURE — 25010000002 CEFAZOLIN IN DEXTROSE 2-4 GM/100ML-% SOLUTION: Performed by: ORTHOPAEDIC SURGERY

## 2023-09-19 PROCEDURE — 25010000002 DEXAMETHASONE PER 1 MG: Performed by: ANESTHESIOLOGY

## 2023-09-19 PROCEDURE — 27822 TREATMENT OF ANKLE FRACTURE: CPT | Performed by: ORTHOPAEDIC SURGERY

## 2023-09-19 PROCEDURE — 25010000002 FENTANYL CITRATE (PF) 50 MCG/ML SOLUTION: Performed by: ANESTHESIOLOGY

## 2023-09-19 PROCEDURE — 25010000002 MIDAZOLAM PER 1 MG: Performed by: ANESTHESIOLOGY

## 2023-09-19 PROCEDURE — 76000 FLUOROSCOPY <1 HR PHYS/QHP: CPT

## 2023-09-19 PROCEDURE — 25010000002 ROPIVACAINE PER 1 MG: Performed by: ANESTHESIOLOGY

## 2023-09-19 PROCEDURE — S0260 H&P FOR SURGERY: HCPCS | Performed by: ORTHOPAEDIC SURGERY

## 2023-09-19 PROCEDURE — 25010000002 PHENYLEPHRINE 10 MG/ML SOLUTION: Performed by: ANESTHESIOLOGY

## 2023-09-19 PROCEDURE — 73610 X-RAY EXAM OF ANKLE: CPT

## 2023-09-19 PROCEDURE — 25010000002 PROPOFOL 10 MG/ML EMULSION: Performed by: ANESTHESIOLOGY

## 2023-09-19 DEVICE — K-LESS T-ROPE W/DRV, SYN REPR, TI
Type: IMPLANTABLE DEVICE | Site: ANKLE | Status: FUNCTIONAL
Brand: ARTHREX®

## 2023-09-19 DEVICE — SCRW LP NL TI 3.5X16MM: Type: IMPLANTABLE DEVICE | Site: ANKLE | Status: FUNCTIONAL

## 2023-09-19 DEVICE — SCRW COMPR KREULOCK VA LK FUL/THRD TI 3X14MM: Type: IMPLANTABLE DEVICE | Site: ANKLE | Status: FUNCTIONAL

## 2023-09-19 DEVICE — PLT FX FIB DIST INTERNALBRACE LK TI 6HL LT: Type: IMPLANTABLE DEVICE | Site: ANKLE | Status: FUNCTIONAL

## 2023-09-19 DEVICE — SCRW LP NL TI 3.5X14MM: Type: IMPLANTABLE DEVICE | Site: ANKLE | Status: FUNCTIONAL

## 2023-09-19 DEVICE — SCRW CORT FT/ANKL L/P TI 3X16MM: Type: IMPLANTABLE DEVICE | Site: ANKLE | Status: FUNCTIONAL

## 2023-09-19 DEVICE — IMPLANTABLE DEVICE: Type: IMPLANTABLE DEVICE | Site: ANKLE | Status: FUNCTIONAL

## 2023-09-19 DEVICE — SCRW COMPR KREULOCK LK FUL/THRD TI 3.5X14MM: Type: IMPLANTABLE DEVICE | Site: ANKLE | Status: FUNCTIONAL

## 2023-09-19 DEVICE — SCRW COMPR KREULOCK VA LK FUL/THRD TI 3X12MM: Type: IMPLANTABLE DEVICE | Site: ANKLE | Status: FUNCTIONAL

## 2023-09-19 RX ORDER — ROPIVACAINE HYDROCHLORIDE 5 MG/ML
INJECTION, SOLUTION EPIDURAL; INFILTRATION; PERINEURAL
Status: COMPLETED | OUTPATIENT
Start: 2023-09-19 | End: 2023-09-19

## 2023-09-19 RX ORDER — FENTANYL CITRATE 50 UG/ML
100 INJECTION, SOLUTION INTRAMUSCULAR; INTRAVENOUS
Status: DISCONTINUED | OUTPATIENT
Start: 2023-09-19 | End: 2023-09-19 | Stop reason: HOSPADM

## 2023-09-19 RX ORDER — FENTANYL CITRATE 50 UG/ML
INJECTION, SOLUTION INTRAMUSCULAR; INTRAVENOUS AS NEEDED
Status: DISCONTINUED | OUTPATIENT
Start: 2023-09-19 | End: 2023-09-19 | Stop reason: SURG

## 2023-09-19 RX ORDER — PROMETHAZINE HYDROCHLORIDE 25 MG/1
25 SUPPOSITORY RECTAL ONCE AS NEEDED
Status: DISCONTINUED | OUTPATIENT
Start: 2023-09-19 | End: 2023-09-19 | Stop reason: HOSPADM

## 2023-09-19 RX ORDER — PROPOFOL 10 MG/ML
VIAL (ML) INTRAVENOUS AS NEEDED
Status: DISCONTINUED | OUTPATIENT
Start: 2023-09-19 | End: 2023-09-19 | Stop reason: SURG

## 2023-09-19 RX ORDER — HYDROCODONE BITARTRATE AND ACETAMINOPHEN 5; 325 MG/1; MG/1
1-2 TABLET ORAL EVERY 4 HOURS PRN
Qty: 42 TABLET | Refills: 0 | Status: SHIPPED | OUTPATIENT
Start: 2023-09-19

## 2023-09-19 RX ORDER — HYDRALAZINE HYDROCHLORIDE 20 MG/ML
5 INJECTION INTRAMUSCULAR; INTRAVENOUS
Status: DISCONTINUED | OUTPATIENT
Start: 2023-09-19 | End: 2023-09-19 | Stop reason: HOSPADM

## 2023-09-19 RX ORDER — OXYCODONE AND ACETAMINOPHEN 7.5; 325 MG/1; MG/1
1 TABLET ORAL EVERY 4 HOURS PRN
Status: DISCONTINUED | OUTPATIENT
Start: 2023-09-19 | End: 2023-09-19 | Stop reason: HOSPADM

## 2023-09-19 RX ORDER — DROPERIDOL 2.5 MG/ML
0.62 INJECTION, SOLUTION INTRAMUSCULAR; INTRAVENOUS
Status: DISCONTINUED | OUTPATIENT
Start: 2023-09-19 | End: 2023-09-19 | Stop reason: HOSPADM

## 2023-09-19 RX ORDER — SODIUM CHLORIDE, SODIUM LACTATE, POTASSIUM CHLORIDE, CALCIUM CHLORIDE 600; 310; 30; 20 MG/100ML; MG/100ML; MG/100ML; MG/100ML
INJECTION, SOLUTION INTRAVENOUS CONTINUOUS PRN
Status: DISCONTINUED | OUTPATIENT
Start: 2023-09-19 | End: 2023-09-19 | Stop reason: SURG

## 2023-09-19 RX ORDER — NALOXONE HYDROCHLORIDE 4 MG/.1ML
SPRAY NASAL
Qty: 2 EACH | Refills: 0 | Status: SHIPPED | OUTPATIENT
Start: 2023-09-19

## 2023-09-19 RX ORDER — PROMETHAZINE HYDROCHLORIDE 25 MG/1
25 TABLET ORAL ONCE AS NEEDED
Status: DISCONTINUED | OUTPATIENT
Start: 2023-09-19 | End: 2023-09-19 | Stop reason: HOSPADM

## 2023-09-19 RX ORDER — LABETALOL HYDROCHLORIDE 5 MG/ML
5 INJECTION, SOLUTION INTRAVENOUS
Status: DISCONTINUED | OUTPATIENT
Start: 2023-09-19 | End: 2023-09-19 | Stop reason: HOSPADM

## 2023-09-19 RX ORDER — LIDOCAINE HYDROCHLORIDE 20 MG/ML
INJECTION, SOLUTION INFILTRATION; PERINEURAL AS NEEDED
Status: DISCONTINUED | OUTPATIENT
Start: 2023-09-19 | End: 2023-09-19 | Stop reason: SURG

## 2023-09-19 RX ORDER — ASPIRIN 81 MG/1
81 TABLET ORAL DAILY
Qty: 45 TABLET | Refills: 0 | Status: SHIPPED | OUTPATIENT
Start: 2023-09-20

## 2023-09-19 RX ORDER — ONDANSETRON 4 MG/1
4 TABLET, FILM COATED ORAL EVERY 8 HOURS PRN
Qty: 10 TABLET | Refills: 0 | Status: SHIPPED | OUTPATIENT
Start: 2023-09-19

## 2023-09-19 RX ORDER — EPHEDRINE SULFATE 50 MG/ML
5 INJECTION, SOLUTION INTRAVENOUS ONCE AS NEEDED
Status: DISCONTINUED | OUTPATIENT
Start: 2023-09-19 | End: 2023-09-19 | Stop reason: HOSPADM

## 2023-09-19 RX ORDER — MIDAZOLAM HYDROCHLORIDE 1 MG/ML
2 INJECTION INTRAMUSCULAR; INTRAVENOUS
Status: DISCONTINUED | OUTPATIENT
Start: 2023-09-19 | End: 2023-09-19 | Stop reason: HOSPADM

## 2023-09-19 RX ORDER — FENTANYL CITRATE 50 UG/ML
50 INJECTION, SOLUTION INTRAMUSCULAR; INTRAVENOUS
Status: DISCONTINUED | OUTPATIENT
Start: 2023-09-19 | End: 2023-09-19 | Stop reason: HOSPADM

## 2023-09-19 RX ORDER — MAGNESIUM HYDROXIDE 1200 MG/15ML
LIQUID ORAL AS NEEDED
Status: DISCONTINUED | OUTPATIENT
Start: 2023-09-19 | End: 2023-09-19 | Stop reason: HOSPADM

## 2023-09-19 RX ORDER — ONDANSETRON 2 MG/ML
4 INJECTION INTRAMUSCULAR; INTRAVENOUS ONCE AS NEEDED
Status: DISCONTINUED | OUTPATIENT
Start: 2023-09-19 | End: 2023-09-19 | Stop reason: HOSPADM

## 2023-09-19 RX ORDER — HYDROMORPHONE HYDROCHLORIDE 1 MG/ML
0.5 INJECTION, SOLUTION INTRAMUSCULAR; INTRAVENOUS; SUBCUTANEOUS
Status: DISCONTINUED | OUTPATIENT
Start: 2023-09-19 | End: 2023-09-19 | Stop reason: HOSPADM

## 2023-09-19 RX ORDER — SODIUM CHLORIDE 0.9 % (FLUSH) 0.9 %
3-10 SYRINGE (ML) INJECTION AS NEEDED
Status: DISCONTINUED | OUTPATIENT
Start: 2023-09-19 | End: 2023-09-19 | Stop reason: HOSPADM

## 2023-09-19 RX ORDER — PHENYLEPHRINE HYDROCHLORIDE 10 MG/ML
INJECTION INTRAVENOUS AS NEEDED
Status: DISCONTINUED | OUTPATIENT
Start: 2023-09-19 | End: 2023-09-19 | Stop reason: SURG

## 2023-09-19 RX ORDER — CEFAZOLIN SODIUM 2 G/100ML
2 INJECTION, SOLUTION INTRAVENOUS ONCE
Status: COMPLETED | OUTPATIENT
Start: 2023-09-19 | End: 2023-09-19

## 2023-09-19 RX ORDER — SODIUM CHLORIDE 0.9 % (FLUSH) 0.9 %
3 SYRINGE (ML) INJECTION EVERY 12 HOURS SCHEDULED
Status: DISCONTINUED | OUTPATIENT
Start: 2023-09-19 | End: 2023-09-19 | Stop reason: HOSPADM

## 2023-09-19 RX ORDER — ONDANSETRON 2 MG/ML
INJECTION INTRAMUSCULAR; INTRAVENOUS AS NEEDED
Status: DISCONTINUED | OUTPATIENT
Start: 2023-09-19 | End: 2023-09-19 | Stop reason: SURG

## 2023-09-19 RX ORDER — IPRATROPIUM BROMIDE AND ALBUTEROL SULFATE 2.5; .5 MG/3ML; MG/3ML
3 SOLUTION RESPIRATORY (INHALATION) ONCE AS NEEDED
Status: DISCONTINUED | OUTPATIENT
Start: 2023-09-19 | End: 2023-09-19 | Stop reason: HOSPADM

## 2023-09-19 RX ORDER — EPHEDRINE SULFATE 50 MG/ML
INJECTION, SOLUTION INTRAVENOUS AS NEEDED
Status: DISCONTINUED | OUTPATIENT
Start: 2023-09-19 | End: 2023-09-19 | Stop reason: SURG

## 2023-09-19 RX ORDER — FLUMAZENIL 0.1 MG/ML
0.2 INJECTION INTRAVENOUS AS NEEDED
Status: DISCONTINUED | OUTPATIENT
Start: 2023-09-19 | End: 2023-09-19 | Stop reason: HOSPADM

## 2023-09-19 RX ORDER — DEXAMETHASONE SODIUM PHOSPHATE 4 MG/ML
INJECTION, SOLUTION INTRA-ARTICULAR; INTRALESIONAL; INTRAMUSCULAR; INTRAVENOUS; SOFT TISSUE
Status: COMPLETED | OUTPATIENT
Start: 2023-09-19 | End: 2023-09-19

## 2023-09-19 RX ORDER — SODIUM CHLORIDE, SODIUM LACTATE, POTASSIUM CHLORIDE, CALCIUM CHLORIDE 600; 310; 30; 20 MG/100ML; MG/100ML; MG/100ML; MG/100ML
9 INJECTION, SOLUTION INTRAVENOUS CONTINUOUS
Status: DISCONTINUED | OUTPATIENT
Start: 2023-09-19 | End: 2023-09-19 | Stop reason: HOSPADM

## 2023-09-19 RX ORDER — HYDROCODONE BITARTRATE AND ACETAMINOPHEN 7.5; 325 MG/1; MG/1
1 TABLET ORAL ONCE AS NEEDED
Status: DISCONTINUED | OUTPATIENT
Start: 2023-09-19 | End: 2023-09-19 | Stop reason: HOSPADM

## 2023-09-19 RX ORDER — DIPHENHYDRAMINE HYDROCHLORIDE 50 MG/ML
12.5 INJECTION INTRAMUSCULAR; INTRAVENOUS
Status: DISCONTINUED | OUTPATIENT
Start: 2023-09-19 | End: 2023-09-19 | Stop reason: HOSPADM

## 2023-09-19 RX ORDER — NALOXONE HCL 0.4 MG/ML
0.2 VIAL (ML) INJECTION AS NEEDED
Status: DISCONTINUED | OUTPATIENT
Start: 2023-09-19 | End: 2023-09-19 | Stop reason: HOSPADM

## 2023-09-19 RX ORDER — DOCUSATE SODIUM 100 MG/1
100 CAPSULE, LIQUID FILLED ORAL 2 TIMES DAILY
Qty: 60 CAPSULE | Refills: 0 | Status: SHIPPED | OUTPATIENT
Start: 2023-09-19

## 2023-09-19 RX ADMIN — EPHEDRINE SULFATE 10 MG: 50 INJECTION INTRAVENOUS at 14:30

## 2023-09-19 RX ADMIN — FENTANYL CITRATE 50 MCG: 50 INJECTION, SOLUTION INTRAMUSCULAR; INTRAVENOUS at 13:17

## 2023-09-19 RX ADMIN — LIDOCAINE HYDROCHLORIDE 60 MG: 20 INJECTION, SOLUTION INFILTRATION; PERINEURAL at 14:10

## 2023-09-19 RX ADMIN — PHENYLEPHRINE HYDROCHLORIDE 200 MCG: 10 INJECTION INTRAVENOUS at 14:28

## 2023-09-19 RX ADMIN — DEXAMETHASONE SODIUM PHOSPHATE 8 MG: 4 INJECTION, SOLUTION INTRA-ARTICULAR; INTRALESIONAL; INTRAMUSCULAR; INTRAVENOUS; SOFT TISSUE at 14:11

## 2023-09-19 RX ADMIN — PHENYLEPHRINE HYDROCHLORIDE 200 MCG: 10 INJECTION INTRAVENOUS at 14:39

## 2023-09-19 RX ADMIN — ROPIVACAINE HYDROCHLORIDE 10 ML: 5 INJECTION, SOLUTION EPIDURAL; INFILTRATION; PERINEURAL at 13:24

## 2023-09-19 RX ADMIN — EPHEDRINE SULFATE 10 MG: 50 INJECTION INTRAVENOUS at 14:41

## 2023-09-19 RX ADMIN — PROPOFOL 160 MG: 10 INJECTION, EMULSION INTRAVENOUS at 14:10

## 2023-09-19 RX ADMIN — CEFAZOLIN SODIUM 2 G: 2 INJECTION, SOLUTION INTRAVENOUS at 13:56

## 2023-09-19 RX ADMIN — ONDANSETRON 4 MG: 2 INJECTION INTRAMUSCULAR; INTRAVENOUS at 14:11

## 2023-09-19 RX ADMIN — SODIUM CHLORIDE, SODIUM LACTATE, POTASSIUM CHLORIDE, AND CALCIUM CHLORIDE: 600; 310; 30; 20 INJECTION, SOLUTION INTRAVENOUS at 14:02

## 2023-09-19 RX ADMIN — MIDAZOLAM 2 MG: 1 INJECTION INTRAMUSCULAR; INTRAVENOUS at 13:16

## 2023-09-19 RX ADMIN — DEXAMETHASONE SODIUM PHOSPHATE 4 MG: 4 INJECTION, SOLUTION INTRA-ARTICULAR; INTRALESIONAL; INTRAMUSCULAR; INTRAVENOUS; SOFT TISSUE at 13:29

## 2023-09-19 RX ADMIN — FENTANYL CITRATE 50 MCG: 50 INJECTION, SOLUTION INTRAMUSCULAR; INTRAVENOUS at 16:23

## 2023-09-19 RX ADMIN — SODIUM CHLORIDE, POTASSIUM CHLORIDE, SODIUM LACTATE AND CALCIUM CHLORIDE 9 ML/HR: 600; 310; 30; 20 INJECTION, SOLUTION INTRAVENOUS at 12:57

## 2023-09-19 RX ADMIN — ROPIVACAINE HYDROCHLORIDE 20 ML: 5 INJECTION EPIDURAL; INFILTRATION; PERINEURAL at 13:29

## 2023-09-19 NOTE — ANESTHESIA PROCEDURE NOTES
Peripheral Block    Pre-sedation assessment completed: 9/19/2023 1:20 PM    Patient reassessed immediately prior to procedure    Patient location during procedure: pre-op  Start time: 9/19/2023 1:20 PM  Stop time: 9/19/2023 1:24 PM  Reason for block: at surgeon's request and post-op pain management  Performed by  Anesthesiologist: Jonatan Grant MD  Preanesthetic Checklist  Completed: patient identified, IV checked, site marked, risks and benefits discussed, surgical consent, monitors and equipment checked, pre-op evaluation and timeout performed  Prep:  Pt Position: supine  Sterile barriers:cap, gloves, mask and sterile barriers  Prep: ChloraPrep  Patient monitoring: blood pressure monitoring, continuous pulse oximetry and EKG  Procedure    Sedation: yes  Performed under: local infiltration  Guidance:ultrasound guided    ULTRASOUND INTERPRETATION.  Using ultrasound guidance a 22 G gauge needle was placed in close proximity to the nerve, at which point, under ultrasound guidance anesthetic was injected in the area of the nerve and spread of the anesthesia was seen on ultrasound in close proximity thereto.  There were no abnormalities seen on ultrasound; a digital image was taken; and the patient tolerated the procedure with no complications. Images:still images obtained, printed/placed on chart (U/S to localize the nerve)    Laterality:left  Block Type:adductor canal block  Injection Technique:single-shot  Needle Type:echogenic  Needle Gauge:22 G  Resistance on Injection: less than 15 psi    Medications Used: ropivacaine (NAROPIN) 0.5 % injection - Injection   10 mL - 9/19/2023 1:24:00 PM      Post Assessment  Injection Assessment: negative aspiration for heme, no paresthesia on injection and incremental injection  Patient Tolerance:comfortable throughout block  Complications:no

## 2023-09-19 NOTE — ANESTHESIA PREPROCEDURE EVALUATION
Anesthesia Evaluation                  Airway   Mallampati: II  TM distance: >3 FB  Neck ROM: full  no difficulty expected  Dental - normal exam     Pulmonary - normal exam   Cardiovascular - normal exam        Neuro/Psych  (+) headaches, psychiatric history  GI/Hepatic/Renal/Endo    (+) hepatitis, liver disease    Musculoskeletal     Abdominal    Substance History      OB/GYN          Other        ROS/Med Hx Other: Hx etoh abuse                Anesthesia Plan    ASA 3     general with block     (---------------------------               09/19/23                      1201         ---------------------------   BP:          174/92         Pulse:         75           Resp:          16           Temp:   36.4 °C (97.6 °F)   SpO2:          97%         ---------------------------)  intravenous induction     Anesthetic plan, risks, benefits, and alternatives have been provided, discussed and informed consent has been obtained with: patient.    CODE STATUS:

## 2023-09-19 NOTE — OP NOTE
09/19/23    Pre-Operative Diagnosis:  Left trimalleolar ankle fracture    Post-Operative Diagnosis: Left trimalleolar ankle fracture     Procedure Performed:  Open reduction, internal fixation of left trimalleolar ankle fracture.    Surgeon: Otto Tomas MD     Assistant: Marcus Minaya MD    Anesthesia: Regional followed by Gen.    Complications: None.     Estimated Blood Loss: Less than 50 mL.     Specimens: * No orders in the log *    Implants: Arthrex lateral distal fibular locking plate with multiple locking and nonlocking screws  Implant Name Type Inv. Item Serial No.  Lot No. LRB No. Used Action   SCRW AVELINA FT/ANKL L/P TI 3X16MM - [id number] Implant SCRW AVELINA FT/ANKL L/P TI 3X16MM  [employer]  Left 1 Implanted   PLT FX FIB DIST INTERNALBRACE LK TI 6HL LT - [id number] Implant PLT FX FIB DIST INTERNALBRACE LK TI 6HL LT  [employer]  Left 1 Implanted   SCRW LP NL TI 3.5X14MM - [id number] Implant SCRW LP NL TI 3.5X14MM  [employer]  Left 2 Implanted   SYS SYNDESMOSIS REPR ANKL TIGHTROPE/XP KNOTLS TI - [id number] Implant SYS SYNDESMOSIS REPR ANKL TIGHTROPE/XP KNOTLS TI  [employer] [id number] Left 1 Implanted   SCRW COMPR KREULOCK VA LK FUL/THRD TI 3X12MM - [id number] Implant SCRW COMPR KREULOCK VA LK FUL/THRD TI 3X12MM  [employer]  Left 1 Implanted   SCRW COMPR KREULOCK VA LK FUL/THRD TI 3X14MM - [id number] Implant SCRW COMPR KREULOCK VA LK FUL/THRD TI 3X14MM  [employer]  Left 3 Implanted   SCRW LP NL TI 3.5X16MM - [id number] Implant SCRW LP NL TI 3.5X16MM  [employer]  Left 1 Implanted   SCRW COMPR KREULOCK LK FUL/THRD TI 3.5X14MM - [id number] Implant SCRW COMPR KREULOCK LK FUL/THRD TI 3.5X14MM  [employer]  Left 1 Implanted   SCRW LP NL TI 3.5X16MM - [id number] Implant SCRW LP NL TI 3.5X16MM  [employer]  Left 1 Implanted   SCRW COMPR KREULOCK LK FUL/THRD TI 3.5X14MM - [id number] Implant SCRW COMPR KREULOCK LK FUL/THRD TI 3.5X14MM  [employer]  Left 1 Implanted   4 x 46 PT cannulated crew      Left 1 Implanted       Brief  Operative Indication: Pleasant 38-year-old male sustained an unstable ankle fracture in a fall.  The risks, benefits, and alternatives to open reduction, internal fixation were carefully discussed in detail.  I explained that surgical risks include infection, hematoma, nonunion, malunion, failure of fixation persistent pain, loss of motion, iatrogenic nerve and/or blood vessel injury resulting in permanent weakness, numbness or dysfunction (particularly the superficial branch of the peroneal nerve and saphenous nerves), DVT, PE, positioning related neuropraxia, and anesthesia related complications resulting in death.   The patient consented to proceed.    Description of Procedure in Detail:  The patient and operative site were identified in the preoperative holding area.  Adequate regional anesthesia of the left ankle was administered.  The surgical site was marked.  Preoperative antibiotics were administered.  The patient was then taken to the operating room where adequate monitored anesthesia care was administered.  The patient was repositioned on the operating table.  The left lower extremity was prepped and draped in the standard sterile fashion.  I cleaned the extremity with an alcohol solution.  The extremity was then prepped with Hibiclens followed by 2 ChloraPreps.  I allowed the ChloraPreps to dry for 3 minutes before the draping procedure was carried out.     A timeout was taken prior to surgical incision.  I began the procedure by addressing the lateral side.  An approximately 8 cm incision was fashioned over the lateral aspect of the ankle, centered over the fracture site.  I incised the skin only and then carefully dissected down to the fracture.  The superficial branch of the peroneal nerve was dissected out and retracted anteriorly.  This structure was kept protected throughout the duration of the case.  The fracture site was exposed and the fracture hematoma evacuated.  This was a Saldivar B type  fracture and I was able to directly interdigitate the 2 main fragments for an anatomic reduction.  This was clamped reduced and then secured with a lag screw which was drilled, measured and placed from anterior to posterior in the typical fashion.  This screw seated well and got good fixation.  This seemed to secure the reduction very nicely and the clamp was removed.    Next an appropriate length distal fibular locking plate was applied.  This was secured both proximally and distally with multiple locking and nonlocking screws.  The nonlocking screws got excellent purchase in the bone and all the locking screws were confirmed to lock into the plate.  I was satisfied that I had good fixation and that the lateral malleolar fracture was anatomically reduced.  The medial side remained malreduced.       I then directed my attention to fixation of the medial side.  Incision was made directly over the medial malleolus after skin incision careful dissection was taken down to avoid the saphenous vein in nerve.  Fracture was a identified cleaned and reduced.  Provisional K wire was placed x-rays were checked for proper reduction and placement of the K wire.  I then overdrilled with a cannulated drill over the K wire.  A 46 mm partially-threaded cannulated screw was then placed.    At this point I checked the syndesmosis.  This was some widening and with the presence of the posterior malleolus fracture it was felt additional fixation syndesmosis was needed.  Tight rope was placed and tightened down.  I then retested the stability of the ankle and is found to be well reduced with no widening of the medial clear space.       The posterior malleolar fracture was fairly small and the talus was symmetrically reduced under the plafond.  Furthermore, the posterior malleolar fracture seemed to be reduced into a good position.  I determined that fixation of the posterior malleolus was unnecessary.  Final images were taken and saved.      The wounds were copiously irrigated out with sterile saline and closed in a layered fashion using Vicryl for the deep tissues and nylon for the skin.  Sterile dressings were applied to the wounds followed by a splint (well a heal pad and appropriate padding to all bony prominences) and then the drapes withdrawn.  The patient was awakened and transferred to the recovery room.  The patient tolerated the procedure well.  There were no complications.       Otto Tomas MD  09/19/23

## 2023-09-19 NOTE — ANESTHESIA PROCEDURE NOTES
Peripheral Block    Pre-sedation assessment completed: 9/19/2023 1:24 PM    Patient reassessed immediately prior to procedure    Patient location during procedure: pre-op  Start time: 9/19/2023 1:24 PM  Stop time: 9/19/2023 1:29 PM  Reason for block: at surgeon's request and post-op pain management  Performed by  Anesthesiologist: Jonatan Grant MD  Preanesthetic Checklist  Completed: patient identified, IV checked, site marked, risks and benefits discussed, surgical consent, monitors and equipment checked, pre-op evaluation and timeout performed  Prep:  Pt Position: supine  Sterile barriers:cap, gloves, mask and sterile barriers  Prep: ChloraPrep  Patient monitoring: blood pressure monitoring, continuous pulse oximetry and EKG  Procedure    Sedation: yes  Performed under: local infiltration  Guidance:ultrasound guided    ULTRASOUND INTERPRETATION.  Using ultrasound guidance a 22 G gauge needle was placed in close proximity to the sciatic nerve, at which point, under ultrasound guidance anesthetic was injected in the area of the nerve and spread of the anesthesia was seen on ultrasound in close proximity thereto.  There were no abnormalities seen on ultrasound; a digital image was taken; and the patient tolerated the procedure with no complications. Images:still images obtained, printed/placed on chart (U/S used to localize the nerve)    Laterality:left  Block Type:popliteal and sciatic  Injection Technique:single-shot  Needle Type:echogenic  Needle Gauge:22 G  Resistance on Injection: less than 15 psi    Medications Used: dexamethasone (DECADRON) injection - Injection   4 mg - 9/19/2023 1:29:00 PM  ropivacaine (NAROPIN) 0.5 % injection - Injection   20 mL - 9/19/2023 1:29:00 PM      Post Assessment  Injection Assessment: negative aspiration for heme, no paresthesia on injection and incremental injection  Patient Tolerance:comfortable throughout block  Complications:no  Additional Notes

## 2023-09-19 NOTE — ANESTHESIA POSTPROCEDURE EVALUATION
Patient: Mane Gunderson    Procedure Summary       Date: 09/19/23 Room / Location:  [facility]  /  [facility]    Anesthesia Start: 1400 Anesthesia Stop: 1644    Procedure: left ANKLE OPEN REDUCTION INTERNAL FIXATION (Left: Ankle) Diagnosis:       Trimalleolar fracture of ankle, closed, left, initial encounter      (Trimalleolar fracture of ankle, closed, left, initial encounter [S82.852A])    Surgeons: Otto Tomas MD Provider: Víctor Golden MD    Anesthesia Type: general with block ASA Status: 3            Anesthesia Type: general with block    Vitals  Vitals Value Taken Time   /87 09/19/23 1700   Temp 36.3 °C (97.3 °F) 09/19/23 1700   Pulse 77 09/19/23 1704   Resp 16 09/19/23 1700   SpO2 98 % 09/19/23 1704   Vitals shown include unvalidated device data.        Post Anesthesia Care and Evaluation    Patient location during evaluation: PACU  Patient participation: complete - patient participated  Level of consciousness: awake and alert  Pain management: adequate    Airway patency: patent  Anesthetic complications: No anesthetic complications  PONV Status: controlled  Cardiovascular status: acceptable and hemodynamically stable  Respiratory status: acceptable  Hydration status: acceptable    Comments: /84 (BP Location: Right arm, Patient Position: Sitting)   Pulse 78   Temp 36.3 °C (97.3 °F) (Oral)   Resp 18   SpO2 97%

## 2023-09-19 NOTE — H&P
Patient ID: Mane Gunderson     Chief Complaint:    No chief complaint on file.       HPI:    Mane Gunderson is a 38 y.o. who presents today for evaluation of a left ankle injury.  He has had difficulty bearing weight on the ankle since the injury.  There has been significant swelling and bruising of the ankle. Symptoms are worsened by weightbearing. Symptoms are better with rest. Denies other MSK injuries or pain. They were seen in the ED or an ICC and referred here for definitive treatment.  Splint and crutches.  Instructed to follow-up with orthopedics.  States he has been elevating some but could do better.  Was not having issues before his fall on the 10th of this month.    Patient states he did have some liver issues but has since been sober for 100 days.  States he does use a vape pen with nicotine.  Denies any other significant issues    Social History     Socioeconomic History    Marital status: Single   Tobacco Use    Smoking status: Every Day     Types: Electronic Cigarette    Smokeless tobacco: Never   Vaping Use    Vaping Use: Every day    Substances: Nicotine, Flavoring, HAS HAD CBD GUMMIES IN LAST MONTH AND TRIED DELTA 9 IN VAPE    Devices: Refillable tank   Substance and Sexual Activity    Alcohol use: Not Currently     Comment: SOBER FROM ALCOHOL SINCE 6-6-23    Drug use: No    Sexual activity: Defer     Past Medical History:   Diagnosis Date    ADD (attention deficit disorder)     Alcohol abuse     RECENT REHAB    Alcoholic hepatitis     Anesthesia complication     HIGH TOLERANCE TO MEDICATIONS, AWAKES EASILY, HARD TO SEDATE, WAKES UP CONFUSED OFTEN    Ankle fracture     Anxiety and depression     Dyslexia     Enlarged liver     Enlarged pancreas     Genetic disorder     UNKNOWN BUT PATIENT STATES DISORDER CAUSES MEDICATIONS TO NOT WORK EASILY AND CAUSES ANXIETY AND SIGNS OF PSTD    History of seizure     RELATED TO LIVER HEPATITIS    IBS (irritable bowel syndrome)     Pancreatitis, alcoholic,  acute     DEPENDING ON ALCOHOL USE    Panic disorder     Posttraumatic stress disorder     S/P alcohol detoxification     Toxic liver disease with acute hepatitis      Family History   Problem Relation Age of Onset    Obesity Other     Malig Hyperthermia Neg Hx        ROS:  Constitutional:  Denies fever, shaking or chills     All other pertinent positives and negatives as noted above in HPI.        Physical Exam:     Vital Signs:  /75 (BP Location: Right arm, Patient Position: Lying)   Pulse 74   Temp 97.6 °F (36.4 °C) (Oral)   Resp 16   SpO2 98%   Constitutional: Awake alert and oriented x3, well developed, well nourished, no acute distress, non-toxic appearance.          Exam of the  left  ankle:  A splint is in place which is well-fitting without signs of abrasions or loosening  No atrophy or gross deformity are appreciated through the splint  + tenderness over the lateral malleolus and medial malleolus  Unable to assess strength in ankle dorsiflexion, plantarflexion, inversion, and eversion due to pain and splint  Sensation grossly intact to light touch throughout  Skin intact  Palpable distal pulses              Diagnostic Studies:     Recent x-rays were reviewed showing trimalleolar ankle fracture with displacement of the fibula, posterior mall and medial malleolus.          Assessment:      Diagnosis Plan   1. Trimalleolar fracture of ankle, closed, left, initial encounter  ethyl alcohol 62 % 2 each    ceFAZolin in dextrose (ANCEF) IVPB solution 2 g                  Plan:      I have talked with the patient about the nature of ankle fractures, and we have gone over the X-ray findings.  We will plan CT for further surgical evaluation/planning.  This fracture has significant displacement and will require surgery.  I have discussed weight bearing restrictions with the patient, as well as the importance of ice and elevation for control of swelling.     The spectrum of treatment options were discussed  with the patient in detail including both the nonoperative and operative treatment modalities and their respective risks and benefits.  After thorough discussion, the patient has elected to undergo surgical treatment.  The details of the surgical procedure were explained including the location of probable incisions and a description of the likely implants to be used.  Models and diagrams were used as educational resources. The patient understands the likely convalescence after surgery, as well as the rehabilitation required.  We thoroughly discussed the risks, benefits, and alternatives to surgery.  The risks include but are not limited to the risk of infection, joint stiffness, blood clots (including DVT and/or pulmonary embolus along with the risk of death), neurologic and/or vascular injury, fracture, dislocation, nonunion, malunion, need for further surgery including hardware failure requiring revision, and continued pain.  It was explained that if tissue has been repaired or reconstructed, there is also a chance of failure which may require further management.  In addition, we have discussed the risks, including the risk of disease transmission, associated with any allograft tissue which may be utilized.  Following the completion of the discussion, the patient expressed understanding of this planned course of care, all their questions were answered and consent will be obtained preoperatively.    Plan for surgical intervention consisting of left ankle open reduction with internal fixation.    Outpatient same-day surgery    he understands and agrees.      This note has been copied and pasted from most recent office visit.  No changes to the above.    Patient wishes to proceed with surgery today.    Otto Tomas MD

## 2023-10-03 ENCOUNTER — OFFICE VISIT (OUTPATIENT)
Dept: ORTHOPEDIC SURGERY | Facility: CLINIC | Age: 38
End: 2023-10-03
Payer: COMMERCIAL

## 2023-10-03 VITALS — HEIGHT: 69 IN | BODY MASS INDEX: 26.66 KG/M2 | WEIGHT: 180 LBS | TEMPERATURE: 97.8 F

## 2023-10-03 DIAGNOSIS — S82.892D CLOSED FRACTURE OF LEFT ANKLE WITH ROUTINE HEALING, SUBSEQUENT ENCOUNTER: Primary | ICD-10-CM

## 2023-10-03 DIAGNOSIS — R52 PAIN: ICD-10-CM

## 2023-10-03 PROCEDURE — 99024 POSTOP FOLLOW-UP VISIT: CPT | Performed by: ORTHOPAEDIC SURGERY

## 2023-10-03 NOTE — PROGRESS NOTES
Mane Gunderson : 1985 MRN: [id number] DATE: 10/3/2023    CC:  2 weeks s/p ORIF left ankle fracture    HPI: Patient returns to clinic today stating pain is improved.  No fevers, chills, sweats, difficulty breathing, chest pain or shortness of breath.  Denies any new concerns or issues.  Reports compliance with splint and weight bearing restrictions.    There were no vitals filed for this visit.     Exam:  Splint in place and subsequently removed.  Wounds appear well-approximated.  No drainage or erythema.  Calf soft.  Negative Meeta's sign.  Good motor and sensory function distally in foot.  Palpable pulses with good cap refill.      Imaging:  3 view x-rays of left ankle including AP, lateral and mortise views are ordered and reviewed by me to evaluate alignment and for comparison purposes. Hardware appears in good position.  Alignment is well maintained.  No concerning findings.    Impression:  2 weeks s/p ORIF left ankle fracture    Plan:    1.  Sutures removed and replaced with steri-strips.  Cast placed.  Patients have any rubbing or feels the cast is too loose or too tight is instructed to return to the office.  2.  Continue strict NWB of left lower extremity.  3.  F/u in 2 weeks with repeat 3v xrays.  Plan for repeat cast in 2 weeks significantly nonweightbearing for 6 weeks total.    Otto Tomas MD      Procedure note    Short leg nonweightbearing cast was placed.  One 4 inch and 2 to 3 inch rolls of fiberglass material was used.

## 2023-10-10 ENCOUNTER — OFFICE VISIT (OUTPATIENT)
Dept: ORTHOPEDIC SURGERY | Facility: CLINIC | Age: 38
End: 2023-10-10
Payer: COMMERCIAL

## 2023-10-10 VITALS — TEMPERATURE: 98.2 F | HEIGHT: 69 IN | WEIGHT: 180.3 LBS | BODY MASS INDEX: 26.7 KG/M2

## 2023-10-10 DIAGNOSIS — S82.892D CLOSED FRACTURE OF LEFT ANKLE WITH ROUTINE HEALING, SUBSEQUENT ENCOUNTER: Primary | ICD-10-CM

## 2023-10-10 PROCEDURE — 99024 POSTOP FOLLOW-UP VISIT: CPT | Performed by: ORTHOPAEDIC SURGERY

## 2023-10-10 NOTE — PROGRESS NOTES
"Ankle Follow Up      Patient: Mane Gunderson    YOB: 1985 38 y.o. male    Chief Complaints: Ankle pain    History of Present Illness:  HPI patient presents 4 weeks status post fixation of left ankle fracture.  States he made a window in his cast so he could put lotion on.  I told him that is not recommended as opening of the cast changes the structural integrity of it.  He is understanding of this.  Reports pain and swelling improved    ROS: ankle pain  Past Medical History:   Diagnosis Date    ADD (attention deficit disorder)     Alcohol abuse     RECENT REHAB    Alcoholic hepatitis     Anesthesia complication     HIGH TOLERANCE TO MEDICATIONS, AWAKES EASILY, HARD TO SEDATE, WAKES UP CONFUSED OFTEN    Ankle fracture     Anxiety and depression     Dyslexia     Enlarged liver     Enlarged pancreas     Genetic disorder     UNKNOWN BUT PATIENT STATES DISORDER CAUSES MEDICATIONS TO NOT WORK EASILY AND CAUSES ANXIETY AND SIGNS OF PSTD    History of seizure     RELATED TO LIVER HEPATITIS    IBS (irritable bowel syndrome)     Pancreatitis, alcoholic, acute     DEPENDING ON ALCOHOL USE    Panic disorder     Posttraumatic stress disorder     S/P alcohol detoxification     Toxic liver disease with acute hepatitis        Physical Exam:   Vitals:    10/10/23 1116   Temp: 98.2 øF (36.8 øC)   TempSrc: Temporal   Weight: 81.8 kg (180 lb 4.8 oz)   Height: 175.3 cm (69\")   PainSc:   2   PainLoc: Ankle     Well developed with normal mood.    Left lower extremity examined incisions are healing well.  Range of motion as expected.  Wiggles his toes.  Motor and sensory intact distally.  Compartment soft compressible.    Radiology:  3 views left ankle AP lateral oblique taken reviewed show status post fixation of ankle fracture with implant satisfactory position, alignment unchanged.  Some interval healing callus formation.  The fibula fracture had some comminution but overall alignment does remain satisfactory with some " likely evidence of callus formation.  No other significant changes or previous films    Assessment/Plan:    4 weeks status post open reduction internal fixation left trimalleolar ankle fracture    Patient is progressing well.  I did instruct him to leave his cast alone.  If it is feeling too tight, too loose or any rubbing he instructed to let me know we can have him come in and changed out.  Continue vitamin D, Tylenol for pain, ice and elevation.  Follow-up in 2 weeks at which point if he is doing well may put him in a boot start progressive weightbearing.  All questions answered.  Patient understands and agrees.      Procedure note: Short leg nonweightbearing cast placed used three 3 inch fiberglass material.

## 2023-10-24 ENCOUNTER — OFFICE VISIT (OUTPATIENT)
Dept: ORTHOPEDIC SURGERY | Facility: CLINIC | Age: 38
End: 2023-10-24
Payer: COMMERCIAL

## 2023-10-24 VITALS — BODY MASS INDEX: 26.7 KG/M2 | HEIGHT: 69 IN | WEIGHT: 180.3 LBS | TEMPERATURE: 98.7 F

## 2023-10-24 DIAGNOSIS — S82.892D CLOSED FRACTURE OF LEFT ANKLE WITH ROUTINE HEALING, SUBSEQUENT ENCOUNTER: Primary | ICD-10-CM

## 2023-10-24 PROCEDURE — 99024 POSTOP FOLLOW-UP VISIT: CPT | Performed by: ORTHOPAEDIC SURGERY

## 2023-10-24 NOTE — PATIENT INSTRUCTIONS
Patient is progressing well.  We will remove the short leg cast today and placed him in a tall walking boot.  We will begin some physical therapy and some weightbearing.  During this first week he is 25% protected weightbearing with the boot on and walking aid he may increase the weightbearing by 25% each additional week as long as he is not having any pain or issues.  Once he is under percent weightbearing he may then wean off of the crutches.  We will also start some physical therapy for gentle ankle range of motion.  Patient will follow-up in 4 weeks.  Boot will be on anytime he is up.  May remove the boot to shower and sleep here in about a week as well as 3 times daily for skin checks and for ice and elevation.  If there is any changes during interim he will let me know.  Continue to ice and elevate for pain and swelling.  Tylenol for pain.  Vitamin D for bone healing and health.  All questions answered.  He understands and agrees.

## 2023-10-24 NOTE — PROGRESS NOTES
"Ankle Follow Up      Patient: Mane Gunderson    YOB: 1985 38 y.o. male    Chief Complaints: Ankle pain    History of Present Illness:  HPI patient presents 6 weeks status post open reduction internal fixation left ankle fracture.  Reports doing well.  No complaints.    ROS: ankle pain  Past Medical History:   Diagnosis Date    ADD (attention deficit disorder)     Alcohol abuse     RECENT REHAB    Alcoholic hepatitis     Anesthesia complication     HIGH TOLERANCE TO MEDICATIONS, AWAKES EASILY, HARD TO SEDATE, WAKES UP CONFUSED OFTEN    Ankle fracture     Anxiety and depression     Dyslexia     Enlarged liver     Enlarged pancreas     Genetic disorder     UNKNOWN BUT PATIENT STATES DISORDER CAUSES MEDICATIONS TO NOT WORK EASILY AND CAUSES ANXIETY AND SIGNS OF PSTD    History of seizure     RELATED TO LIVER HEPATITIS    IBS (irritable bowel syndrome)     Pancreatitis, alcoholic, acute     DEPENDING ON ALCOHOL USE    Panic disorder     Posttraumatic stress disorder     S/P alcohol detoxification     Toxic liver disease with acute hepatitis        Physical Exam:   Vitals:    10/24/23 1034   Temp: 98.7 °F (37.1 °C)   TempSrc: Temporal   Weight: 81.8 kg (180 lb 4.8 oz)   Height: 175.3 cm (69\")   PainSc:   7   PainLoc: Ankle     Well developed with normal mood.    Left lower extremity examined incisions are healing well.  Minimal swelling.  Motor and sensory intact distally.  Range of motion as expected.  Compartment soft and compressible.    Radiology:  3 views left ankle AP lateral oblique taken reviewed demonstrate interval healing with callus formation of the fractures with satisfactory alignment.  No evidence of loosening or subsidence of implants.  No other significant changes from previous films    Assessment/Plan:    6 weeks status post open reduction internal fixation left ankle fracture      Patient is progressing well.  We will remove the short leg cast today and placed him in a tall walking boot. "  We will begin some physical therapy and some weightbearing.  During this first week he is 25% protected weightbearing with the boot on and walking aid he may increase the weightbearing by 25% each additional week as long as he is not having any pain or issues.  Once he is under percent weightbearing he may then wean off of the crutches.  We will also start some physical therapy for gentle ankle range of motion.  Patient will follow-up in 4 weeks.  Boot will be on anytime he is up.  May remove the boot to shower and sleep here in about a week as well as 3 times daily for skin checks and for ice and elevation.  If there is any changes during interim he will let me know.  Continue to ice and elevate for pain and swelling.  Tylenol for pain.  Vitamin D for bone healing and health.  All questions answered.  He understands and agrees.

## 2023-10-31 ENCOUNTER — TREATMENT (OUTPATIENT)
Age: 38
End: 2023-10-31
Payer: COMMERCIAL

## 2023-10-31 DIAGNOSIS — Z47.89 ENCOUNTER FOR OTHER ORTHOPEDIC AFTERCARE: ICD-10-CM

## 2023-10-31 DIAGNOSIS — R26.89 IMPAIRED GAIT AND MOBILITY: ICD-10-CM

## 2023-10-31 DIAGNOSIS — S82.892D CLOSED FRACTURE OF LEFT ANKLE WITH ROUTINE HEALING, SUBSEQUENT ENCOUNTER: Primary | ICD-10-CM

## 2023-10-31 DIAGNOSIS — M25.671 DECREASED RANGE OF MOTION OF RIGHT ANKLE: ICD-10-CM

## 2023-10-31 NOTE — PROGRESS NOTES
Physical Therapy Initial Evaluation and Plan of Care  [facility] [place]   [address] [place] [address]  P: [phone]       F: [phone]       Patient: Mane Gunderson   : 1985  Visit Diagnoses:     ICD-10-CM ICD-9-CM   1. Closed fracture of left ankle with routine healing, subsequent encounter  S82.892D V54.19   2. Encounter for other orthopedic aftercare  Z47.89 V54.89   3. Impaired gait and mobility  R26.89 781.2   4. Decreased range of motion of right ankle  M25.671 719.57     Referring practitioner: Otto Tomas MD  Date of Initial Visit: 10/31/2023  Today's Date: 10/31/2023  Patient seen for 1 sessions           Subjective Questionnaire: LEFS:       Subjective Evaluation    History of Present Illness  Date of onset: 9/10/2023  Date of surgery: 2023  Mechanism of injury: Patient reports recent surgery due to fall and left ankle fracture.  Has increased pain as the day progresses.    Currently using wheelchair.  Has crutches to use as needed.    PMH: Autism spectrum, dyslexia,   Copied from EMR:    Gastrointestinal Abdominal    Appendicitis    Ruptured appendicitis    Alcohol-induced acute pancreatitis    Hemorrhoids    Hematemesis with nausea    Alcoholic hepatitis, unspecified whether ascites present      Genitourinary and Reproductive    Hypokalemia    Hypomagnesemia      Mental Health    Anxiety    Attention deficit hyperactivity disorder (ADHD)    Bipolar I disorder    PTSD (post-traumatic stress disorder)    Alcohol withdrawal syndrome with perceptual disturbance      Musculoskeletal and Injuries    Chronic back pain      Neuro    Migraine headache      Sleep    Insomnia      Subjective comment: Patient reports left ankle soreness.  Precautions and Work Restrictions: Patient is 6 weeks status post open reduction internal fixation left ankle fracture.   We will begin some physical therapy and some weightbearing.  During this first  week he is 25% protected weightbearing with the boot on and walking aid he may increase the weightbearing by 25% each additional week as long as he is not having any pain or issues.  Once he is under percent weightbearing he may then wean off of the crutches.  We will also start some physical therapy for gentle ankle range of motion.Pain  Current pain ratin  At worst pain ratin  Location: Left anklem heel and down to toes.  Quality: sharp (dull pain in mornings, sharp shooting in evenings)  Relieving factors: ice  Aggravating factors: movement, standing, ambulation and squatting    Social Support  Lives in: apartment (elevator access)  Lives with: alone    Patient Goals  Patient goals for therapy: decreased pain, increased motion, increased strength and improved balance  Patient goal: Return to walking.         Objective          Observations   Left Ankle/Foot   Positive for adhesive scar, atrophy, edema and incision. Negative for drainage.       Active Range of Motion   Left Ankle/Foot   Dorsiflexion (ke): 3 degrees   Plantar flexion: 32 degrees   Inversion: 30 degrees   Eversion: 10 degrees     Right Ankle/Foot   Dorsiflexion (ke): 10 degrees   Plantar flexion: 60 degrees   Inversion: 40 degrees   Eversion: 10 degrees     Strength/Myotome Testing     Additional Strength Details  Deferred due to post-op status    Swelling   Left Ankle/Foot   Metatarsal heads: 25 cm  Figure 8: 55 cm  Malleoli: 27 cm    Right Ankle/Foot   Metatarsal heads: 24 cm  Figure 8: 53 cm  Malleoli: 25 cm    Ambulation     Comments   Unable to assess due to patient not bringing his boot.          Assessment & Plan       Assessment  Impairments: abnormal gait, abnormal or restricted ROM, activity intolerance, impaired physical strength, lacks appropriate home exercise program, pain with function and weight-bearing intolerance   Functional limitations: walking and standing (Post op limitations on WB, squatting, stairs  )  Assessment  details: Mane Gunderson is a pleasant 38 y.o. male that presents with signs and symptoms consistent with the above diagnosis. He has post-op swelling and restricted scar mobility, decreased left ankle ROM, decreased left LE strength, impaired gait and functional mobility.  Pt will benefit from skilled PT services in order to address listed impairments, decrease pain and restore function.    Prognosis: good  Prognosis details: Patient demonstrates good rehab potential as evidenced by high motivation to participate with PT POC and to return to PLOF/ADLs/IADLs/Work tasks.    Goals  Plan Goals: Short Term Goals (4 wks):  1.  Patient will have increased left ankle DF to 10 degrees.  2.  Patient will have increased left ankle PF to 50 degrees.  3.  Patient will have decreased left ankle swelling by 1 cm or more.  4.  Patient will be able to tolerate FWB in boot without use of AD.    Long Term Goals (8 wks):  1.  Patient will have increased ankle strength to 5/5.  2.  Patient will have improved LEFS score of 40/80 or higher.  3.  Patient will be independent in performance of HEP.  4.  Patient will have normalized gait.      Plan  Therapy options: will be seen for skilled therapy services  Planned modality interventions: cryotherapy and thermotherapy (hydrocollator packs)  Planned therapy interventions: manual therapy, soft tissue mobilization, strengthening, stretching, joint mobilization, flexibility, functional ROM exercises, gait training, home exercise program, neuromuscular re-education, therapeutic activities and balance/weight-bearing training  Frequency: 2x week  Duration in weeks: 8  Treatment plan discussed with: patient  Plan details: Pt was educated on the importance of their HEP and their current need for continued skilled physical therapy. Patients goals and potential limitations were discussed and pt is in agreement with current plan of care and treatment emphasis.            History # of Personal Factors  and/or Comorbidities: HIGH (3+)  Examination of Body System(s): # of elements: MODERATE (3)  Clinical Presentation: STABLE   Clinical Decision Making: MODERATE      Timed:         Manual Therapy:    8     mins  67679;     Therapeutic Exercise:    10     mins  78332;     Neuromuscular Luzma:        mins  27194;    Therapeutic Activity:     15     mins  06738;     Gait Training:           mins  02762;     Ultrasound:          mins  66676;    Ionto                                  mins  63155  Self Care                            mins  24534  Canalith Repos         mins  35095  Orthotic MGMT/Train         mins  30355    Un-Timed:  Electrical Stimulation:         mins  98367 ( );  Dry Needling:          mins  39606 self-pay;  Dry Needling:          mins  65157 self-pay  Traction          mins  06030  Low Eval          mins  88672  Mod Eval     25     mins  38097  High Eval                            mins  38546    Timed Treatment:   33   mins   Total Treatment:     60   mins      PT SIGNATURE: Michelle England PT     License Number: [id number]  Electronically signed by Michelle England PT, 10/31/23, 4:38 PM EDT      DATE TREATMENT INITIATED: 10/31/2023    Initial Certification  Certification Period: 10/31/2023 thru 1/28/2024  I certify that the therapy services are furnished while this patient is under my care.  The services outlined above are required by this patient, and will be reviewed every 90 days.     PHYSICIAN: Otto Tomas MD      NPI: [id number]  DATE:         Please sign and return via fax to [phone]. Thank you, [facility].

## 2023-11-08 ENCOUNTER — TREATMENT (OUTPATIENT)
Age: 38
End: 2023-11-08
Payer: COMMERCIAL

## 2023-11-08 DIAGNOSIS — M25.671 DECREASED RANGE OF MOTION OF RIGHT ANKLE: ICD-10-CM

## 2023-11-08 DIAGNOSIS — S82.892D CLOSED FRACTURE OF LEFT ANKLE WITH ROUTINE HEALING, SUBSEQUENT ENCOUNTER: Primary | ICD-10-CM

## 2023-11-08 DIAGNOSIS — R26.89 IMPAIRED GAIT AND MOBILITY: ICD-10-CM

## 2023-11-08 DIAGNOSIS — Z47.89 ENCOUNTER FOR OTHER ORTHOPEDIC AFTERCARE: ICD-10-CM

## 2023-11-08 NOTE — PROGRESS NOTES
Physical Therapy Daily Treatment Note    [facility]  [address]  [place] [address]     Patient: Mane Gunderson   : 1985  Referring practitioner: Otto Tomas MD  Date of Initial Visit: Type: THERAPY  Noted: 10/31/2023  Today's Date: 2023  Patient seen for 2 sessions         Mane Gunderson reports: has been doing exercises and walking at home to get left ankle more mobile and to bear weight.        Subjective     Objective   -presents to clinic with tennis shoes on bilateral LE and ambulating with assistive device.  Brought walking boot but admits to not using.     See Exercise, Manual, and Modality Logs for complete treatment.   Exercise rationale/ pain free exercise performance  Anatomy and structure of affected musculature  MD protocol and WB progression with boot wear - extensive discussion regarding MD weightbearing progression and boot wear.  Discussed with mother as well.    Sleeping positions with pillows  Alternate exercise positions  Verbal/Tactile cues to ensure correct exercise performance/technique    Added: toe curls, Nu Step      Assessment/Plan  Subjectively reports lessened left ankle discomfort and noted improved motion and capability for walking.  Able to progress exercise/activity per HEP without increased symptoms/discomfort and good response to manual interventions.  Benefits from extensive discussion and education regarding MD protocol and progression of weight bearing and its importance.         Progress per Plan of Care toward all goals; per MD protocol           Timed:  Manual Therapy:   10      mins  21792;  Therapeutic Exercise:   20      mins  22844;     Neuromuscular Luzma:        mins  62810;    Therapeutic Activity:    10      mins  97153;     Gait Training:           mins  89957;     Ultrasound:          mins  98395;    Self Care                     20   mins 48252    Untimed:  Electrical Stimulation:         mins  25935  ( );  Mechanical Traction:         mins  13672;     Timed Treatment:  60    mins   Total Treatment:     60   mins  Mateo More PTA  Physical Therapist  Assistant  [id number]

## 2023-11-15 ENCOUNTER — TREATMENT (OUTPATIENT)
Age: 38
End: 2023-11-15
Payer: COMMERCIAL

## 2023-11-15 DIAGNOSIS — R26.89 IMPAIRED GAIT AND MOBILITY: ICD-10-CM

## 2023-11-15 DIAGNOSIS — Z47.89 ENCOUNTER FOR OTHER ORTHOPEDIC AFTERCARE: ICD-10-CM

## 2023-11-15 DIAGNOSIS — S82.892D CLOSED FRACTURE OF LEFT ANKLE WITH ROUTINE HEALING, SUBSEQUENT ENCOUNTER: Primary | ICD-10-CM

## 2023-11-15 DIAGNOSIS — M25.671 DECREASED RANGE OF MOTION OF RIGHT ANKLE: ICD-10-CM

## 2023-11-15 NOTE — PROGRESS NOTES
Physical Therapy Daily Treatment Note    [facility] PT - [facility]  [address]  [place] [address]     Patient: Mane Gunderson   : 1985  Referring practitioner: Otto Tomas MD  Date of Initial Visit: Type: THERAPY  Noted: 10/31/2023  Today's Date: 11/15/2023  Patient seen for 3 sessions         Mane Gunderson reports: his left ankle is continuing to improve in regards to motion and weightbearing(~85% Unbooted as reported by pt).  Swelling is still present but can be dependent upon foot positioning in regards to whether it will be increased or not.  Still uses w/c at work but uses LE's to propel self.         Subjective     Objective   -presents to clinic ambulating with bilateral crutches and tennis shoes on both feet.  Pt brought walking boot with him but not wearing.      See Exercise, Manual, and Modality Logs for complete treatment.   Added;   seated BAPS Lv 2(red) L LE df/pf and inv/ev x 15 each and 15 cw/ccw circles; brown t band bkfo and bridge    Gait training x 8 min in clinic consisting of ambulating through out clinic over floor, navigating objects/equipment, traversing thresholds. While wearing walking boot and using crutches.  Progressed ambulation in clinic to improve step/stride length as well as discussed decrease to one crutch while in walking boot and proper ambulation in that fashion,.    Verbal and tactile cues to increase foot clearance as well as to achieve longer step/stride length.  Pt responds appropriately, but needs multiple reminders/cues to sustain positive gait changes      Reviewed MD instructions from 10/24 office visit with patient to encourage boot wear when weightbearing.    We will remove the short leg cast today and placed him in a tall walking boot.  We will begin some physical therapy and some weightbearing.  During this first week he is 25% protected weightbearing with the boot on and walking aid he may increase the weightbearing by  25% each additional week as long as he is not having any pain or issues.  Once he is under percent weightbearing he may then wean off of the crutches.  We will also start some physical therapy for gentle ankle range of motion.  Patient will follow-up in 4 weeks.  Boot will be on anytime he is up.  May remove the boot to shower and sleep here in about a week as well as 3 times daily for skin checks and for ice and elevation.     Assessment/Plan  Pt continues to ambulate with increased weightbearing without walking boot despite PT reminders and against MD recommendations(reviewed above).  Able to progress ROM activities left ankle in clinic with supervision of performance with verbal/tactile cues to ensure proper maintenance of weightbearing as well as proper exercise technique with HEP.         Other  See one more time prior to MD appt on .  Check ROM, mm strength, goals, etc next visit.            Timed:  Manual Therapy:         mins  21581;  Therapeutic Exercise:   24      mins  91025;     Neuromuscular Luzma:        mins  25667;    Therapeutic Activity:    10      mins  41140;     Gait Trainin     mins  72518;     Ultrasound:          mins  62733;    Self Care                    _15__      mins 05003    Untimed:  Electrical Stimulation:         mins  30899 ( );  Mechanical Traction:         mins  03997;     Timed Treatment:   57   mins   Total Treatment:    57    mins  Mateo More PTA  Physical Therapist  Assistant  [id number]

## 2023-11-17 ENCOUNTER — TREATMENT (OUTPATIENT)
Age: 38
End: 2023-11-17
Payer: COMMERCIAL

## 2023-11-17 DIAGNOSIS — M25.671 DECREASED RANGE OF MOTION OF RIGHT ANKLE: ICD-10-CM

## 2023-11-17 DIAGNOSIS — S82.892D CLOSED FRACTURE OF LEFT ANKLE WITH ROUTINE HEALING, SUBSEQUENT ENCOUNTER: Primary | ICD-10-CM

## 2023-11-17 DIAGNOSIS — Z47.89 ENCOUNTER FOR OTHER ORTHOPEDIC AFTERCARE: ICD-10-CM

## 2023-11-17 DIAGNOSIS — R26.89 IMPAIRED GAIT AND MOBILITY: ICD-10-CM

## 2023-11-17 NOTE — LETTER
Physical Therapy Treatment Note-MD Note  [facility] [place]   [address] [place] [address]  P: [phone]       F: [phone]      Patient: Mane Gunderson   : 1985  Treatment Diagnosis:     ICD-10-CM ICD-9-CM   1. Closed fracture of left ankle with routine healing, subsequent encounter  S82.892D V54.19   2. Encounter for other orthopedic aftercare  Z47.89 V54.89   3. Impaired gait and mobility  R26.89 781.2   4. Decreased range of motion of right ankle  M25.671 719.57     Referring practitioner: Otto Tomas MD  Date of Initial Visit: Type: THERAPY  Noted: 10/31/2023  Today's Date: 2023  Patient seen for 4 sessions           Subjective   Patient reports he continues to primarily use the wheelchair because it is faster to get around. States he is able to put almost all his weight on his left foot without pain.  Reports he is not using the boot and will not use the boot.  Reports he has been walking some in the house without a shoe, using one crutch allows him to pain free.    Objective     Active Range of Motion   Left Ankle/Foot   Dorsiflexion (ke): 5 degrees   Plantar flexion: 55 degrees   Inversion: 35 degrees   Eversion: 20 degrees         Strength/Myotome Testing   Left Ankle/Foot   Dorsiflexion: 4  Plantar flexion: Not tested   Inversion: 4+   Eversion: 4+     Swelling   Left Ankle/Foot   Metatarsal heads: 24.5 cm  Figure 8: 54.5 cm  Malleoli: 27 cm     Right Ankle/Foot   Metatarsal heads: 24 cm  Figure 8: 53 cm  Malleoli: 25 cm       See Exercise, Manual, and Modality Logs for complete treatment.       Assessment/Plan  Patient arrived to PT ambulating without a boot.  He did don the boot for PT session.  He performed exercises without pain provocation.  He was educated on importance of compliance with post-op precautions and use of boot and crutches.  Encouraged him to use the boot and crutches to transition out of the wheelchair.  His  response was that he is not going to wear the boot. Requires cueing for technique and to prevent compensatory patterns.  Progress per Plan of Care        PT SIGNATURE: Michelle England PT     License Number: [id number]  Electronically signed by Michelle England, PT, 11/17/23, 3:22 PM EST

## 2023-11-17 NOTE — PROGRESS NOTES
Physical Therapy Treatment Note-MD Note  [facility] [place]   [address] [place] [address]  P: [phone]       F: [phone]      Patient: Mane Gunderson   : 1985  Treatment Diagnosis:     ICD-10-CM ICD-9-CM   1. Closed fracture of left ankle with routine healing, subsequent encounter  S82.892D V54.19   2. Encounter for other orthopedic aftercare  Z47.89 V54.89   3. Impaired gait and mobility  R26.89 781.2   4. Decreased range of motion of right ankle  M25.671 719.57     Referring practitioner: Otto Tomas MD  Date of Initial Visit: Type: THERAPY  Noted: 10/31/2023  Today's Date: 2023  Patient seen for 4 sessions           Subjective   Patient reports he continues to primarily use the wheelchair because it is faster to get around. States he is able to put almost all his weight on his left foot without pain.  Reports he is not using the boot and will not use the boot.  Reports he has been walking some in the house without a shoe, using one crutch allows him to pain free.    Objective     Active Range of Motion   Left Ankle/Foot   Dorsiflexion (ke): 5 degrees   Plantar flexion: 55 degrees   Inversion: 35 degrees   Eversion: 20 degrees         Strength/Myotome Testing   Left Ankle/Foot   Dorsiflexion: 4  Plantar flexion: Not tested   Inversion: 4+   Eversion: 4+     Swelling   Left Ankle/Foot   Metatarsal heads: 24.5 cm  Figure 8: 54.5 cm  Malleoli: 27 cm     Right Ankle/Foot   Metatarsal heads: 24 cm  Figure 8: 53 cm  Malleoli: 25 cm       See Exercise, Manual, and Modality Logs for complete treatment.       Assessment/Plan  Patient arrived to PT ambulating without a boot.  He did don the boot for PT session.  He performed exercises without pain provocation.  He was educated on importance of compliance with post-op precautions and use of boot and crutches.  Encouraged him to use the boot and crutches to transition out of the wheelchair.  His  response was that he is not going to wear the boot. Requires cueing for technique and to prevent compensatory patterns.  Progress per Plan of Care           Timed:         Manual Therapy:         mins  99499;     Therapeutic Exercise:    15     mins  15315;     Neuromuscular Luzma:    10    mins  99226;    Therapeutic Activity:     15     mins  14108;     Gait Training:           mins  18611;     Ultrasound:          mins  72702;    Ionto                                  mins  66429  Self Care                            mins  41669  Canalith Repos         mins  85567  Orthotic MGMT/Train         mins  38217    Un-Timed:  Electrical Stimulation:         mins  74110 ( );  Dry Needling:          mins  65903 self-pay;  Dry Needling:          mins  20561 self-pay  Traction          mins  26360      Timed Treatment:   40   mins   Total Treatment:     40   mins        PT SIGNATURE: Michelle England PT     License Number: [id number]  Electronically signed by Michelle England PT, 11/17/23, 3:22 PM EST

## 2023-11-21 ENCOUNTER — OFFICE VISIT (OUTPATIENT)
Dept: ORTHOPEDIC SURGERY | Facility: CLINIC | Age: 38
End: 2023-11-21
Payer: COMMERCIAL

## 2023-11-21 VITALS — BODY MASS INDEX: 26.7 KG/M2 | HEIGHT: 69 IN | TEMPERATURE: 98.1 F | WEIGHT: 180.3 LBS

## 2023-11-21 DIAGNOSIS — S82.892D CLOSED FRACTURE OF LEFT ANKLE WITH ROUTINE HEALING, SUBSEQUENT ENCOUNTER: Primary | ICD-10-CM

## 2023-11-21 PROCEDURE — 99024 POSTOP FOLLOW-UP VISIT: CPT | Performed by: ORTHOPAEDIC SURGERY

## 2023-11-21 NOTE — PROGRESS NOTES
"Ankle Follow Up      Patient: Mane Gunderson    YOB: 1985 38 y.o. male    Chief Complaints: Ankle pain    History of Present Illness:  HPI: Patient presents around 2-month status post left ankle fracture.  He comes in today just with some crutches and shoes no boot.  States he has been weightbearing some with the boot with PT.  And then at work he is rolling around in a chair with no brace on.  He is ambulated some without any boot or brace and states he is done okay.  He feels that his pain is much improved as well as range of motion.    ROS: ankle pain  Past Medical History:   Diagnosis Date    ADD (attention deficit disorder)     Alcohol abuse     RECENT REHAB    Alcoholic hepatitis     Anesthesia complication     HIGH TOLERANCE TO MEDICATIONS, AWAKES EASILY, HARD TO SEDATE, WAKES UP CONFUSED OFTEN    Ankle fracture     Anxiety and depression     Dyslexia     Enlarged liver     Enlarged pancreas     Genetic disorder     UNKNOWN BUT PATIENT STATES DISORDER CAUSES MEDICATIONS TO NOT WORK EASILY AND CAUSES ANXIETY AND SIGNS OF PSTD    History of seizure     RELATED TO LIVER HEPATITIS    IBS (irritable bowel syndrome)     Pancreatitis, alcoholic, acute     DEPENDING ON ALCOHOL USE    Panic disorder     Posttraumatic stress disorder     S/P alcohol detoxification     Toxic liver disease with acute hepatitis        Physical Exam:   Vitals:    11/21/23 1042   Temp: 98.1 °F (36.7 °C)   Weight: 81.8 kg (180 lb 4.8 oz)   Height: 175.3 cm (69\")   PainSc: 0-No pain   PainLoc: Ankle     Well developed with normal mood.      Left lower extreme exam incision well-healed.  No tenderness palpation.  Range of motion near equal to contralateral side.  Minimal swelling.  Compartment soft compressible.    Radiology:  3 views left ankle AP lateral and oblique taken and reviewed imaging shows fracture alignment overall maintained.  There is a butterfly fragment that is shown some interval healing with some early callus " formation compared to previous films.  No evidence of hardware loosening or subsidence.    Assessment/Plan:    2-month status post left ankle fracture    Discussed with the patient the importance of continuing to stick with the plan.  Like him to be in his boot for the next 2 weeks and be full weightbearing if he feels that he needs a crutch in the opposite extremity that be okay as long as he continues to do well with that like him to transition into an ASO brace inside his shoe for the following 2 weeks where he can use a crutch in the opposite extremity for that first week if needed otherwise weightbearing as tolerated with the brace on.  Continue therapy to work on ankle range of motion, strengthening and gait training.  If he develops any symptoms such as pain or feels that he is regressing he is to back off and let me know.  Otherwise we will plan to see him in 4 weeks.  Recommend vitamin D for bone healing health and did not use any tobacco vaping products.  He expressed understanding of this.

## 2023-12-05 ENCOUNTER — TELEPHONE (OUTPATIENT)
Age: 38
End: 2023-12-05

## 2023-12-05 NOTE — TELEPHONE ENCOUNTER
Caller: Dara Gunderson    Relationship: Mother       What was the call regarding: HAS A MEETING

## 2023-12-12 ENCOUNTER — TREATMENT (OUTPATIENT)
Age: 38
End: 2023-12-12
Payer: COMMERCIAL

## 2023-12-12 DIAGNOSIS — S82.892D CLOSED FRACTURE OF LEFT ANKLE WITH ROUTINE HEALING, SUBSEQUENT ENCOUNTER: Primary | ICD-10-CM

## 2023-12-12 DIAGNOSIS — R26.89 IMPAIRED GAIT AND MOBILITY: ICD-10-CM

## 2023-12-12 DIAGNOSIS — M25.671 DECREASED RANGE OF MOTION OF RIGHT ANKLE: ICD-10-CM

## 2023-12-12 DIAGNOSIS — Z47.89 ENCOUNTER FOR OTHER ORTHOPEDIC AFTERCARE: ICD-10-CM

## 2023-12-12 NOTE — PROGRESS NOTES
Physical Therapy Re-Assessment  [facility] [place]   [address] [place] [address]  P: [phone]       F: [phone]        Patient: Mane Gunderson   : 1985  Visit Diagnoses:     ICD-10-CM ICD-9-CM   1. Closed fracture of left ankle with routine healing, subsequent encounter  S82.892D V54.19   2. Encounter for other orthopedic aftercare  Z47.89 V54.89   3. Impaired gait and mobility  R26.89 781.2   4. Decreased range of motion of right ankle  M25.671 719.57     Referring practitioner: Otto Tomas MD  Date of Initial Visit: Type: THERAPY  Noted: 10/31/2023  Today's Date: 2023  Patient seen for 5 sessions      Subjective:   Mane Gunderson reports:   Subjective Questionnaire: LEFS: 66/80  Clinical Progress: improved  Home Program Compliance: Yes  Treatment has included: therapeutic exercise, neuromuscular re-education, manual therapy, therapeutic activity, and gait training    Subjective   Patient reports he has been having less pain in his ankle and been tolerating walking without ankle brace.  States he does have pain at the end of the day at work.  Reports he bruised hi toes doing stretches.    Objective          Ambulation     Observational Gait   Gait: asymmetric     Additional Observational Gait Details  Decreased push off on left heel during stance.              Active Range of Motion   Left Ankle/Foot   Dorsiflexion (ke): 10 degrees   Plantar flexion: 40 degrees   Inversion: 45 degrees   Eversion: 10 degrees      Right Ankle/Foot   Dorsiflexion (ke): 10 degrees   Plantar flexion: 60 degrees   Inversion: 40 degrees   Eversion: 10 degrees      Strength/Myotome Testing      Left Ankle/Foot   Dorsiflexion (ke): 5  Plantar flexion: 3+  Inversion: 5  Eversion: 5     Swelling   Left Ankle/Foot   Metatarsal heads: 24 cm  Figure 8: 54.5 cm  Malleoli: 26 cm     Right Ankle/Foot   Metatarsal heads: 24 cm  Figure 8: 53 cm  Malleoli: 25 cm       See  Exercise, Manual, and Modality Logs for complete treatment.     Assessment/Plan  Patient present with improved left ankle ROM and strength.  He still has some mild limitations in PF ROM and strength.  His gait has improved with mild alterations consistent with PF weakness.  Progress toward previous goals: Partially Met    Goal Review   Short Term Goals (2 wks):  1.  Patient will have increased left ankle DF to 10 degrees.-met  2.  Patient will have increased left ankle PF to 50 degrees.-progressing  3.  Patient will have decreased left ankle swelling by 1 cm or more.-met  4.  Patient will be able to tolerate FWB in boot without use of AD.-met     Long Term Goals (4 wks):  1.  Patient will have increased ankle strength to 5/5.-partially met  2.  Patient will have improved LEFS score of 40/80 or higher.-met  3.  Patient will be independent in performance of HEP.-met,ongoing  4.  Patient will have normalized gait.-progressing      Recommendations: Continue as planned  Timeframe: 1 month  Prognosis to achieve goals: good    PT SIGNATURE: Michlele England PT     License Number: [id number]  Electronically signed by Michelle England PT, 12/12/23, 4:05 PM EST        Timed:         Manual Therapy:    10     mins  57793;     Therapeutic Exercise:    10     mins  16327;     Neuromuscular Luzma:        mins  54391;    Therapeutic Activity:     15     mins  03617;     Gait Training:           mins  28453;     Ultrasound:          mins  92829;    Ionto                                  mins  67726  Self Care                            mins  01805  Canalith Repos         mins  08169  Orthotic MGMT/Train         mins  86200    Un-Timed:  Electrical Stimulation:         mins  97294 ( );  Dry Needling:          mins  33872 self-pay;  Dry Needling:          mins  19433 self-pay  Traction          mins  84992  Low Eval          mins  00488  Mod Eval          mins  30499  High Eval                            mins  90375    Timed  Treatment:   35   mins   Total Treatment:     35   mins

## 2023-12-19 ENCOUNTER — TREATMENT (OUTPATIENT)
Age: 38
End: 2023-12-19
Payer: COMMERCIAL

## 2023-12-19 DIAGNOSIS — S82.892D CLOSED FRACTURE OF LEFT ANKLE WITH ROUTINE HEALING, SUBSEQUENT ENCOUNTER: Primary | ICD-10-CM

## 2023-12-19 DIAGNOSIS — R26.89 IMPAIRED GAIT AND MOBILITY: ICD-10-CM

## 2023-12-19 DIAGNOSIS — M25.671 DECREASED RANGE OF MOTION OF RIGHT ANKLE: ICD-10-CM

## 2023-12-19 NOTE — PROGRESS NOTES
Physical Therapy Progress Note    [facility] PT - [place] [facility]  [address]  [address]  [place] [address]          2023  Otto Tomas MD    Re: Mane Gunderson  ________________________________________________________________    Mr. Mane Gunderson, has attended 2 additional PT sessions for his left ankle, since last MD follow up on 23 .  Treatment has consisted of:  therapeutic exercise, gait training and patient education    S: Mr. Mane Gunderson states: left ankle feels better since last MD follow up.  Feels that he has more mobility in his left ankle and strength is improving.  Notes that he does have increased soreness in left calf, ankle/ bottom of foot  typically after walking ~ 10,000 steps(per pedometer).  States that he is walking primarily in tennis shoes without brace support/boot wear. Feels ~85-90% improved overall.    Subjective Evaluation    Pain  Current pain ratin  At best pain ratin  At worst pain ratin (end of the day soreness calf, ankle, bottom of foot)  Quality: soreness/ache in calf.  Relieving factors: ice, rest and support  Progression: improved         Objective   AROM L ankle:  DF 10 deg  PF 47 deg today - previously measured at 60 deg  Inv 33 deg  Ev 10 deg    MMT L ankle 5/5 throughout all planes  Girth Malleoli L ankle 26.5 cm vs 25 cm previous measurement  Girth Fig 8 L ankle 55 cm vs 53 cm previous measurement\    Ambulates in/out of clinic in wearing tennis shoes and compression sock without noted brace.  Ambulates with essentially normalized gait.    See Exercise, Manual, and Modality Logs for complete treatment.   -adherence to MD recommendations regarding brace wear/ambulation  -continued ice/elevation for edema control  -gradual, slow increase of resistive, weightbearing and balance/proprioception activities.      Assessment/Plan  Limited PT attendance since last MD follow up.  Subjectively, pt reports continued improvement of his  right ankle with end of day calf/ankle soreness post walking~ 10,000 steps.  Objectively, he presents with some ROM limitations of left ankle in DF/PF planes as well as some persistent edema around malleoli.  Mm strength has improved vs last measurement.  Pt is currently independent with stretching and resistive t band activities for left ankle.  He has progressed self with weight bearing calf and balance activities on his own with apparent set back.  Educated regarding heeding of MD recommendations, continued ice application and slow/safe progression of exercise activity.    Other To MD with letter, await further orders regarding continued PT intervention           Manual Therapy:   12      mins  85708;  Therapeutic Exercise:   20      mins  16296;     Neuromuscular Luzma:        mins  37585;    Therapeutic Activity:     8     mins  72502;     Gait Training:           mins  82715;     Ultrasound:          mins  83404;    Electrical Stimulation:         mins  37958 ( );  Self Care                     10   mins 12728    Timed Treatment:   50   mins   Total Treatment:    50    mins    Mateo More PTA,   Physical Therapist Assistant # [id number]

## 2023-12-19 NOTE — LETTER
Physical Therapy Progress Note    [facility] PT - [facility]  [address]  [address]  [place] [address]          2023  Otto Tomas MD    Re: Mane Gunderson  ________________________________________________________________    Mr. Mane Gunderson, has attended 2 additional PT sessions for his left ankle, since last MD follow up on 23 .  Treatment has consisted of:  therapeutic exercise, gait training and patient education    S: Mr. Mane Gunderson states: left ankle feels better since last MD follow up.  Feels that he has more mobility in his left ankle and strength is improving.  Notes that he does have increased soreness in left calf, ankle/ bottom of foot  typically after walking ~ 10,000 steps(per pedometer).  States that he is walking primarily in tennis shoes without brace support/boot wear. Feels ~85-90% improved overall.    Subjective Evaluation    Pain  Current pain ratin  At best pain ratin  At worst pain ratin (end of the day soreness calf, ankle, bottom of foot)  Quality: soreness/ache in calf.  Relieving factors: ice, rest and support  Progression: improved         Objective   AROM L ankle:  DF 10 deg  PF 47 deg today - previously measured at 60 deg  Inv 33 deg  Ev 10 deg    MMT L ankle 5/5 throughout all planes  Girth Malleoli L ankle 26.5 cm vs 25 cm previous measurement  Girth Fig 8 L ankle 55 cm vs 53 cm previous measurement\    Ambulates in/out of clinic in wearing tennis shoes and compression sock without noted brace.  Ambulates with essentially normalized gait.    See Exercise, Manual, and Modality Logs for complete treatment.   -adherence to MD recommendations regarding brace wear/ambulation  -continued ice/elevation for edema control  -gradual, slow increase of resistive, weightbearing and balance/proprioception activities.      Assessment/Plan  Limited PT attendance since last MD follow up.  Subjectively, pt reports continued improvement of  his right ankle with end of day calf/ankle soreness post walking~ 10,000 steps.  Objectively, he presents with some ROM limitations of left ankle in DF/PF planes as well as some persistent edema around malleoli.  Mm strength has improved vs last measurement.  Pt is currently independent with stretching and resistive t band activities for left ankle.  He has progressed self with weight bearing calf and balance activities on his own with apparent set back.  Educated regarding heeding of MD recommendations, continued ice application and slow/safe progression of exercise activity.    Other To MD with letter, await further orders regarding continued PT intervention           Manual Therapy:   12      mins  21403;  Therapeutic Exercise:   20      mins  91391;     Neuromuscular Luzma:        mins  92744;    Therapeutic Activity:     8     mins  42078;     Gait Training:           mins  34947;     Ultrasound:          mins  64920;    Electrical Stimulation:         mins  15499 ( );  Self Care                     10   mins 72303    Timed Treatment:   50   mins   Total Treatment:    50    mins    Mateo More PTA,   Physical Therapist Assistant # [id number]

## 2023-12-20 ENCOUNTER — OFFICE VISIT (OUTPATIENT)
Dept: ORTHOPEDIC SURGERY | Facility: CLINIC | Age: 38
End: 2023-12-20
Payer: COMMERCIAL

## 2023-12-20 VITALS — TEMPERATURE: 97.8 F | HEIGHT: 69 IN | BODY MASS INDEX: 29.27 KG/M2 | WEIGHT: 197.6 LBS

## 2023-12-20 DIAGNOSIS — S82.892D CLOSED FRACTURE OF LEFT ANKLE WITH ROUTINE HEALING, SUBSEQUENT ENCOUNTER: Primary | ICD-10-CM

## 2023-12-20 DIAGNOSIS — R52 PAIN: ICD-10-CM

## 2023-12-20 PROCEDURE — 99024 POSTOP FOLLOW-UP VISIT: CPT | Performed by: ORTHOPAEDIC SURGERY

## 2023-12-20 NOTE — PROGRESS NOTES
Patient: Mane Gunderson  YOB: 1985 38 y.o. male  Medical Record Number: [id number]    Chief Complaints:   Chief Complaint   Patient presents with    Left Ankle - Follow-up       History of Present Illness:Mane Gunderson is a 38 y.o. male who presents for follow-up of left ankle fracture.  He is now 3 months out.  States he is [facility] PT is doing much better.  No real discomfort.  Feels he is improving and getting back to normal activities.    Allergies:   Allergies   Allergen Reactions    Ativan [Lorazepam] Mental Status Change     HIGH PANIC    Haldol [Haloperidol Lactate] Other (See Comments)     seizure    Codeine Itching     PATIENT STATES HE HAS SINCE HAD AND IT NO LONGER BOTHERS HIM       Medications:   Current Outpatient Medications   Medication Sig Dispense Refill    amphetamine-dextroamphetamine (ADDERALL) 20 MG tablet Take 1 tablet by mouth 2 (Two) Times a Day. HOLD FOR 48 HOURS PRIOR TO SURGERY      busPIRone (BUSPAR) 10 MG tablet Take 1 tablet by mouth 3 (Three) Times a Day.      cloNIDine (CATAPRES) 0.1 MG tablet Take 1 tablet by mouth 2 (Two) Times a Day. PRN      dicyclomine (BENTYL) 20 MG tablet Take 1 tablet by mouth Every 6 (Six) Hours. NO REFILLS AT THIS TIME, WORKING ON GETTING REFILLS      hydrOXYzine (ATARAX) 50 MG tablet Take 1 tablet by mouth 3 (Three) Times a Day As Needed. for anxiety      L-Methylfolate-Algae (DEPLIN 7.5 PO) Take 1 capsule by mouth Daily.      mirtazapine (REMERON) 15 MG tablet Take 1 tablet by mouth Every Night.      mirtazapine (REMERON) 30 MG tablet Take 1 tablet by mouth Daily.      naloxone (NARCAN) 4 MG/0.1ML nasal spray Call 911. Don't prime. Reading in 1 nostril for overdose. Repeat in 2-3 minutes in other nostril if no or minimal breathing/responsiveness. 2 each 0    omeprazole (priLOSEC) 20 MG capsule Take 1 capsule by mouth As Needed.      Pramoxine HCl, Perianal, 1 % foam Insert  into the rectum Every 2 (Two) Hours As Needed for Hemorrhoids.    "   propranolol (INDERAL) 20 MG tablet Take 1 tablet by mouth 3 (Three) Times a Day.      venlafaxine XR (EFFEXOR-XR) 150 MG 24 hr capsule Take 1 capsule by mouth Daily.      venlafaxine XR (EFFEXOR-XR) 75 MG 24 hr capsule Take 1 capsule by mouth Every Morning.      aspirin 81 MG EC tablet Take 1 tablet by mouth Daily. (Patient not taking: Reported on 12/20/2023) 45 tablet 0    cyclobenzaprine (FLEXERIL) 10 MG tablet Take 1 tablet by mouth 3 (Three) Times a Day As Needed for Muscle Spasms. (Patient not taking: Reported on 12/20/2023)      docusate sodium (COLACE) 100 MG capsule Take 1 capsule by mouth 2 (Two) Times a Day. (Patient not taking: Reported on 12/20/2023) 60 capsule 0    HYDROcodone-acetaminophen (NORCO) 5-325 MG per tablet Take 1-2 tablets by mouth Every 4 (Four) Hours As Needed for Moderate Pain or Severe Pain (Pain). (Patient not taking: Reported on 11/21/2023) 42 tablet 0    hyoscyamine sulfate (ANASPAZ) 0.125 MG tablet dispersible disintegrating tablet Place 1 tablet on the tongue Every 4 (Four) Hours As Needed. (Patient not taking: Reported on 12/20/2023)      ondansetron (Zofran) 4 MG tablet Take 1 tablet by mouth Every 8 (Eight) Hours As Needed for Nausea or Vomiting. (Patient not taking: Reported on 11/21/2023) 10 tablet 0     No current facility-administered medications for this visit.         The following portions of the patient's history were reviewed and updated as appropriate: allergies, current medications, past family history, past medical history, past social history, past surgical history and problem list.    Review of Systems:   A 14 point review of systems was performed. All systems negative except pertinent positives/negative listed in HPI above    Physical Exam:   Vitals:    12/20/23 1102   Temp: 97.8 °F (36.6 °C)   TempSrc: Temporal   Weight: 89.6 kg (197 lb 9.6 oz)   Height: 175.3 cm (69.02\")   PainSc:   3   PainLoc: Ankle       General: A and O x 3, ASA, NAD    SCLERA:    " Normal    DENTITION:   Normal  Left lower extremity examined: Incisions are healing well.  No tenderness palpation about the ankle.  Range of motion ankle contralateral side.    Radiology: 3 views left ankle AP lateral oblique taken reviewed imaging shows continued healing.  There appears to be improvement of the distal fibula fracture fragment compared to previous films.  Overall alignment remains satisfactory.  Implant satisfactory position.    Assessment/Plan:  3 months status post fixation left ankle fracture    Patient appears to be progressing well.  Healing of the 1 fracture fragment is been a little slow but given the nature of the injury and is only been 3 months we are still seeing some healing which is good.  He does not have any pain or symptoms.  Will continue to progress back to regular activity however no strenuous activity until he is seen back.  Will follow-up in 6 weeks.  If any issues during interim he will let us know.

## 2024-01-24 ENCOUNTER — HOSPITAL ENCOUNTER (EMERGENCY)
Facility: HOSPITAL | Age: 39
Discharge: HOME | End: 2024-01-24
Attending: EMERGENCY MEDICINE
Payer: COMMERCIAL

## 2024-01-24 ENCOUNTER — APPOINTMENT (OUTPATIENT)
Dept: CT IMAGING | Facility: HOSPITAL | Age: 39
End: 2024-01-24
Payer: COMMERCIAL

## 2024-01-24 VITALS
TEMPERATURE: 98.4 F | HEART RATE: 78 BPM | RESPIRATION RATE: 18 BRPM | DIASTOLIC BLOOD PRESSURE: 84 MMHG | SYSTOLIC BLOOD PRESSURE: 127 MMHG | OXYGEN SATURATION: 96 % | BODY MASS INDEX: 28.88 KG/M2 | WEIGHT: 195 LBS | HEIGHT: 69 IN

## 2024-01-24 DIAGNOSIS — K52.9 COLITIS: ICD-10-CM

## 2024-01-24 DIAGNOSIS — R11.0 NAUSEA: Primary | ICD-10-CM

## 2024-01-24 DIAGNOSIS — E87.1 HYPONATREMIA: ICD-10-CM

## 2024-01-24 DIAGNOSIS — R10.84 GENERALIZED ABDOMINAL PAIN: ICD-10-CM

## 2024-01-24 LAB
ALBUMIN SERPL-MCNC: 5 G/DL (ref 3.5–5.2)
ALBUMIN/GLOB SERPL: 1.9 G/DL
ALP SERPL-CCNC: 123 U/L (ref 39–117)
ALT SERPL W P-5'-P-CCNC: 32 U/L (ref 1–41)
ANION GAP SERPL CALCULATED.3IONS-SCNC: 9.2 MMOL/L (ref 5–15)
AST SERPL-CCNC: 22 U/L (ref 1–40)
BASOPHILS # BLD AUTO: 0.04 10*3/MM3 (ref 0–0.2)
BASOPHILS NFR BLD AUTO: 0.5 % (ref 0–1.5)
BILIRUB SERPL-MCNC: 0.8 MG/DL (ref 0–1.2)
BILIRUB UR QL STRIP: NEGATIVE
BUN SERPL-MCNC: 7 MG/DL (ref 6–20)
BUN/CREAT SERPL: 9.6 (ref 7–25)
CALCIUM SPEC-SCNC: 10 MG/DL (ref 8.6–10.5)
CHLORIDE SERPL-SCNC: 90 MMOL/L (ref 98–107)
CLARITY UR: CLEAR
CO2 SERPL-SCNC: 25.8 MMOL/L (ref 22–29)
COLOR UR: YELLOW
CREAT SERPL-MCNC: 0.73 MG/DL (ref 0.76–1.27)
DEPRECATED RDW RBC AUTO: 41.1 FL (ref 37–54)
EGFRCR SERPLBLD CKD-EPI 2021: 120.2 ML/MIN/1.73
EOSINOPHIL # BLD AUTO: 0.26 10*3/MM3 (ref 0–0.4)
EOSINOPHIL NFR BLD AUTO: 3.2 % (ref 0.3–6.2)
ERYTHROCYTE [DISTWIDTH] IN BLOOD BY AUTOMATED COUNT: 12.9 % (ref 12.3–15.4)
GLOBULIN UR ELPH-MCNC: 2.7 GM/DL
GLUCOSE SERPL-MCNC: 102 MG/DL (ref 65–99)
GLUCOSE UR STRIP-MCNC: NEGATIVE MG/DL
HCT VFR BLD AUTO: 39.7 % (ref 37.5–51)
HGB BLD-MCNC: 14.1 G/DL (ref 13–17.7)
HGB UR QL STRIP.AUTO: NEGATIVE
IMM GRANULOCYTES # BLD AUTO: 0.02 10*3/MM3 (ref 0–0.05)
IMM GRANULOCYTES NFR BLD AUTO: 0.2 % (ref 0–0.5)
KETONES UR QL STRIP: NEGATIVE
LEUKOCYTE ESTERASE UR QL STRIP.AUTO: NEGATIVE
LIPASE SERPL-CCNC: 7 U/L (ref 13–60)
LYMPHOCYTES # BLD AUTO: 2.64 10*3/MM3 (ref 0.7–3.1)
LYMPHOCYTES NFR BLD AUTO: 32.6 % (ref 19.6–45.3)
MCH RBC QN AUTO: 30.6 PG (ref 26.6–33)
MCHC RBC AUTO-ENTMCNC: 35.5 G/DL (ref 31.5–35.7)
MCV RBC AUTO: 86.1 FL (ref 79–97)
MONOCYTES # BLD AUTO: 0.55 10*3/MM3 (ref 0.1–0.9)
MONOCYTES NFR BLD AUTO: 6.8 % (ref 5–12)
NEUTROPHILS NFR BLD AUTO: 4.6 10*3/MM3 (ref 1.7–7)
NEUTROPHILS NFR BLD AUTO: 56.7 % (ref 42.7–76)
NITRITE UR QL STRIP: NEGATIVE
PH UR STRIP.AUTO: 7 [PH] (ref 5–8)
PLATELET # BLD AUTO: 316 10*3/MM3 (ref 140–450)
PMV BLD AUTO: 8.6 FL (ref 6–12)
POTASSIUM SERPL-SCNC: 4.4 MMOL/L (ref 3.5–5.2)
PROT SERPL-MCNC: 7.7 G/DL (ref 6–8.5)
PROT UR QL STRIP: NEGATIVE
RBC # BLD AUTO: 4.61 10*6/MM3 (ref 4.14–5.8)
SODIUM SERPL-SCNC: 125 MMOL/L (ref 136–145)
SP GR UR STRIP: <=1.005 (ref 1–1.03)
UROBILINOGEN UR QL STRIP: NORMAL
WBC NRBC COR # BLD AUTO: 8.11 10*3/MM3 (ref 3.4–10.8)

## 2024-01-24 PROCEDURE — 96375 TX/PRO/DX INJ NEW DRUG ADDON: CPT

## 2024-01-24 PROCEDURE — 99284 EMERGENCY DEPT VISIT MOD MDM: CPT

## 2024-01-24 PROCEDURE — 74177 CT ABD & PELVIS W/CONTRAST: CPT

## 2024-01-24 PROCEDURE — 25810000003 SODIUM CHLORIDE 0.9 % SOLUTION

## 2024-01-24 PROCEDURE — 25510000001 IOPAMIDOL PER 1 ML: Performed by: EMERGENCY MEDICINE

## 2024-01-24 PROCEDURE — 83690 ASSAY OF LIPASE: CPT

## 2024-01-24 PROCEDURE — 25010000002 ONDANSETRON PER 1 MG

## 2024-01-24 PROCEDURE — 81003 URINALYSIS AUTO W/O SCOPE: CPT

## 2024-01-24 PROCEDURE — 85025 COMPLETE CBC W/AUTO DIFF WBC: CPT

## 2024-01-24 PROCEDURE — 99285 EMERGENCY DEPT VISIT HI MDM: CPT

## 2024-01-24 PROCEDURE — 96361 HYDRATE IV INFUSION ADD-ON: CPT

## 2024-01-24 PROCEDURE — 80053 COMPREHEN METABOLIC PANEL: CPT

## 2024-01-24 PROCEDURE — 96374 THER/PROPH/DIAG INJ IV PUSH: CPT

## 2024-01-24 RX ORDER — FAMOTIDINE 20 MG/1
20 TABLET, FILM COATED ORAL 2 TIMES DAILY PRN
Qty: 30 TABLET | Refills: 0 | Status: SHIPPED | OUTPATIENT
Start: 2024-01-24

## 2024-01-24 RX ORDER — METHYLPREDNISOLONE 4 MG/1
TABLET ORAL
Qty: 21 TABLET | Refills: 0 | Status: SHIPPED | OUTPATIENT
Start: 2024-01-24 | End: 2024-02-01

## 2024-01-24 RX ORDER — OMEPRAZOLE 40 MG/1
40 CAPSULE, DELAYED RELEASE ORAL DAILY
Qty: 30 CAPSULE | Refills: 0 | Status: SHIPPED | OUTPATIENT
Start: 2024-01-24

## 2024-01-24 RX ORDER — ONDANSETRON 2 MG/ML
4 INJECTION INTRAMUSCULAR; INTRAVENOUS ONCE
Status: COMPLETED | OUTPATIENT
Start: 2024-01-24 | End: 2024-01-24

## 2024-01-24 RX ORDER — ONDANSETRON 4 MG/1
8 TABLET, ORALLY DISINTEGRATING ORAL EVERY 8 HOURS PRN
Qty: 12 TABLET | Refills: 0 | Status: SHIPPED | OUTPATIENT
Start: 2024-01-24

## 2024-01-24 RX ORDER — FAMOTIDINE 10 MG/ML
20 INJECTION, SOLUTION INTRAVENOUS ONCE
Status: COMPLETED | OUTPATIENT
Start: 2024-01-24 | End: 2024-01-24

## 2024-01-24 RX ORDER — ALUMINA, MAGNESIA, AND SIMETHICONE 2400; 2400; 240 MG/30ML; MG/30ML; MG/30ML
30 SUSPENSION ORAL ONCE
Status: COMPLETED | OUTPATIENT
Start: 2024-01-24 | End: 2024-01-24

## 2024-01-24 RX ORDER — PROMETHAZINE HYDROCHLORIDE 25 MG/1
25 TABLET ORAL EVERY 6 HOURS PRN
Qty: 12 TABLET | Refills: 0 | Status: SHIPPED | OUTPATIENT
Start: 2024-01-24

## 2024-01-24 RX ADMIN — SODIUM CHLORIDE 1000 ML: 9 INJECTION, SOLUTION INTRAVENOUS at 15:59

## 2024-01-24 RX ADMIN — IOPAMIDOL 85 ML: 755 INJECTION, SOLUTION INTRAVENOUS at 15:32

## 2024-01-24 RX ADMIN — ALUMINUM HYDROXIDE, MAGNESIUM HYDROXIDE, AND DIMETHICONE 30 ML: 400; 400; 40 SUSPENSION ORAL at 14:53

## 2024-01-24 RX ADMIN — FAMOTIDINE 20 MG: 10 INJECTION INTRAVENOUS at 14:53

## 2024-01-24 RX ADMIN — ONDANSETRON HYDROCHLORIDE 4 MG: 2 INJECTION, SOLUTION INTRAMUSCULAR; INTRAVENOUS at 14:53

## 2024-01-24 NOTE — FSED PROVIDER NOTE
"Subjective   History of Present Illness  Patient is a 38-year-old male who presents to the emergency department for nausea x months, worse in the past 2 weeks.  Patient thinks his symptoms onset when he began taking regular Tylenol and ibuprofen for an ankle fracture that occurred in September.  He has stopped taking them regularly in the past couple weeks.  Patient has also tried a \"food cleanse.\" Reports he has only eaten fruit over the past week, and has cut out salt.  Working with a nutritionist and dietitian.  Has PCP appointment scheduled in 2 and half weeks.  Wants to get in with GI for these issues, but felt he needed to come to the ER today as they had gotten so bad bothering him at work.  Patient reports associated diaphoresis when having nausea or defecating.  Some generalized abdominal pain.  Does not associate with food intake.  Reports mainly loose, diarrhea stools over the past couple of weeks.  Reports having bowel movements in general is difficult for him due to history of IBS and rectal inflammation from history of abuse. Remote history x 8 years ago of ruptured appendicitis, which patient states is when his generalized abdominal issues began.  History of alcohol abuse, pancreatitis, hepatitis, candida esophagitis.  Sober x 7 months.        Review of Systems   Constitutional:  Positive for appetite change, diaphoresis and fatigue. Negative for chills and fever.   HENT:  Negative for congestion.    Respiratory:  Negative for cough and shortness of breath.    Cardiovascular:  Negative for chest pain.   Gastrointestinal:  Positive for abdominal pain, diarrhea and nausea. Negative for anal bleeding, blood in stool, constipation and vomiting.   Genitourinary:  Negative for decreased urine volume, difficulty urinating, dysuria, frequency, hematuria and urgency.   Skin:  Negative for color change, pallor, rash and wound.   Neurological:  Negative for seizures and syncope.       Past Medical History: "   Diagnosis Date    ADD (attention deficit disorder)     Alcohol abuse     RECENT REHAB    Alcoholic hepatitis     Anesthesia complication     HIGH TOLERANCE TO MEDICATIONS, AWAKES EASILY, HARD TO SEDATE, WAKES UP CONFUSED OFTEN    Ankle fracture     Anxiety and depression     Dyslexia     Enlarged liver     Enlarged pancreas     Genetic disorder     UNKNOWN BUT PATIENT STATES DISORDER CAUSES MEDICATIONS TO NOT WORK EASILY AND CAUSES ANXIETY AND SIGNS OF PSTD    History of seizure     RELATED TO LIVER HEPATITIS    IBS (irritable bowel syndrome)     Pancreatitis, alcoholic, acute     DEPENDING ON ALCOHOL USE    Panic disorder     Posttraumatic stress disorder     S/P alcohol detoxification     Toxic liver disease with acute hepatitis        Allergies   Allergen Reactions    Ativan [Lorazepam] Mental Status Change     HIGH PANIC    Haldol [Haloperidol Lactate] Other (See Comments)     seizure    Codeine Itching     PATIENT STATES HE HAS SINCE HAD AND IT NO LONGER BOTHERS HIM       Past Surgical History:   Procedure Laterality Date    ANKLE OPEN REDUCTION INTERNAL FIXATION Left 9/19/2023    Procedure: left ANKLE OPEN REDUCTION INTERNAL FIXATION;  Surgeon: Otto Tomas MD;  Location: [facility];  Service: Orthopedics;  Laterality: Left;    APPENDECTOMY N/A 8/10/2016    Procedure: APPENDECTOMY LAPAROSCOPIC;  Surgeon: Bird Vazquez Jr., MD;  Location: [facility] OR;  Service:        Family History   Problem Relation Age of Onset    Obesity Other     Malig Hyperthermia Neg Hx        Social History     Socioeconomic History    Marital status: Single   Tobacco Use    Smoking status: Every Day     Types: Electronic Cigarette     Passive exposure: Current    Smokeless tobacco: Never   Vaping Use    Vaping Use: Every day    Substances: Nicotine, Flavoring, HAS HAD CBD GUMMIES IN LAST MONTH AND TRIED DELTA 9 IN VAPE    Devices: Refillable tank   Substance and Sexual Activity    Alcohol use: Not Currently     Comment:  SOBER FROM ALCOHOL SINCE 6-6-23    Drug use: No    Sexual activity: Defer           Objective   Physical Exam  Constitutional:       General: He is not in acute distress.     Appearance: He is normal weight. He is not ill-appearing, toxic-appearing or diaphoretic.   Cardiovascular:      Rate and Rhythm: Normal rate and regular rhythm.   Pulmonary:      Effort: Pulmonary effort is normal. No respiratory distress.   Abdominal:      General: Abdomen is flat. There is no distension.      Palpations: Abdomen is soft. There is no mass.      Tenderness: There is no abdominal tenderness. There is no guarding or rebound.      Hernia: No hernia is present.   Skin:     General: Skin is warm and dry.   Neurological:      Mental Status: He is alert.         Procedures           ED Course  ED Course as of 01/24/24 1851 Wed Jan 24, 2024   1644 CT abdomen pelvis with contrast:  FINDINGS:  1. The left colon is devoid of formed stool and is essentially  completely collapsed. There is a marginally thickened appearance, but  there is no surrounding inflammatory change or fluid. Can't exclude a  very mild colitis. There is no bowel ileus or obstruction.     2. There is a new 2.1 cm mixed density rounded airspace opacity at the  right lower lobe, image 6. This may represent an infectious infiltrate,  but is indeterminate and a follow-up chest CT is recommended in 6-8  weeks.     3. Urinary bladder wall is significantly thickened, but the UA is  negative. Please correlate with urinary symptoms and consider urology  follow-up.     4. There is no acute abnormality at the liver, gallbladder, pancreas,  spleen, adrenals, or kidneys.   [AS]   1815 CBC unremarkable.  CMP with hyponatremia.  1 L fluids given.  Otherwise unremarkable.  Urinalysis clear.  Low lipase.  Patient unable to provide stool culture.  Will follow-up with PCP.    Hyponatremia likely due to patient's appetite change.  Encouraged eating salt.  Patient to have lab work  redrawn in 1 week. [AS]      ED Course User Index  [AS] Taylor Feliciano, TOAN                                           Medical Decision Making  Patient is a 38-year-old male who presents for chronic nausea, diarrhea, abdominal pain.  Lab work reveals hyponatremia.  Patient given fluids.  Likely due to extreme dietary changes.  Encouraged salt.  Patient to have labs rechecked in 1 week.  Otherwise lab work is unremarkable.  CT reveals a mild colitis, which is consistent with patient's symptoms.  Bladder wall thickening has no clinical significance.  Urinalysis clear.  No urinary symptoms.  Incidental finding right lower lobe lesion.  Patient informed, and will follow-up with PCP.  Overall, no acute abnormality found.  Patient's issue appear largely chronic.  Gastritis/gastric ulcer considered likely due to patient's increased NSAID use.  No evidence of hemorrhage or blood loss.  Recommended PPIs.  Patient is also going to investigate GI follow-up.  Antinausea medications provided.  Patient also reports steroids typically help with rectal inflammation.  Will prescribe.  Discussed when to return to the emergency department.  Answered all questions.  Patient verbalized understanding and was agreeable to plan and discharge.    My differential diagnosis for abdominal pain includes but is not limited to:  Gastritis, gastroenteritis, peptic ulcer disease, GERD, esophageal perforation, acute appendicitis, mesenteric adenitis, Meckel's diverticulum, epiploic appendagitis, diverticulitis, colon cancer, ulcerative colitis, Crohn's disease, intussusception, small bowel obstruction, adhesions, ischemic bowel, perforated viscus, ileus, obstipation, biliary colic, cholecystitis, cholelithiasis, Chicho-Patrick Uriel, hepatitis, pancreatitis, common bile duct obstruction, cholangitis, bile leak, splenic trauma, splenic rupture, splenic infarction, splenic abscess, abdominal abscess, ascites, spontaneous bacterial peritonitis, hernia, UTI,  cystitis, prostatitis,ureterolithiasis, urinary obstruction, AAA, myocardial infarction, pneumonia, cancer, porphyria, DKA, medications, sickle cell, viral syndrome, zoster     Problems Addressed:  Colitis: complicated acute illness or injury  Generalized abdominal pain: complicated acute illness or injury  Hyponatremia: complicated acute illness or injury  Nausea: complicated acute illness or injury    Amount and/or Complexity of Data Reviewed  Labs: ordered.  Radiology: ordered.    Risk  OTC drugs.  Prescription drug management.        Final diagnoses:   Hyponatremia   Nausea   Generalized abdominal pain   Colitis       ED Disposition  ED Disposition       ED Disposition   Discharge    Condition   Stable    Comment   --               Dominique Ontiveros, APRN  [address] [id number]  [phone]    Schedule an appointment as soon as possible for a visit            Medication List        New Prescriptions      famotidine 20 MG tablet  Commonly known as: PEPCID  Take 1 tablet by mouth 2 (Two) Times a Day As Needed for Heartburn.     methylPREDNISolone 4 MG dose pack  Commonly known as: MEDROL  6 tabs PO x1 day, 5 tabs PO x1 day, and continue decrease of 1 tablet/day.  6 days total treatment.     ondansetron ODT 4 MG disintegrating tablet  Commonly known as: ZOFRAN-ODT  Place 2 tablets on the tongue Every 8 (Eight) Hours As Needed for Nausea or Vomiting.     promethazine 25 MG tablet  Commonly known as: PHENERGAN  Take 1 tablet by mouth Every 6 (Six) Hours As Needed for Nausea or Vomiting.            Changed      * omeprazole 20 MG capsule  Commonly known as: priLOSEC  What changed: Another medication with the same name was added. Make sure you understand how and when to take each.     * omeprazole 40 MG capsule  Commonly known as: priLOSEC  Take 1 capsule by mouth Daily.  What changed: You were already taking a medication with the same name, and this prescription was added. Make sure you  understand how and when to take each.           * This list has 2 medication(s) that are the same as other medications prescribed for you. Read the directions carefully, and ask your doctor or other care provider to review them with you.                   Where to Get Your Medications        These medications were sent to [id number] - [place], [place] - [address] AT [facility] - [phone]  - [phone]   [address], [facility] [address]      Phone: [phone]   famotidine 20 MG tablet  methylPREDNISolone 4 MG dose pack  omeprazole 40 MG capsule  ondansetron ODT 4 MG disintegrating tablet  promethazine 25 MG tablet

## 2024-01-24 NOTE — DISCHARGE INSTRUCTIONS
Follow-up with primary care for recheck sodium levels within 1 week, and for further evaluation and management of nausea and other symptoms.  May need to follow-up with GI as well.  Incidentally, there is also a small nodule seen on your right lower lung.  Discussed with PCP follow-up CT in 1 to 2 months.  Use the Zofran and/or Phenergan as needed for nausea.  Phenergan may cause drowsiness.  Do not drive while taking.    Use Imodium as indicated for diarrhea.    Start taking omeprazole daily.  Increase electrolyte fluids and small bland meals.  Increase salt.  Return to emergency department for worsening symptoms or other medical emergencies. Refer to the attached instructions for further information.

## 2024-02-01 ENCOUNTER — OFFICE VISIT (OUTPATIENT)
Dept: ORTHOPEDIC SURGERY | Facility: CLINIC | Age: 39
End: 2024-02-01
Payer: COMMERCIAL

## 2024-02-01 VITALS — WEIGHT: 181.3 LBS | TEMPERATURE: 97.8 F | HEIGHT: 69 IN | BODY MASS INDEX: 26.85 KG/M2

## 2024-02-01 DIAGNOSIS — S82.892D CLOSED FRACTURE OF LEFT ANKLE WITH ROUTINE HEALING, SUBSEQUENT ENCOUNTER: Primary | ICD-10-CM

## 2024-02-01 DIAGNOSIS — R52 PAIN: ICD-10-CM

## 2024-02-01 RX ORDER — PROPRANOLOL HYDROCHLORIDE 20 MG/1
20 TABLET ORAL EVERY 24 HOURS
COMMUNITY

## 2024-02-01 RX ORDER — CLONIDINE HYDROCHLORIDE 0.1 MG/1
TABLET ORAL EVERY 24 HOURS
COMMUNITY

## 2024-02-01 RX ORDER — BUSPIRONE HYDROCHLORIDE 10 MG/1
10 TABLET ORAL EVERY 12 HOURS SCHEDULED
COMMUNITY

## 2024-02-01 RX ORDER — DEXTROAMPHETAMINE SACCHARATE, AMPHETAMINE ASPARTATE, DEXTROAMPHETAMINE SULFATE AND AMPHETAMINE SULFATE 5; 5; 5; 5 MG/1; MG/1; MG/1; MG/1
20 TABLET ORAL EVERY 12 HOURS SCHEDULED
COMMUNITY

## 2024-02-01 NOTE — PROGRESS NOTES
Patient: Mane Gunderson  YOB: 1985 38 y.o. male  Medical Record Number: [id number]    Chief Complaints:   Chief Complaint   Patient presents with    Left Ankle - Follow-up       History of Present Illness:Mane Gunderson is a 38 y.o. male who presents for follow-up of left ankle fracture.  Patient is now over 4 months out.  States he is not having any pain.  His motion is improved.  He started to progress into regular activities.  No complaints.    Allergies:   Allergies   Allergen Reactions    Ativan [Lorazepam] Mental Status Change     HIGH PANIC    Haldol [Haloperidol Lactate] Other (See Comments)     seizure    Codeine Itching     PATIENT STATES HE HAS SINCE HAD AND IT NO LONGER BOTHERS HIM       Medications:   Current Outpatient Medications   Medication Sig Dispense Refill    amphetamine-dextroamphetamine (Adderall) 20 MG tablet Take 1 tablet by mouth Every 12 (Twelve) Hours.      busPIRone (BUSPAR) 10 MG tablet Take 1 tablet by mouth Every 12 (Twelve) Hours.      cloNIDine (CATAPRES) 0.1 MG tablet Daily.      dicyclomine (BENTYL) 20 MG tablet Take 1 tablet by mouth Every 6 (Six) Hours. NO REFILLS AT THIS TIME, WORKING ON GETTING REFILLS      famotidine (PEPCID) 20 MG tablet Take 1 tablet by mouth 2 (Two) Times a Day As Needed for Heartburn. 30 tablet 0    hydrOXYzine (ATARAX) 50 MG tablet Take 1 tablet by mouth 3 (Three) Times a Day As Needed. for anxiety      L-Methylfolate-Algae (DEPLIN 7.5 PO) Take 1 capsule by mouth Daily.      mirtazapine (REMERON) 15 MG tablet Take 1 tablet by mouth Every Night.      mirtazapine (REMERON) 30 MG tablet Take 1 tablet by mouth Daily.      naloxone (NARCAN) 4 MG/0.1ML nasal spray Call 911. Don't prime. [place] in 1 nostril for overdose. Repeat in 2-3 minutes in other nostril if no or minimal breathing/responsiveness. 2 each 0    omeprazole (priLOSEC) 20 MG capsule Take 1 capsule by mouth As Needed.      omeprazole (priLOSEC) 40 MG capsule Take 1 capsule by  mouth Daily. 30 capsule 0    ondansetron ODT (ZOFRAN-ODT) 4 MG disintegrating tablet Place 2 tablets on the tongue Every 8 (Eight) Hours As Needed for Nausea or Vomiting. 12 tablet 0    Pramoxine HCl, Perianal, 1 % foam Insert  into the rectum Every 2 (Two) Hours As Needed for Hemorrhoids.      promethazine (PHENERGAN) 25 MG tablet Take 1 tablet by mouth Every 6 (Six) Hours As Needed for Nausea or Vomiting. 12 tablet 0    propranolol (INDERAL) 20 MG tablet Take 1 tablet by mouth 3 (Three) Times a Day.      propranolol (INDERAL) 20 MG tablet 1 tablet Daily.      venlafaxine XR (EFFEXOR-XR) 150 MG 24 hr capsule Take 1 capsule by mouth Daily.      venlafaxine XR (EFFEXOR-XR) 75 MG 24 hr capsule Take 1 capsule by mouth Every Morning.      aspirin 81 MG EC tablet Take 1 tablet by mouth Daily. (Patient not taking: Reported on 12/20/2023) 45 tablet 0    cyclobenzaprine (FLEXERIL) 10 MG tablet Take 1 tablet by mouth 3 (Three) Times a Day As Needed for Muscle Spasms. (Patient not taking: Reported on 12/20/2023)      docusate sodium (COLACE) 100 MG capsule Take 1 capsule by mouth 2 (Two) Times a Day. (Patient not taking: Reported on 12/20/2023) 60 capsule 0    HYDROcodone-acetaminophen (NORCO) 5-325 MG per tablet Take 1-2 tablets by mouth Every 4 (Four) Hours As Needed for Moderate Pain or Severe Pain (Pain). (Patient not taking: Reported on 11/21/2023) 42 tablet 0    hyoscyamine sulfate (ANASPAZ) 0.125 MG tablet dispersible disintegrating tablet Place 1 tablet on the tongue Every 4 (Four) Hours As Needed. (Patient not taking: Reported on 12/20/2023)       No current facility-administered medications for this visit.         The following portions of the patient's history were reviewed and updated as appropriate: allergies, current medications, past family history, past medical history, past social history, past surgical history and problem list.    Review of Systems:   A 14 point review of systems was performed. All systems  "negative except pertinent positives/negative listed in HPI above    Physical Exam:   Vitals:    02/01/24 1347   Temp: 97.8 °F (36.6 °C)   TempSrc: Temporal   Weight: 82.2 kg (181 lb 4.8 oz)   Height: 175.3 cm (69.02\")   PainSc: 0-No pain   PainLoc: Ankle       General: A and O x 3, ASA, NAD    SCLERA:    Normal    DENTITION:   Normal  Left lower extremity examined incision healed.  Gentle motion well-tolerated near equal to contralateral side.  No tenderness palpation.  No weightbearing pain.    Radiology: 3 views left ankle AP lateral and oblique taken and reviewed images demonstrate fixation of left ankle fracture alignment satisfactory.  Evidence of healing.  On the oblique view there is still noted lucency area but does show improved fill without any change of alignment compared to previous films.    Assessment/Plan:  4.5 month status post fixation left ankle fracture    Patient is doing well he is not complaining of any pain and has been progressing his activity.  Given the x-ray findings I told him we could get a CT for definitive healing but he would like to not undergo any scans at this time.  I told the patient he is not having any symptoms to suggest that there is not healing even though we see a little bit of lucency on the x-rays noted above.  Told him to progress back into activities as tolerated if he notices any discomfort or change in symptoms to back off and let me know at that point we may consider CT scan if warranted otherwise patient will follow-up as needed.  All questions answered he understands and agrees with the plan.    "

## 2024-02-21 ENCOUNTER — APPOINTMENT (OUTPATIENT)
Dept: CT IMAGING | Facility: HOSPITAL | Age: 39
End: 2024-02-21
Payer: COMMERCIAL

## 2024-02-21 ENCOUNTER — HOSPITAL ENCOUNTER (EMERGENCY)
Facility: HOSPITAL | Age: 39
Discharge: HOME | End: 2024-02-21
Attending: EMERGENCY MEDICINE | Admitting: EMERGENCY MEDICINE
Payer: COMMERCIAL

## 2024-02-21 VITALS
OXYGEN SATURATION: 95 % | TEMPERATURE: 98.3 F | RESPIRATION RATE: 17 BRPM | BODY MASS INDEX: 26.66 KG/M2 | SYSTOLIC BLOOD PRESSURE: 139 MMHG | HEART RATE: 89 BPM | WEIGHT: 180 LBS | HEIGHT: 69 IN | DIASTOLIC BLOOD PRESSURE: 92 MMHG

## 2024-02-21 DIAGNOSIS — R10.9 CHRONIC ABDOMINAL PAIN: Primary | ICD-10-CM

## 2024-02-21 DIAGNOSIS — R11.0 NAUSEA: ICD-10-CM

## 2024-02-21 DIAGNOSIS — G89.29 CHRONIC ABDOMINAL PAIN: Primary | ICD-10-CM

## 2024-02-21 LAB
ALBUMIN SERPL-MCNC: 4.8 G/DL (ref 3.5–5.2)
ALBUMIN/GLOB SERPL: 1.9 G/DL
ALP SERPL-CCNC: 123 U/L (ref 39–117)
ALT SERPL W P-5'-P-CCNC: 25 U/L (ref 1–41)
ANION GAP SERPL CALCULATED.3IONS-SCNC: 10.3 MMOL/L (ref 5–15)
AST SERPL-CCNC: 19 U/L (ref 1–40)
BASOPHILS # BLD AUTO: 0.05 10*3/MM3 (ref 0–0.2)
BASOPHILS NFR BLD AUTO: 0.8 % (ref 0–1.5)
BILIRUB SERPL-MCNC: 0.6 MG/DL (ref 0–1.2)
BILIRUB UR QL STRIP: NEGATIVE
BUN SERPL-MCNC: 10 MG/DL (ref 6–20)
BUN/CREAT SERPL: 9.7 (ref 7–25)
CALCIUM SPEC-SCNC: 10.1 MG/DL (ref 8.6–10.5)
CHLORIDE SERPL-SCNC: 96 MMOL/L (ref 98–107)
CLARITY UR: CLEAR
CO2 SERPL-SCNC: 26.7 MMOL/L (ref 22–29)
COLOR UR: YELLOW
CREAT SERPL-MCNC: 1.03 MG/DL (ref 0.76–1.27)
DEPRECATED RDW RBC AUTO: 41 FL (ref 37–54)
EGFRCR SERPLBLD CKD-EPI 2021: 95.9 ML/MIN/1.73
EOSINOPHIL # BLD AUTO: 0.15 10*3/MM3 (ref 0–0.4)
EOSINOPHIL NFR BLD AUTO: 2.5 % (ref 0.3–6.2)
ERYTHROCYTE [DISTWIDTH] IN BLOOD BY AUTOMATED COUNT: 12.6 % (ref 12.3–15.4)
GLOBULIN UR ELPH-MCNC: 2.5 GM/DL
GLUCOSE SERPL-MCNC: 115 MG/DL (ref 65–99)
GLUCOSE UR STRIP-MCNC: NEGATIVE MG/DL
HCT VFR BLD AUTO: 40.8 % (ref 37.5–51)
HGB BLD-MCNC: 13.9 G/DL (ref 13–17.7)
HGB UR QL STRIP.AUTO: NEGATIVE
IMM GRANULOCYTES # BLD AUTO: 0.01 10*3/MM3 (ref 0–0.05)
IMM GRANULOCYTES NFR BLD AUTO: 0.2 % (ref 0–0.5)
KETONES UR QL STRIP: NEGATIVE
LEUKOCYTE ESTERASE UR QL STRIP.AUTO: NEGATIVE
LIPASE SERPL-CCNC: 9 U/L (ref 13–60)
LYMPHOCYTES # BLD AUTO: 1.59 10*3/MM3 (ref 0.7–3.1)
LYMPHOCYTES NFR BLD AUTO: 26.5 % (ref 19.6–45.3)
MCH RBC QN AUTO: 30.3 PG (ref 26.6–33)
MCHC RBC AUTO-ENTMCNC: 34.1 G/DL (ref 31.5–35.7)
MCV RBC AUTO: 88.9 FL (ref 79–97)
MONOCYTES # BLD AUTO: 0.46 10*3/MM3 (ref 0.1–0.9)
MONOCYTES NFR BLD AUTO: 7.7 % (ref 5–12)
NEUTROPHILS NFR BLD AUTO: 3.75 10*3/MM3 (ref 1.7–7)
NEUTROPHILS NFR BLD AUTO: 62.3 % (ref 42.7–76)
NITRITE UR QL STRIP: NEGATIVE
PH UR STRIP.AUTO: 6 [PH] (ref 5–8)
PLATELET # BLD AUTO: 273 10*3/MM3 (ref 140–450)
PMV BLD AUTO: 9 FL (ref 6–12)
POTASSIUM SERPL-SCNC: 4 MMOL/L (ref 3.5–5.2)
PROT SERPL-MCNC: 7.3 G/DL (ref 6–8.5)
PROT UR QL STRIP: NEGATIVE
RBC # BLD AUTO: 4.59 10*6/MM3 (ref 4.14–5.8)
SODIUM SERPL-SCNC: 133 MMOL/L (ref 136–145)
SP GR UR STRIP: <=1.005 (ref 1–1.03)
UROBILINOGEN UR QL STRIP: NORMAL
WBC NRBC COR # BLD AUTO: 6.01 10*3/MM3 (ref 3.4–10.8)

## 2024-02-21 PROCEDURE — 80053 COMPREHEN METABOLIC PANEL: CPT | Performed by: PHYSICIAN ASSISTANT

## 2024-02-21 PROCEDURE — 96376 TX/PRO/DX INJ SAME DRUG ADON: CPT

## 2024-02-21 PROCEDURE — 99284 EMERGENCY DEPT VISIT MOD MDM: CPT | Performed by: PHYSICIAN ASSISTANT

## 2024-02-21 PROCEDURE — 74177 CT ABD & PELVIS W/CONTRAST: CPT

## 2024-02-21 PROCEDURE — 81003 URINALYSIS AUTO W/O SCOPE: CPT | Performed by: PHYSICIAN ASSISTANT

## 2024-02-21 PROCEDURE — 99285 EMERGENCY DEPT VISIT HI MDM: CPT

## 2024-02-21 PROCEDURE — 83690 ASSAY OF LIPASE: CPT | Performed by: PHYSICIAN ASSISTANT

## 2024-02-21 PROCEDURE — 85025 COMPLETE CBC W/AUTO DIFF WBC: CPT | Performed by: PHYSICIAN ASSISTANT

## 2024-02-21 PROCEDURE — 25010000002 ONDANSETRON PER 1 MG: Performed by: PHYSICIAN ASSISTANT

## 2024-02-21 PROCEDURE — 25810000003 SODIUM CHLORIDE 0.9 % SOLUTION: Performed by: PHYSICIAN ASSISTANT

## 2024-02-21 PROCEDURE — 96374 THER/PROPH/DIAG INJ IV PUSH: CPT

## 2024-02-21 PROCEDURE — 25510000001 IOPAMIDOL PER 1 ML: Performed by: EMERGENCY MEDICINE

## 2024-02-21 RX ORDER — ONDANSETRON 2 MG/ML
4 INJECTION INTRAMUSCULAR; INTRAVENOUS ONCE
Status: COMPLETED | OUTPATIENT
Start: 2024-02-21 | End: 2024-02-21

## 2024-02-21 RX ORDER — PROMETHAZINE HYDROCHLORIDE 25 MG/1
25 TABLET ORAL EVERY 6 HOURS PRN
Qty: 12 TABLET | Refills: 0 | Status: SHIPPED | OUTPATIENT
Start: 2024-02-21 | End: 2024-02-26

## 2024-02-21 RX ORDER — LEVOMEFOLATE CALCIUM 15 MG
7.5 TABLET ORAL DAILY
COMMUNITY

## 2024-02-21 RX ADMIN — IOPAMIDOL 85 ML: 755 INJECTION, SOLUTION INTRAVENOUS at 11:10

## 2024-02-21 RX ADMIN — ONDANSETRON 4 MG: 2 INJECTION INTRAMUSCULAR; INTRAVENOUS at 10:31

## 2024-02-21 RX ADMIN — SODIUM CHLORIDE 1000 ML: 9 INJECTION, SOLUTION INTRAVENOUS at 10:30

## 2024-02-21 RX ADMIN — ONDANSETRON 4 MG: 2 INJECTION INTRAMUSCULAR; INTRAVENOUS at 11:34

## 2024-02-21 NOTE — FSED PROVIDER NOTE
Subjective   History of Present Illness    Patient, history of alcohol abuse, pancreatitis, hepatitis, reports that his last normal bowel movement was 5 days ago and has had loose stool for the past 4 days, but today the diarrhea improved and had formed stool but is a lot less than usual. In addition, he is complaining of nausea and left lower quadrant abdominal pain for 5 days.  Associated symptoms include ejective fever and chills.    Patient reports that he stopped drinking alcohol on 6/6/2023.  He has an appointment at the [facility] in 5 days due to his chronic abdominal pain.    Review of Systems   Constitutional:  Negative for activity change and appetite change.   Eyes:  Negative for pain.   Respiratory:  Negative for shortness of breath.    Gastrointestinal:  Positive for abdominal pain, constipation, diarrhea and nausea. Negative for vomiting.   Musculoskeletal:  Negative for arthralgias.   Skin:  Negative for color change.   Neurological:  Negative for dizziness.   All other systems reviewed and are negative.      Past Medical History:   Diagnosis Date    ADD (attention deficit disorder)     Alcohol abuse     RECENT REHAB    Alcoholic hepatitis     Anesthesia complication     HIGH TOLERANCE TO MEDICATIONS, AWAKES EASILY, HARD TO SEDATE, WAKES UP CONFUSED OFTEN    Ankle fracture     Anxiety and depression     Dyslexia     Enlarged liver     Enlarged pancreas     Genetic disorder     UNKNOWN BUT PATIENT STATES DISORDER CAUSES MEDICATIONS TO NOT WORK EASILY AND CAUSES ANXIETY AND SIGNS OF PSTD    History of seizure     RELATED TO LIVER HEPATITIS    IBS (irritable bowel syndrome)     Pancreatitis, alcoholic, acute     DEPENDING ON ALCOHOL USE    Panic disorder     Posttraumatic stress disorder     S/P alcohol detoxification     Toxic liver disease with acute hepatitis        Allergies   Allergen Reactions    Ativan [Lorazepam] Mental Status Change     HIGH PANIC    Haldol [Haloperidol Lactate] Other (See  Comments)     seizure    Codeine Itching     PATIENT STATES HE HAS SINCE HAD AND IT NO LONGER BOTHERS HIM       Past Surgical History:   Procedure Laterality Date    ANKLE OPEN REDUCTION INTERNAL FIXATION Left 9/19/2023    Procedure: left ANKLE OPEN REDUCTION INTERNAL FIXATION;  Surgeon: Otto Tomas MD;  Location: [facility];  Service: Orthopedics;  Laterality: Left;    APPENDECTOMY N/A 8/10/2016    Procedure: APPENDECTOMY LAPAROSCOPIC;  Surgeon: Bird Vazquez Jr., MD;  Location: [facility];  Service:        Family History   Problem Relation Age of Onset    Obesity Other     Malig Hyperthermia Neg Hx        Social History     Socioeconomic History    Marital status: Single   Tobacco Use    Smoking status: Every Day     Types: Electronic Cigarette     Passive exposure: Current    Smokeless tobacco: Never   Vaping Use    Vaping Use: Every day    Substances: Nicotine, Flavoring, HAS HAD CBD GUMMIES IN LAST MONTH AND TRIED DELTA 9 IN VAPE    Devices: Refillable tank   Substance and Sexual Activity    Alcohol use: Not Currently     Comment: SOBER FROM ALCOHOL SINCE 6-6-23    Drug use: No    Sexual activity: Defer           Objective   Physical Exam  Vitals and nursing note reviewed.   Constitutional:       Appearance: Normal appearance. He is normal weight.   HENT:      Head: Normocephalic and atraumatic.      Nose: Nose normal.      Mouth/Throat:      Mouth: Mucous membranes are moist.   Eyes:      Pupils: Pupils are equal, round, and reactive to light.   Cardiovascular:      Rate and Rhythm: Normal rate and regular rhythm.      Pulses: Normal pulses.      Heart sounds: Normal heart sounds.   Pulmonary:      Effort: Pulmonary effort is normal.      Breath sounds: Normal breath sounds.   Musculoskeletal:         General: Normal range of motion.      Cervical back: Normal range of motion.      Right lower leg: No edema.      Left lower leg: No edema.   Skin:     General: Skin is warm.   Neurological:       General: No focal deficit present.      Mental Status: He is alert.   Psychiatric:         Behavior: Behavior is cooperative.         Procedures           ED Course  ED Course as of 02/21/24 1234   Wed Feb 21, 2024   1018 Reviewed old records from 1/24/2024.  CT abdomen showed  CT abdomen pelvis with contrast:  FINDINGS:  1. The left colon is devoid of formed stool and is essentially  completely collapsed. There is a marginally thickened appearance, but  there is no surrounding inflammatory change or fluid. Can't exclude a  very mild colitis. There is no bowel ileus or obstruction.     2. There is a new 2.1 cm mixed density rounded airspace opacity at the  right lower lobe, image 6. This may represent an infectious infiltrate,  but is indeterminate and a follow-up chest CT is recommended in 6-8  weeks.     3. Urinary bladder wall is significantly thickened, but the UA is  negative. Please correlate with urinary symptoms and consider urology  follow-up.     4. There is no acute abnormality at the liver, gallbladder, pancreas,  spleen, adrenals, or kidneys.    Pt was prescribed Pepcid, antiemetics, and steroids upon discharge.   [SS]   1057 Sodium(!): 133  Sodium has improved since patient was seen here last month. [SS]   1113 Alkaline Phosphatase(!): 123  Baseline for patient [SS]   1233 Patient CT abdomen and pelvis today has significantly improved compared to the CT abdomen pelvis last month.  Sodium is now 133.  He reports that he has not had any nausea after the second round of Zofran.  I emphasized the importance of following up at his regular appointment at the [facility] this Monday.  All questions addressed at this time. [SS]      ED Course User Index  [SS] Silvia Jason PA-C                                           Medical Decision Making  Problems Addressed:  Chronic abdominal pain: complicated acute illness or injury  Nausea: complicated acute illness or injury    Amount and/or Complexity of Data  Reviewed  Labs: ordered. Decision-making details documented in ED Course.  Radiology: ordered.    Risk  Prescription drug management.        Final diagnoses:   Chronic abdominal pain   Nausea       ED Disposition  ED Disposition       ED Disposition   Discharge    Condition   Stable    Comment   --               Dominique Ontiveros, APRN  [address] [id number] [phone]               Where to Get Your Medications        These medications were sent to [id number] - [place], [place] - [address] AT [facility] [phone] [facility] [phone]   [address], [facility] [address]      Phone: [phone]   promethazine 25 MG tablet          Medication List      No changes were made to your prescriptions during this visit.

## 2024-02-21 NOTE — ED NOTES
Pt presents to facility with reports of nausea and constipation. Pt reports he was seen here last month for similar pain and concerned his colon is inflamed again, and reports he has been nauseated x2 days, and last bowel movement was x5 days ago, with diarrhea since then.     Luciana Alfredo RN

## 2024-02-21 NOTE — Clinical Note
[facility] FSED GENE  [address]  [place] [address]    Mane Gunderson was seen and treated in our emergency department on 2/21/2024.  He may return to work on 02/24/2024.         Thank you for choosing [facility].    Silvia Jason PA-C

## 2024-02-21 NOTE — Clinical Note
[facility] FS[facility] GENE  [address]  [place] [facility] [address]    Mane Gunderson was seen and treated in our emergency department on 2/21/2024.  He may return to work on 02/24/2024.         Thank you for choosing [facility].    Silvia Jason PA-C

## 2024-02-21 NOTE — DISCHARGE INSTRUCTIONS
Please take the medication as prescribed.  It is very important they follow-up at your regular scheduled appointment at the [facility] this Monday.  Light activity for the next few days.

## 2024-03-07 ENCOUNTER — APPOINTMENT (OUTPATIENT)
Dept: CT IMAGING | Facility: HOSPITAL | Age: 39
End: 2024-03-07
Payer: COMMERCIAL

## 2024-03-07 ENCOUNTER — HOSPITAL ENCOUNTER (EMERGENCY)
Facility: HOSPITAL | Age: 39
Discharge: HOME | End: 2024-03-08
Attending: EMERGENCY MEDICINE
Payer: COMMERCIAL

## 2024-03-07 DIAGNOSIS — R11.2 NAUSEA AND VOMITING, UNSPECIFIED VOMITING TYPE: ICD-10-CM

## 2024-03-07 DIAGNOSIS — K52.9 GASTROENTERITIS: Primary | ICD-10-CM

## 2024-03-07 LAB
ALBUMIN SERPL-MCNC: 4.9 G/DL (ref 3.5–5.2)
ALBUMIN/GLOB SERPL: 2 G/DL
ALP SERPL-CCNC: 113 U/L (ref 39–117)
ALT SERPL W P-5'-P-CCNC: 25 U/L (ref 1–41)
ANION GAP SERPL CALCULATED.3IONS-SCNC: 8.4 MMOL/L (ref 5–15)
AST SERPL-CCNC: 19 U/L (ref 1–40)
BASOPHILS # BLD AUTO: 0.03 10*3/MM3 (ref 0–0.2)
BASOPHILS NFR BLD AUTO: 0.5 % (ref 0–1.5)
BILIRUB SERPL-MCNC: 0.7 MG/DL (ref 0–1.2)
BILIRUB UR QL STRIP: NEGATIVE
BUN SERPL-MCNC: 10 MG/DL (ref 6–20)
BUN/CREAT SERPL: 10.9 (ref 7–25)
CALCIUM SPEC-SCNC: 10.3 MG/DL (ref 8.6–10.5)
CHLORIDE SERPL-SCNC: 94 MMOL/L (ref 98–107)
CLARITY UR: CLEAR
CO2 SERPL-SCNC: 30.6 MMOL/L (ref 22–29)
COLOR UR: YELLOW
CREAT SERPL-MCNC: 0.92 MG/DL (ref 0.76–1.27)
DEPRECATED RDW RBC AUTO: 38.3 FL (ref 37–54)
EGFRCR SERPLBLD CKD-EPI 2021: 109.9 ML/MIN/1.73
EOSINOPHIL # BLD AUTO: 0.22 10*3/MM3 (ref 0–0.4)
EOSINOPHIL NFR BLD AUTO: 3.9 % (ref 0.3–6.2)
ERYTHROCYTE [DISTWIDTH] IN BLOOD BY AUTOMATED COUNT: 11.8 % (ref 12.3–15.4)
GLOBULIN UR ELPH-MCNC: 2.4 GM/DL
GLUCOSE SERPL-MCNC: 100 MG/DL (ref 65–99)
GLUCOSE UR STRIP-MCNC: NEGATIVE MG/DL
HCT VFR BLD AUTO: 40.5 % (ref 37.5–51)
HGB BLD-MCNC: 14.1 G/DL (ref 13–17.7)
HGB UR QL STRIP.AUTO: NEGATIVE
IMM GRANULOCYTES # BLD AUTO: 0 10*3/MM3 (ref 0–0.05)
IMM GRANULOCYTES NFR BLD AUTO: 0 % (ref 0–0.5)
KETONES UR QL STRIP: NEGATIVE
LEUKOCYTE ESTERASE UR QL STRIP.AUTO: NEGATIVE
LIPASE SERPL-CCNC: 8 U/L (ref 13–60)
LYMPHOCYTES # BLD AUTO: 2.18 10*3/MM3 (ref 0.7–3.1)
LYMPHOCYTES NFR BLD AUTO: 39 % (ref 19.6–45.3)
MCH RBC QN AUTO: 30.1 PG (ref 26.6–33)
MCHC RBC AUTO-ENTMCNC: 34.8 G/DL (ref 31.5–35.7)
MCV RBC AUTO: 86.4 FL (ref 79–97)
MONOCYTES # BLD AUTO: 0.35 10*3/MM3 (ref 0.1–0.9)
MONOCYTES NFR BLD AUTO: 6.3 % (ref 5–12)
NEUTROPHILS NFR BLD AUTO: 2.81 10*3/MM3 (ref 1.7–7)
NEUTROPHILS NFR BLD AUTO: 50.3 % (ref 42.7–76)
NITRITE UR QL STRIP: NEGATIVE
PH UR STRIP.AUTO: 7 [PH] (ref 5–8)
PLATELET # BLD AUTO: 300 10*3/MM3 (ref 140–450)
PMV BLD AUTO: 9.5 FL (ref 6–12)
POTASSIUM SERPL-SCNC: 3.9 MMOL/L (ref 3.5–5.2)
PROT SERPL-MCNC: 7.3 G/DL (ref 6–8.5)
PROT UR QL STRIP: NEGATIVE
RBC # BLD AUTO: 4.69 10*6/MM3 (ref 4.14–5.8)
SODIUM SERPL-SCNC: 133 MMOL/L (ref 136–145)
SP GR UR STRIP: 1.01 (ref 1–1.03)
UROBILINOGEN UR QL STRIP: NORMAL
WBC NRBC COR # BLD AUTO: 5.59 10*3/MM3 (ref 3.4–10.8)

## 2024-03-07 PROCEDURE — 25810000003 SODIUM CHLORIDE 0.9 % SOLUTION: Performed by: EMERGENCY MEDICINE

## 2024-03-07 PROCEDURE — 99284 EMERGENCY DEPT VISIT MOD MDM: CPT | Performed by: EMERGENCY MEDICINE

## 2024-03-07 PROCEDURE — 96361 HYDRATE IV INFUSION ADD-ON: CPT

## 2024-03-07 PROCEDURE — 25010000002 HYDROMORPHONE 1 MG/ML SOLUTION: Performed by: EMERGENCY MEDICINE

## 2024-03-07 PROCEDURE — 80053 COMPREHEN METABOLIC PANEL: CPT | Performed by: EMERGENCY MEDICINE

## 2024-03-07 PROCEDURE — 83690 ASSAY OF LIPASE: CPT | Performed by: EMERGENCY MEDICINE

## 2024-03-07 PROCEDURE — 96374 THER/PROPH/DIAG INJ IV PUSH: CPT

## 2024-03-07 PROCEDURE — 25510000001 IOPAMIDOL PER 1 ML: Performed by: EMERGENCY MEDICINE

## 2024-03-07 PROCEDURE — 36415 COLL VENOUS BLD VENIPUNCTURE: CPT

## 2024-03-07 PROCEDURE — 96375 TX/PRO/DX INJ NEW DRUG ADDON: CPT

## 2024-03-07 PROCEDURE — 25010000002 METOCLOPRAMIDE PER 10 MG: Performed by: EMERGENCY MEDICINE

## 2024-03-07 PROCEDURE — 74177 CT ABD & PELVIS W/CONTRAST: CPT

## 2024-03-07 PROCEDURE — 85025 COMPLETE CBC W/AUTO DIFF WBC: CPT | Performed by: EMERGENCY MEDICINE

## 2024-03-07 PROCEDURE — 81003 URINALYSIS AUTO W/O SCOPE: CPT | Performed by: EMERGENCY MEDICINE

## 2024-03-07 PROCEDURE — 99285 EMERGENCY DEPT VISIT HI MDM: CPT

## 2024-03-07 RX ORDER — SODIUM CHLORIDE 9 MG/ML
125 INJECTION, SOLUTION INTRAVENOUS CONTINUOUS
Status: DISCONTINUED | OUTPATIENT
Start: 2024-03-07 | End: 2024-03-08 | Stop reason: HOSPADM

## 2024-03-07 RX ORDER — PANTOPRAZOLE SODIUM 40 MG/10ML
40 INJECTION, POWDER, LYOPHILIZED, FOR SOLUTION INTRAVENOUS ONCE
Status: COMPLETED | OUTPATIENT
Start: 2024-03-07 | End: 2024-03-07

## 2024-03-07 RX ORDER — METOCLOPRAMIDE HYDROCHLORIDE 5 MG/ML
10 INJECTION INTRAMUSCULAR; INTRAVENOUS ONCE
Status: COMPLETED | OUTPATIENT
Start: 2024-03-07 | End: 2024-03-07

## 2024-03-07 RX ADMIN — SODIUM CHLORIDE 1000 ML: 9 INJECTION, SOLUTION INTRAVENOUS at 21:46

## 2024-03-07 RX ADMIN — SODIUM CHLORIDE 125 ML/HR: 9 INJECTION, SOLUTION INTRAVENOUS at 21:45

## 2024-03-07 RX ADMIN — IOPAMIDOL 100 ML: 755 INJECTION, SOLUTION INTRAVENOUS at 22:06

## 2024-03-07 RX ADMIN — HYDROMORPHONE HYDROCHLORIDE 1 MG: 1 INJECTION, SOLUTION INTRAMUSCULAR; INTRAVENOUS; SUBCUTANEOUS at 23:22

## 2024-03-07 RX ADMIN — PANTOPRAZOLE SODIUM 40 MG: 40 INJECTION, POWDER, FOR SOLUTION INTRAVENOUS at 21:46

## 2024-03-07 RX ADMIN — METOCLOPRAMIDE 10 MG: 5 INJECTION, SOLUTION INTRAMUSCULAR; INTRAVENOUS at 21:46

## 2024-03-08 ENCOUNTER — NURSE TRIAGE (OUTPATIENT)
Dept: CALL CENTER | Facility: HOSPITAL | Age: 39
End: 2024-03-08
Payer: COMMERCIAL

## 2024-03-08 VITALS
WEIGHT: 180 LBS | HEIGHT: 69 IN | TEMPERATURE: 98.4 F | DIASTOLIC BLOOD PRESSURE: 80 MMHG | RESPIRATION RATE: 16 BRPM | BODY MASS INDEX: 26.66 KG/M2 | HEART RATE: 86 BPM | SYSTOLIC BLOOD PRESSURE: 121 MMHG | OXYGEN SATURATION: 95 %

## 2024-03-08 RX ORDER — ONDANSETRON 4 MG/1
4 TABLET, ORALLY DISINTEGRATING ORAL EVERY 8 HOURS PRN
Qty: 6 TABLET | Refills: 0 | Status: SHIPPED | OUTPATIENT
Start: 2024-03-08

## 2024-03-08 NOTE — FSED PROVIDER NOTE
"Subjective   History of Present Illness  39yo male pmh significant chronic abdominal pain/pancreatitis/bipolar disorder/hx ethanol abuse/appendectomy, followed by gastroenterology with recent consult at [facility] last week including MRI proctogram/pelvic manometry 02.29.2024 significant for pelvic floor dysfunction, presents ED c/o chronic constipation as well as 1d hx intractable nausea/vomiting.  Pt additionally reports that he recently self inflicted harm to his abdominal wall 4d previously by \"hitting\" his abdomen resulting in ecchymoses diffusely lower abdominal wall.    History provided by:  Patient  Abdominal Pain  Associated symptoms: constipation, nausea and vomiting    Associated symptoms: no diarrhea        Review of Systems   HENT: Negative.     Eyes: Negative.    Respiratory: Negative.     Cardiovascular: Negative.    Gastrointestinal:  Positive for abdominal pain, constipation, nausea and vomiting. Negative for blood in stool and diarrhea.   Genitourinary: Negative.    Allergic/Immunologic: Negative for immunocompromised state.   All other systems reviewed and are negative.      Past Medical History:   Diagnosis Date    ADD (attention deficit disorder)     Alcohol abuse     RECENT REHAB    Alcoholic hepatitis     Anesthesia complication     HIGH TOLERANCE TO MEDICATIONS, AWAKES EASILY, HARD TO SEDATE, WAKES UP CONFUSED OFTEN    Ankle fracture     Anxiety and depression     Dyslexia     Enlarged liver     Enlarged pancreas     Genetic disorder     UNKNOWN BUT PATIENT STATES DISORDER CAUSES MEDICATIONS TO NOT WORK EASILY AND CAUSES ANXIETY AND SIGNS OF PSTD    History of seizure     RELATED TO LIVER HEPATITIS    IBS (irritable bowel syndrome)     Pancreatitis, alcoholic, acute     DEPENDING ON ALCOHOL USE    Panic disorder     Posttraumatic stress disorder     S/P alcohol detoxification     Toxic liver disease with acute hepatitis        Allergies   Allergen Reactions    Ativan [Lorazepam] Mental " Status Change     HIGH PANIC    Haldol [Haloperidol Lactate] Other (See Comments)     seizure    Ciprofloxacin GI Bleeding    Naltrexone Other (See Comments)     Genetic Disorder       Past Surgical History:   Procedure Laterality Date    ANKLE OPEN REDUCTION INTERNAL FIXATION Left 9/19/2023    Procedure: left ANKLE OPEN REDUCTION INTERNAL FIXATION;  Surgeon: Otto Tomas MD;  Location: [facility];  Service: Orthopedics;  Laterality: Left;    APPENDECTOMY N/A 8/10/2016    Procedure: APPENDECTOMY LAPAROSCOPIC;  Surgeon: Bird Vazquez Jr., MD;  Location: [facility] OR;  Service:        Family History   Problem Relation Age of Onset    Obesity Other     Malig Hyperthermia Neg Hx        Social History     Socioeconomic History    Marital status: Single   Tobacco Use    Smoking status: Every Day     Types: Electronic Cigarette     Passive exposure: Current    Smokeless tobacco: Never   Vaping Use    Vaping status: Every Day    Substances: Nicotine, Flavoring, HAS HAD CBD GUMMIES IN LAST MONTH AND TRIED DELTA 9 IN VAPE    Devices: Refillable tank   Substance and Sexual Activity    Alcohol use: Not Currently     Comment: SOBER FROM ALCOHOL SINCE 6-6-23    Drug use: No    Sexual activity: Defer           Objective   Physical Exam  Vitals reviewed.   Constitutional:       General: He is not in acute distress.  HENT:      Head: Normocephalic and atraumatic.      Right Ear: External ear normal.      Left Ear: External ear normal.      Nose: Nose normal.      Mouth/Throat:      Mouth: Mucous membranes are moist.      Pharynx: Oropharynx is clear.   Eyes:      Conjunctiva/sclera: Conjunctivae normal.      Pupils: Pupils are equal, round, and reactive to light.   Cardiovascular:      Rate and Rhythm: Normal rate.      Pulses: Normal pulses.      Heart sounds: Normal heart sounds. No murmur heard.     No friction rub. No gallop.   Pulmonary:      Effort: Pulmonary effort is normal.      Breath sounds: Normal breath  sounds. No wheezing, rhonchi or rales.   Abdominal:      General: Abdomen is flat. Bowel sounds are normal.      Palpations: Abdomen is soft.      Tenderness:  in the left upper quadrant There is no guarding or rebound. Negative signs include Emery's sign and McBurney's sign.      Hernia: There is no hernia in the umbilical area or ventral area.       Musculoskeletal:         General: No swelling or deformity.      Cervical back: Normal range of motion and neck supple. No rigidity.      Right lower leg: No edema.      Left lower leg: No edema.   Lymphadenopathy:      Cervical: No cervical adenopathy.   Skin:     General: Skin is warm and dry.      Coloration: Skin is pale.   Neurological:      General: No focal deficit present.      Mental Status: He is alert and oriented to person, place, and time.      GCS: GCS eye subscore is 4. GCS verbal subscore is 5. GCS motor subscore is 6.         Procedures           ED Course      Labs Reviewed   COMPREHENSIVE METABOLIC PANEL - Abnormal; Notable for the following components:       Result Value    Glucose 100 (*)     Sodium 133 (*)     Chloride 94 (*)     CO2 30.6 (*)     All other components within normal limits    Narrative:     GFR Normal >60  Chronic Kidney Disease <60  Kidney Failure <15     LIPASE - Abnormal; Notable for the following components:    Lipase 8 (*)     All other components within normal limits   CBC WITH AUTO DIFFERENTIAL - Abnormal; Notable for the following components:    RDW 11.8 (*)     All other components within normal limits   URINALYSIS WITHOUT MICROSCOPIC (NO CULTURE) - Normal   CBC AND DIFFERENTIAL    Narrative:     The following orders were created for panel order CBC & Differential.  Procedure                               Abnormality         Status                     ---------                               -----------         ------                     CBC Auto Differential[[id number]]        Abnormal            Final result                  Please view results for these tests on the individual orders.     CT Abdomen Pelvis With Contrast    Result Date: 3/7/2024  Narrative: CT OF THE ABDOMEN PELVIS WITH CONTRAST  HISTORY: Abdominal trauma  COMPARISON: February 21, 2024  TECHNIQUE: Axial CT imaging was obtained through the abdomen and pelvis. IV contrast was administered.  FINDINGS: Images through the lung bases are clear. No suspicious hepatic lesions are seen. The adrenal glands and spleen are normal. Pancreas appears somewhat atrophic. Gallbladder appears normal. Kidneys enhance symmetrically. Mild bilateral pelvocaliectasis, right greater than left, is favored to be secondary to urinary distention of the urinary bladder. Prostate gland is within normal limits. The appendix is absent. There is no bowel obstruction. Stomach does appear distended and fluid-filled. There are also some patulous loops of small bowel. Appearance may reflect gastroenteritis. There is colonic diverticulosis. The left colon is again noted to be relatively collapsed, which limits assessment for wall thickening. Appearance is stable when compared to the January 24, 2024 study. No pneumatosis or free air is seen. No acute osseous abnormalities are identified. Patient does have some mild soft tissue stranding within the anterior abdominal wall. This is particularly notable within the lower abdominal wall, although no organized hematoma is seen.      Impression:  1. Stomach is distended and fluid-filled, and there are patulous loops of small bowel, suggesting gastroenteritis. 2. Distention of the urinary bladder. This could reflect some urinary retention.  Radiation dose reduction techniques were utilized, including automated exposure control and exposure modulation based on body size.   This report was finalized on 3/7/2024 11:15 PM by Dr. Lise Almanza M.D on Workstation: [id number]      MR Proctogram Dynamic and Sphincter Eval without IV Contrast    Result Date:  2/28/2024  Narrative: This result has an attachment that is not available. EXAM: MR PROCTOGRAM DYNAMIC AND SPHINCTER EVAL WITHOUT IV CONTRAST TECHNIQUE: Multiplanar, multisequence MR images of the pelvis were obtained without intravenous contrast. Rectal and vaginal contrast were administered and dynamic imaging at rest, during straining, and during defecation was obtained per the pelvic floor  protocol. HISTORY: Chronic constipation and suspected pelvic floor dysfunction. COMPARISONS: No comparisons available. FINDINGS: The pelvic floor is normally positioned at rest. Appropriate lift with squeezing. Despite multiple attempts at evacuation of rectal gel, only minimal rectal gel was evacuated. Paradoxical contractions are noted. H Line: 4 cm (N <5). M Line: 0 cm (N <2). (Anorectal junction is at the pubococcygeal line at rest) Levator ani musculature: Intact with mild symmetric thickening of the puborectalis muscle. Anterior compartment: Bladder base is 3 cm above the PCL at rest and 1.5 cm above the PCL during maximal strain. No cystocele. Middle Compartment: No peritoneocele or enterocele. Posterior Compartment: Anorectal junction is at the PCL at rest and is 2 cm below the PCL during maximal strain. Anorectal angle at rest is 86 degrees and 150 degrees at maximal strain, but there is no evacuation of rectal gel. Additionally, paradoxical contractions are noted where the anorectal angle decreases. No anterior rectocele. No intussusception. Other findings: Bladder is mildly distended. Otherwise normal appearance of the bladder. Mild degenerative changes in the lumbar spine with small disc herniation at L5/S1. Heterogeneously low signal in the peripheral zone of the prostate may reflect sequelae of prior prostatitis. No free fluid, mass, or lymphadenopathy in the pelvis.    Impression: Constellation of findings are suggestive of pelvic floor dysfunction (dyssynergia) with minimal evacuation of rectal gel despite  multiple attempts and paradoxical contractions. No cystocele or rectocele.    XR Abdomen 1 View    Result Date: 2/26/2024  Narrative: This result has an attachment that is not available. EXAM:  DX ABDOMEN 1 VIEW COMPARISON:  No comparisons available. INDICATION: Constipation, assess stool burden TECHNIQUE: 2 supine images of the abdomen and pelvis submitted.    Impression: No dilated air-filled loops of small bowel to suggest obstruction. Moderate volume of retained stool throughout the colon. No colonic distention. Surgical clips in the right lower quadrant. Lung base clear. No acute osseous abnormality.    CT Abdomen Pelvis With Contrast    Result Date: 2/21/2024  Narrative: ABDOMEN AND PELVIS CT WITH CONTRAST  HISTORY: Left lower quadrant pain. Questionable colitis was described on abdomen and pelvis CT last month. Apparently no treatment has been given in the interval.  TECHNIQUE: Abdomen and pelvis CT was performed using 85 mL Isovue-370 IV contrast and is correlated with previous contrast enhanced CT 01/24/2024 and 06/14/2023.  FINDINGS: The 2 cm diameter density seen in the posterolateral right lower lobe on 01/24/2024 has almost completely resolved. There are residual sub-6 mm hazy nodular densities in that area seen on images 3 and 5. The appearance is consistent with resolving postinflammatory or infectious abnormality. Lower mediastinal structures appear normal.  Parenchyma of the liver, pancreas, adrenals, spleen, and kidneys appears normal. The gallbladder is in situ and appears normal. There is no biliary ductal dilatation.  The stomach and the small bowel appear normal. Prior appendectomy. The colon appears normal; no evidence of colitis or other inflammatory process. There are a few diverticula along the descending colon.  The urinary bladder appears normal. No lymphadenopathy or free fluid. The abdominal aorta is normal in caliber. Degenerative disc change at L5-S1. The spine otherwise appears  normal.      Impression: Prior appendectomy. Hazy area of infiltrate in the posterolateral right lower lobe seen on CT 1 month ago has almost completely resolved. There is no evidence of colitis or other acute intra-abdominal/intrapelvic process.  Radiation dose reduction techniques were utilized, including automated exposure control and exposure modulation based on body size.   This report was finalized on 2/21/2024 1:25 PM by Dr. Jonatan Wheat M.D on Workstation: [id number]                                          Medical Decision Making  Labs/radiographic findings reviewed.  CBC/CMP/lipase/UA: unremarkable.  CT abd/pelvis: findings supportive of gastroenteritis/no evidence bowel obstruction. Pt condition improved.  AFVSS. No emesis while in ED. Stable discharge with gi followup.  Return precautions provided. Plan zofran 4mg odt prn.    Problems Addressed:  Gastroenteritis: complicated acute illness or injury  Nausea and vomiting, unspecified vomiting type: complicated acute illness or injury    Amount and/or Complexity of Data Reviewed  Labs: ordered.  Radiology: ordered.    Risk  Prescription drug management.        Final diagnoses:   Gastroenteritis   Nausea and vomiting, unspecified vomiting type       ED Disposition  ED Disposition       ED Disposition   Discharge    Condition   Good    Comment   --               Dominique Ontiveros, APRN  [address] [facility] [address]  [phone]    In 1 day      [facility]  [address]  [facility] [place] [address]  [phone]  In 1 day           Medication List        Changed      * ondansetron ODT 4 MG disintegrating tablet  Commonly known as: ZOFRAN-ODT  Place 2 tablets on the tongue Every 8 (Eight) Hours As Needed for Nausea or Vomiting.  What changed: Another medication with the same name was added. Make sure you understand how and when to take each.     * ondansetron ODT 4 MG disintegrating tablet  Commonly known as:  ZOFRAN-ODT  Place 1 tablet on the tongue Every 8 (Eight) Hours As Needed for Nausea or Vomiting.  What changed: You were already taking a medication with the same name, and this prescription was added. Make sure you understand how and when to take each.           * This list has 2 medication(s) that are the same as other medications prescribed for you. Read the directions carefully, and ask your doctor or other care provider to review them with you.                   Where to Get Your Medications        These medications were sent to [id number] - [place], [place] - [address] AT [facility] - [phone]  - [phone]   [address], [facility] [address]      Phone: [phone]   ondansetron ODT 4 MG disintegrating tablet

## 2024-03-09 NOTE — TELEPHONE ENCOUNTER
"Reason for Disposition   Patient sounds very sick or weak to the triager    Additional Information   Negative: Sounds like a life-threatening emergency to the triager   Negative: Discharged in past month from the hospital with a diagnosis of chronic obstructive pulmonic disease (COPD)   Negative: Discharged in past month from the hospital with a diagnosis of congestive heart failure (CHF) or heart failure (HF)   Negative: Discharged in past month from the hospital with a diagnosis of heart attack (myocardial infarction)   Negative: Discharged in past month from the hospital with a diagnosis of pneumonia   Negative: Taking antibiotic for cellulitis (follow-up call)   Negative: Taking antibiotic for other infection (follow-up call)   Negative: [1] Post-op AND [2] incision symptoms or questions   Negative: [1] Post-op AND [2] general symptoms or questions   Negative: Pain, redness, swelling, or pus at IV Site   Negative: IV not running or running slowly   Negative: Symptoms arising from use of a urinary catheter (e.g., Coude, Caballero)   Negative: Medication question   Negative: New-onset fever   Negative: [1] New symptom AND [2] not a possible complication of hospitalized condition    Answer Assessment - Initial Assessment Questions  1. MAIN CONCERN OR SYMPTOM:  \"What is your main concern right now?\" \"What question do you have?\" \"What's the main symptom you're worried about?\" (e.g., breathing difficulty, ankle swelling, weight gain.)      Thinks son may have serotonin syndrome, has not shown any improvement since ER visit  2. ONSET: \"When did the  this  start?\"      Was seen in ER yesterday  3. BETTER-SAME-WORSE: \"Are you getting better, staying the same, or getting worse compared to the day you were discharged?\"      same  4. HOSPITALIZATION: \"How long were you hospitalized?\" (e.g., days)      Seen in ER  5. DISCHARGE DIAGNOSIS:  \"What problem or disease were you hospitalized for?\"      Gastroenteritis   6. DISCHARGE " "DATE: \"What date were you discharged from the hospital?\"      -  7. DISCHARGE DOCTOR: \"Who is the main doctor taking care of you now?\"      -  8. DISCHARGE APPOINTMENT: \"Have you scheduled a follow-up discharge appointment with your doctor?\"      -  9. DISCHARGE MEDICINES: \"Did the doctor (or NP/PA) who discharged you order any new medicines for you to use? If yes, have you filled the prescription and started taking the medicine?\"       -  10. PAIN: \"Is there any pain?\" If Yes, ask: \"How bad is it?\"  (Scale 0-10; or mild, moderate, severe)    - NONE (0): no pain    - MILD (1-3): doesn't interfere with normal activities    - MODERATE (4-7): interferes with normal activities (e.g., work or school) or awakens from sleep    - SEVERE (8-10): excruciating pain, unable to do any normal activities        Reported having abd pain  11. FEVER: \"Do you have a fever?\" If Yes, ask: \"What is it, how was it measured  and when did it start?\"        Denied fever  12. OTHER SYMPTOMS: \"Do you have any other symptoms?\"        nausea    Protocols used: Post-Hospitalization Follow-up Call-ADULT-    "

## 2025-06-05 ENCOUNTER — OFFICE VISIT (OUTPATIENT)
Dept: ORTHOPEDIC SURGERY | Facility: CLINIC | Age: 40
End: 2025-06-05
Payer: COMMERCIAL

## 2025-06-05 VITALS — BODY MASS INDEX: 25.27 KG/M2 | TEMPERATURE: 97.5 F | WEIGHT: 170.6 LBS | HEIGHT: 69 IN

## 2025-06-05 DIAGNOSIS — M20.11 HALLUX VALGUS OF RIGHT FOOT: ICD-10-CM

## 2025-06-05 DIAGNOSIS — R52 PAIN: Primary | ICD-10-CM

## 2025-06-05 DIAGNOSIS — M20.41 HAMMER TOE OF RIGHT FOOT: ICD-10-CM

## 2025-06-05 DIAGNOSIS — M77.41 METATARSALGIA OF RIGHT FOOT: ICD-10-CM

## 2025-06-05 DIAGNOSIS — M19.071 ARTHRITIS OF FIRST METATARSOPHALANGEAL (MTP) JOINT OF RIGHT FOOT: ICD-10-CM

## 2025-06-05 PROCEDURE — 99214 OFFICE O/P EST MOD 30 MIN: CPT | Performed by: ORTHOPAEDIC SURGERY

## 2025-06-05 NOTE — PROGRESS NOTES
"   New Patient Encounter      Patient: Mane Gunderson  YOB: 1985 40 y.o. male  Medical Record Number: [id number]    Chief Complaints: Toe gets sore    History of Present Illness: Patient reports that he had an injury to his right first toe in high school where he kicked something and has had persistent intermittent pain and decreased motion pain is mainly over the medial aspect of the first MTP joint but also some dorsal laterally and had some discomfort in the second toe with \"buckling\".    Patient had previous left ankle fracture treated operatively by Dr. Tomas in September 2023 and last saw him in February 2024.        Subsequent to that he has been working on range of motion exercises doing calf raises etc. and working on range of motion of his right great toe which has improved he said by about 50% and now feels more functional.  Therapy continues working designing endoscopes.    HPI    Allergies:   Allergies   Allergen Reactions    Ativan [Lorazepam] Mental Status Change     HIGH PANIC    Haldol [Haloperidol Lactate] Other (See Comments)     seizure    Ciprofloxacin GI Bleeding    Naltrexone Other (See Comments)     Genetic Disorder       Medications:   Current Outpatient Medications on File Prior to Visit   Medication Sig    amphetamine-dextroamphetamine (Adderall) 20 MG tablet Take 1 tablet by mouth Every 12 (Twelve) Hours.    aspirin 81 MG EC tablet Take 1 tablet by mouth Daily.    busPIRone (BUSPAR) 10 MG tablet Take 1 tablet by mouth Every 12 (Twelve) Hours.    cloNIDine (CATAPRES) 0.1 MG tablet Daily.    cyclobenzaprine (FLEXERIL) 10 MG tablet Take 1 tablet by mouth 3 (Three) Times a Day As Needed for Muscle Spasms.    dicyclomine (BENTYL) 20 MG tablet Take 1 tablet by mouth Every 6 (Six) Hours. NO REFILLS AT THIS TIME, WORKING ON GETTING REFILLS    docusate sodium (COLACE) 100 MG capsule Take 1 capsule by mouth 2 (Two) Times a Day.    famotidine (PEPCID) 20 MG tablet Take 1 tablet by " mouth 2 (Two) Times a Day As Needed for Heartburn.    HYDROcodone-acetaminophen (NORCO) 5-325 MG per tablet Take 1-2 tablets by mouth Every 4 (Four) Hours As Needed for Moderate Pain or Severe Pain (Pain). (Patient not taking: Reported on 11/21/2023)    hydrOXYzine (ATARAX) 50 MG tablet Take 1 tablet by mouth 3 (Three) Times a Day As Needed. for anxiety    hyoscyamine sulfate (ANASPAZ) 0.125 MG tablet dispersible disintegrating tablet Place 1 tablet on the tongue Every 4 (Four) Hours As Needed. (Patient not taking: Reported on 12/20/2023)    l-methylfolate 15 MG tablet tablet Take 0.5 tablets by mouth Daily.    L-Methylfolate-Algae (DEPLIN 7.5 PO) Take 1 capsule by mouth Daily.    mirtazapine (REMERON) 15 MG tablet Take 1 tablet by mouth Every Night.    mirtazapine (REMERON) 30 MG tablet Take 1 tablet by mouth Daily.    naloxone (NARCAN) 4 MG/0.1ML nasal spray Call 911. Don't prime. [place] in 1 nostril for overdose. Repeat in 2-3 minutes in other nostril if no or minimal breathing/responsiveness.    omeprazole (priLOSEC) 20 MG capsule Take 1 capsule by mouth As Needed.    omeprazole (priLOSEC) 40 MG capsule Take 1 capsule by mouth Daily.    ondansetron ODT (ZOFRAN-ODT) 4 MG disintegrating tablet Place 2 tablets on the tongue Every 8 (Eight) Hours As Needed for Nausea or Vomiting.    ondansetron ODT (ZOFRAN-ODT) 4 MG disintegrating tablet Place 1 tablet on the tongue Every 8 (Eight) Hours As Needed for Nausea or Vomiting.    Pramoxine HCl, Perianal, 1 % foam Insert  into the rectum Every 2 (Two) Hours As Needed for Hemorrhoids.    propranolol (INDERAL) 20 MG tablet Take 1 tablet by mouth 3 (Three) Times a Day.    propranolol (INDERAL) 20 MG tablet 1 tablet Daily.    venlafaxine XR (EFFEXOR-XR) 150 MG 24 hr capsule Take 1 capsule by mouth Daily.    venlafaxine XR (EFFEXOR-XR) 75 MG 24 hr capsule Take 1 capsule by mouth Every Morning.     No current facility-administered medications on file prior to visit.       Past  Medical History:   Diagnosis Date    ADD (attention deficit disorder)     Alcohol abuse     RECENT REHAB    Alcoholic hepatitis     Anesthesia complication     HIGH TOLERANCE TO MEDICATIONS, AWAKES EASILY, HARD TO SEDATE, WAKES UP CONFUSED OFTEN    Ankle fracture     Anxiety and depression     Dyslexia     Enlarged liver     Enlarged pancreas     Genetic disorder     UNKNOWN BUT PATIENT STATES DISORDER CAUSES MEDICATIONS TO NOT WORK EASILY AND CAUSES ANXIETY AND SIGNS OF PSTD    History of seizure     RELATED TO LIVER HEPATITIS    IBS (irritable bowel syndrome)     Pancreatitis, alcoholic, acute     DEPENDING ON ALCOHOL USE    Panic disorder     Posttraumatic stress disorder     S/P alcohol detoxification     Toxic liver disease with acute hepatitis      Past Surgical History:   Procedure Laterality Date    ANKLE OPEN REDUCTION INTERNAL FIXATION Left 9/19/2023    Procedure: left ANKLE OPEN REDUCTION INTERNAL FIXATION;  Surgeon: Otto Tomas MD;  Location: [facility];  Service: Orthopedics;  Laterality: Left;    APPENDECTOMY N/A 8/10/2016    Procedure: APPENDECTOMY LAPAROSCOPIC;  Surgeon: Bird Vazquez Jr., MD;  Location: [facility] OR;  Service:      Social History     Occupational History    Not on file   Tobacco Use    Smoking status: Every Day     Types: Electronic Cigarette     Passive exposure: Current    Smokeless tobacco: Never   Vaping Use    Vaping status: Every Day    Substances: Nicotine, Flavoring, HAS HAD CBD GUMMIES IN LAST MONTH AND TRIED DELTA 9 IN VAPE    Devices: Refillable tank   Substance and Sexual Activity    Alcohol use: Not Currently     Comment: SOBER FROM ALCOHOL SINCE 6-6-23    Drug use: No    Sexual activity: Defer      Social History     Social History Narrative    Not on file     Family History   Problem Relation Age of Onset    Obesity Other     Malig Hyperthermia Neg Hx        Review of Systems: 14 point review of systems performed, positive pertinent findings identified in  "HPI. All remaining systems negative     Review of Systems      Physical Exam:   Vitals:    06/05/25 1317   Temp: 97.5 °F (36.4 °C)   Weight: 77.4 kg (170 lb 9.6 oz)   Height: 175.3 cm (69\")   PainSc: 6      Physical Exam   Constitutional: pleasant, well developed   Eyes: sclera non icteric  Hearing : adequate for exam  Cardiovascular: palpable pulses in right foot, right calf/ thigh NT without sign of DVT  Respiratoy: breathing unlabored   Neurological: grossly sensate to LT throughout right LE  Psychiatric: oriented with normal mood and affect.   Lymphatic: No palpable popliteal lymphadenopathy right LE  Skin: intact throughout right leg/foot  Musculoskeletal: Exam he has moderate hallux valgus that is passively correctable nearly to neutral.  He had mild tenderness over the medial aspect of the first MTP joint and some dorsal laterally.  He had 50 degrees dorsiflexion 30 degrees plantarflexion with mild pain that he described as about 2 out of 10.  There was some mild \"buckling\" of the second toe when he flexed it down with the first.  Physical Exam  Ortho Exam    Radiology: 3 views right foot ordered evaluate pain alignment reviewed no prior x-rays available for comparison these show no obvious fracture or gross malalignment.  There is moderate hallux valgus with some arthritis around the first MTP joint.  There is a bit of morphology to the metatarsal heads but no obvious fracture.  There is some plantar calcaneal prominence there is some hammering of the lesser toes.    Assessment/Plan: 1.  Right hallux valgus with first MTP arthritis  2.  Right second hammertoe    We discussed treatment going forward and given his overall improvement I would not recommend he workup further or surgical treatment could eventually require bunion correction or fusion.    We discussed treatment going forward and he was fitted with a silicone spacer to offload the medial eminence of the first MTP joint and also advised on intrinsic " stretching and strengthening exercises to do for his toes    Prescribed compounding cream for him to apply several times daily    I recommend heel cord stretching exercises to do at least 4 times a day.  Instruction sheet was provided and demonstrated for the patient. We discussed etiology of heel cord tightness to midfoot and forefoot overload.      If he has recurrent persistent symptoms he will follow-up otherwise by mutual agreement I will see him back as needed

## (undated) DEVICE — APPL CHLORAPREP HI/LITE 26ML ORNG

## (undated) DEVICE — IMPLANTABLE DEVICE
Type: IMPLANTABLE DEVICE | Site: ANKLE | Status: NON-FUNCTIONAL
Removed: 2023-09-19

## (undated) DEVICE — PCH INST SURG INVISISHIELD 2PCKT

## (undated) DEVICE — DISPOSABLE TOURNIQUET CUFF SINGLE BLADDER, SINGLE PORT AND QUICK CONNECT CONNECTOR: Brand: COLOR CUFF

## (undated) DEVICE — DRSNG GZ PETROLTM XEROFORM CURAD 1X8IN STRL

## (undated) DEVICE — Device

## (undated) DEVICE — STPLR SKIN VISISTAT WD 35CT

## (undated) DEVICE — DRP C/ARMOR

## (undated) DEVICE — SUT MNCRYL 2/0 SH 27IN Y417H

## (undated) DEVICE — GLV SURG BIOGEL LTX PF 8

## (undated) DEVICE — BNDG ELAS CO-FLEX SLF ADHR 4IN5YD LF STRL

## (undated) DEVICE — BNDG ELAS ELITE V/CLOSE 4IN 5YD LF STRL

## (undated) DEVICE — DRP SURG U/DRP INVISISHIELD PA 48X52IN

## (undated) DEVICE — PIN FIX TEMP BBTAK

## (undated) DEVICE — GLV SURG SENSICARE PI LF PF 8 GRN STRL

## (undated) DEVICE — PAD,ABDOMINAL,8"X10",ST,LF: Brand: MEDLINE

## (undated) DEVICE — ANTIBACTERIAL UNDYED BRAIDED (POLYGLACTIN 910), SYNTHETIC ABSORBABLE SUTURE: Brand: COATED VICRYL

## (undated) DEVICE — BNDG ESMARK STRL 6INX12FT LF

## (undated) DEVICE — DRAPE,U/ SHT,SPLIT,PLAS,STERIL: Brand: MEDLINE

## (undated) DEVICE — PATIENT RETURN ELECTRODE, SINGLE-USE, CONTACT QUALITY MONITORING, ADULT, WITH 9FT CORD, FOR PATIENTS WEIGING OVER 33LBS. (15KG): Brand: MEGADYNE

## (undated) DEVICE — SKIN PREP TRAY W/CHG: Brand: MEDLINE INDUSTRIES, INC.

## (undated) DEVICE — DRP C/ARM 41X74IN

## (undated) DEVICE — PK ORTHO MINOR TOWER 40

## (undated) DEVICE — TBG PENCL TELESCP MEGADYNE SMOKE EVAC 10FT

## (undated) DEVICE — BNDG PLSTR SPECIALIST FAST 4IN 5YD LF

## (undated) DEVICE — SUT ETHLN 4/0 PS2 PLSTC 1667G

## (undated) DEVICE — BANDAGE,GAUZE,BULKEE II,4.5"X4.1YD,STRL: Brand: MEDLINE

## (undated) DEVICE — BIT DRL 3.0MM

## (undated) DEVICE — BIT DRL TI 2.5MM

## (undated) DEVICE — UNDERCAST PADDING: Brand: DEROYAL

## (undated) DEVICE — GAUZE,SPONGE,FLUFF,6"X6.75",STRL,10/TRAY: Brand: MEDLINE

## (undated) DEVICE — SOL ISO/ALC 70PCT 4OZ

## (undated) DEVICE — DRAPE,REIN 53X77,STERILE: Brand: MEDLINE

## (undated) DEVICE — TRAP FLD MINIVAC MEGADYNE 100ML